# Patient Record
Sex: FEMALE | Race: WHITE | NOT HISPANIC OR LATINO | Employment: OTHER | URBAN - METROPOLITAN AREA
[De-identification: names, ages, dates, MRNs, and addresses within clinical notes are randomized per-mention and may not be internally consistent; named-entity substitution may affect disease eponyms.]

---

## 2017-07-19 ENCOUNTER — HOSPITAL ENCOUNTER (OUTPATIENT)
Dept: ORTHOPEDIC SURGERY | Facility: CLINIC | Age: 62
Discharge: HOME | End: 2017-07-19
Attending: ORTHOPAEDIC SURGERY | Admitting: ORTHOPAEDIC SURGERY

## 2017-08-02 ENCOUNTER — HOSPITAL ENCOUNTER (OUTPATIENT)
Dept: OTHER | Facility: HOSPITAL | Age: 62
Setting detail: RECURRING SERIES
Discharge: HOME | End: 2017-09-05
Attending: ORTHOPAEDIC SURGERY | Admitting: ORTHOPAEDIC SURGERY

## 2017-12-29 ENCOUNTER — HOSPITAL ENCOUNTER (OUTPATIENT)
Dept: ORTHOPEDIC SURGERY | Facility: CLINIC | Age: 62
Discharge: HOME | End: 2017-12-29
Attending: ORTHOPAEDIC SURGERY | Admitting: ORTHOPAEDIC SURGERY

## 2018-02-17 ENCOUNTER — HOSPITAL ENCOUNTER (OUTPATIENT)
Dept: MRI IMAGING | Facility: HOSPITAL | Age: 63
Discharge: HOME | End: 2018-02-17
Attending: ORTHOPAEDIC SURGERY | Admitting: ORTHOPAEDIC SURGERY

## 2018-03-19 ENCOUNTER — HOSPITAL ENCOUNTER (OUTPATIENT)
Dept: PAIN MEDICINE | Facility: HOSPITAL | Age: 63
Discharge: HOME | End: 2018-03-19
Attending: PHYSICAL MEDICINE & REHABILITATION | Admitting: PHYSICAL MEDICINE & REHABILITATION

## 2018-03-19 ENCOUNTER — HOSPITAL ENCOUNTER (OUTPATIENT)
Dept: OTHER | Facility: HOSPITAL | Age: 63
Setting detail: RECURRING SERIES
Discharge: HOME | End: 2018-05-26
Attending: ORTHOPAEDIC SURGERY | Admitting: ORTHOPAEDIC SURGERY

## 2018-04-02 ENCOUNTER — HOSPITAL ENCOUNTER (OUTPATIENT)
Dept: PAIN MEDICINE | Facility: HOSPITAL | Age: 63
Discharge: HOME | End: 2018-04-02
Attending: PHYSICAL MEDICINE & REHABILITATION | Admitting: PHYSICAL MEDICINE & REHABILITATION

## 2018-04-24 ENCOUNTER — HOSPITAL ENCOUNTER (OUTPATIENT)
Dept: PAIN MEDICINE | Facility: HOSPITAL | Age: 63
Discharge: HOME | End: 2018-04-24
Attending: PHYSICAL MEDICINE & REHABILITATION | Admitting: PHYSICAL MEDICINE & REHABILITATION

## 2018-05-08 ENCOUNTER — HOSPITAL ENCOUNTER (OUTPATIENT)
Dept: PAIN MEDICINE | Facility: HOSPITAL | Age: 63
Discharge: HOME | End: 2018-05-08
Attending: PHYSICAL MEDICINE & REHABILITATION | Admitting: PHYSICAL MEDICINE & REHABILITATION

## 2018-08-22 ENCOUNTER — HOSPITAL ENCOUNTER (OUTPATIENT)
Dept: ORTHOPEDIC SURGERY | Facility: CLINIC | Age: 63
Discharge: HOME | End: 2018-08-22
Attending: ORTHOPAEDIC SURGERY | Admitting: ORTHOPAEDIC SURGERY

## 2018-09-05 ENCOUNTER — ON CAMPUS - OUTPATIENT (AMBULATORY)
Dept: URBAN - METROPOLITAN AREA HOSPITAL 2 | Facility: HOSPITAL | Age: 63
End: 2018-09-05

## 2018-09-05 VITALS
HEART RATE: 71 BPM | SYSTOLIC BLOOD PRESSURE: 108 MMHG | SYSTOLIC BLOOD PRESSURE: 92 MMHG | SYSTOLIC BLOOD PRESSURE: 94 MMHG | DIASTOLIC BLOOD PRESSURE: 56 MMHG | OXYGEN SATURATION: 99 % | DIASTOLIC BLOOD PRESSURE: 51 MMHG | OXYGEN SATURATION: 100 % | HEART RATE: 64 BPM | HEART RATE: 68 BPM | DIASTOLIC BLOOD PRESSURE: 65 MMHG | TEMPERATURE: 97.1 F | SYSTOLIC BLOOD PRESSURE: 106 MMHG | HEART RATE: 72 BPM | RESPIRATION RATE: 14 BRPM | HEIGHT: 67 IN | RESPIRATION RATE: 16 BRPM | SYSTOLIC BLOOD PRESSURE: 103 MMHG | SYSTOLIC BLOOD PRESSURE: 100 MMHG | DIASTOLIC BLOOD PRESSURE: 58 MMHG | OXYGEN SATURATION: 96 % | WEIGHT: 182 LBS | RESPIRATION RATE: 17 BRPM | DIASTOLIC BLOOD PRESSURE: 57 MMHG | HEART RATE: 73 BPM | HEART RATE: 69 BPM

## 2018-09-05 DIAGNOSIS — Z48.815 ENCOUNTER FOR SURGICAL AFTERCARE FOLLOWING SURGERY ON THE DI: ICD-10-CM

## 2018-09-05 DIAGNOSIS — K22.2 ESOPHAGEAL OBSTRUCTION: ICD-10-CM

## 2018-09-05 DIAGNOSIS — R13.10 DYSPHAGIA, UNSPECIFIED: ICD-10-CM

## 2018-09-05 PROCEDURE — 43249 ESOPH EGD DILATION <30 MM: CPT | Performed by: INTERNAL MEDICINE

## 2018-10-31 ENCOUNTER — HOSPITAL ENCOUNTER (OUTPATIENT)
Dept: ORTHOPEDIC SURGERY | Facility: CLINIC | Age: 63
Discharge: HOME | End: 2018-10-31
Attending: ORTHOPAEDIC SURGERY | Admitting: ORTHOPAEDIC SURGERY

## 2018-11-30 ENCOUNTER — HOSPITAL ENCOUNTER (OUTPATIENT)
Dept: OTHER | Facility: HOSPITAL | Age: 63
Setting detail: RECURRING SERIES
Discharge: HOME | End: 2018-12-31
Attending: ORTHOPAEDIC SURGERY | Admitting: ORTHOPAEDIC SURGERY

## 2019-01-02 ENCOUNTER — HOSPITAL ENCOUNTER (OUTPATIENT)
Dept: OTHER | Facility: HOSPITAL | Age: 64
Setting detail: RECURRING SERIES
Discharge: HOME | End: 2019-02-05
Attending: ORTHOPAEDIC SURGERY | Admitting: ORTHOPAEDIC SURGERY

## 2019-01-08 ENCOUNTER — HOSPITAL ENCOUNTER (OUTPATIENT)
Dept: ORTHOPEDIC SURGERY | Facility: CLINIC | Age: 64
Discharge: HOME | End: 2019-01-08
Attending: ORTHOPAEDIC SURGERY | Admitting: ORTHOPAEDIC SURGERY

## 2019-01-14 ENCOUNTER — HOSPITAL ENCOUNTER (OUTPATIENT)
Dept: OTHER | Facility: HOSPITAL | Age: 64
Discharge: HOME | End: 2019-01-14
Attending: ORTHOPAEDIC SURGERY | Admitting: ORTHOPAEDIC SURGERY

## 2019-01-18 ENCOUNTER — OFFICE (AMBULATORY)
Dept: URBAN - METROPOLITAN AREA CLINIC 64 | Facility: CLINIC | Age: 64
End: 2019-01-18
Payer: COMMERCIAL

## 2019-01-18 VITALS
DIASTOLIC BLOOD PRESSURE: 74 MMHG | SYSTOLIC BLOOD PRESSURE: 111 MMHG | HEART RATE: 76 BPM | HEIGHT: 67 IN | WEIGHT: 184 LBS

## 2019-01-18 DIAGNOSIS — F45.8 OTHER SOMATOFORM DISORDERS: ICD-10-CM

## 2019-01-18 DIAGNOSIS — R13.10 DYSPHAGIA, UNSPECIFIED: ICD-10-CM

## 2019-01-18 DIAGNOSIS — Z98.84 BARIATRIC SURGERY STATUS: ICD-10-CM

## 2019-01-18 DIAGNOSIS — I82.409 ACUTE EMBOLISM AND THROMBOSIS OF UNSPECIFIED DEEP VEINS OF U: ICD-10-CM

## 2019-01-18 PROCEDURE — 99213 OFFICE O/P EST LOW 20 MIN: CPT | Performed by: NURSE PRACTITIONER

## 2019-01-18 RX ORDER — SUCRALFATE 1 G/10ML
800 SUSPENSION ORAL
Qty: 1600 | Refills: 0 | Status: COMPLETED
Start: 2019-01-18 | End: 2020-02-27

## 2019-01-18 RX ORDER — PANTOPRAZOLE SODIUM 40 MG/1
40 TABLET, DELAYED RELEASE ORAL
Qty: 90 | Refills: 4 | Status: ACTIVE

## 2019-02-21 ENCOUNTER — OFFICE (AMBULATORY)
Dept: URBAN - METROPOLITAN AREA CLINIC 64 | Facility: CLINIC | Age: 64
End: 2019-02-21
Payer: COMMERCIAL

## 2019-02-21 VITALS
WEIGHT: 182 LBS | HEART RATE: 93 BPM | HEIGHT: 67 IN | SYSTOLIC BLOOD PRESSURE: 118 MMHG | DIASTOLIC BLOOD PRESSURE: 71 MMHG

## 2019-02-21 DIAGNOSIS — R13.10 DYSPHAGIA, UNSPECIFIED: ICD-10-CM

## 2019-02-21 PROCEDURE — 99214 OFFICE O/P EST MOD 30 MIN: CPT | Performed by: INTERNAL MEDICINE

## 2019-03-29 ENCOUNTER — ON CAMPUS - OUTPATIENT (AMBULATORY)
Dept: URBAN - METROPOLITAN AREA HOSPITAL 2 | Facility: HOSPITAL | Age: 64
End: 2019-03-29
Payer: COMMERCIAL

## 2019-03-29 VITALS
HEART RATE: 74 BPM | OXYGEN SATURATION: 100 % | TEMPERATURE: 97.6 F | HEART RATE: 76 BPM | HEART RATE: 72 BPM | OXYGEN SATURATION: 96 % | OXYGEN SATURATION: 99 % | DIASTOLIC BLOOD PRESSURE: 73 MMHG | OXYGEN SATURATION: 97 % | HEIGHT: 67 IN | SYSTOLIC BLOOD PRESSURE: 120 MMHG | WEIGHT: 184 LBS | SYSTOLIC BLOOD PRESSURE: 100 MMHG | SYSTOLIC BLOOD PRESSURE: 106 MMHG | DIASTOLIC BLOOD PRESSURE: 59 MMHG | SYSTOLIC BLOOD PRESSURE: 99 MMHG | SYSTOLIC BLOOD PRESSURE: 103 MMHG | RESPIRATION RATE: 16 BRPM | HEART RATE: 68 BPM | DIASTOLIC BLOOD PRESSURE: 58 MMHG | RESPIRATION RATE: 18 BRPM | DIASTOLIC BLOOD PRESSURE: 60 MMHG | HEART RATE: 77 BPM | DIASTOLIC BLOOD PRESSURE: 65 MMHG | HEART RATE: 69 BPM

## 2019-03-29 DIAGNOSIS — Z48.815 ENCOUNTER FOR SURGICAL AFTERCARE FOLLOWING SURGERY ON THE DI: ICD-10-CM

## 2019-03-29 DIAGNOSIS — K22.2 ESOPHAGEAL OBSTRUCTION: ICD-10-CM

## 2019-03-29 DIAGNOSIS — K44.9 DIAPHRAGMATIC HERNIA WITHOUT OBSTRUCTION OR GANGRENE: ICD-10-CM

## 2019-03-29 DIAGNOSIS — R13.10 DYSPHAGIA, UNSPECIFIED: ICD-10-CM

## 2019-03-29 PROCEDURE — 43235 EGD DIAGNOSTIC BRUSH WASH: CPT | Performed by: INTERNAL MEDICINE

## 2019-03-29 PROCEDURE — 43450 DILATE ESOPHAGUS 1/MULT PASS: CPT | Performed by: INTERNAL MEDICINE

## 2019-03-29 RX ORDER — POLYETHYLENE GLYCOL 3350 17 G/17G
POWDER, FOR SOLUTION ORAL
Qty: 3 | Refills: 0 | Status: COMPLETED
Start: 2019-03-29 | End: 2020-02-27

## 2019-04-30 ENCOUNTER — HOSPITAL ENCOUNTER (OUTPATIENT)
Dept: ORTHOPEDIC SURGERY | Facility: CLINIC | Age: 64
Discharge: HOME | End: 2019-04-30
Attending: ORTHOPAEDIC SURGERY | Admitting: ORTHOPAEDIC SURGERY

## 2019-05-22 ENCOUNTER — CONVERSION ENCOUNTER (OUTPATIENT)
Dept: OTHER | Facility: HOSPITAL | Age: 64
End: 2019-05-22

## 2019-05-28 ENCOUNTER — CONVERSION ENCOUNTER (OUTPATIENT)
Dept: ORTHOPEDIC SURGERY | Facility: CLINIC | Age: 64
End: 2019-05-28

## 2019-06-04 VITALS
SYSTOLIC BLOOD PRESSURE: 106 MMHG | DIASTOLIC BLOOD PRESSURE: 67 MMHG | HEART RATE: 93 BPM | HEIGHT: 67 IN | BODY MASS INDEX: 28.41 KG/M2 | WEIGHT: 181 LBS

## 2019-06-04 VITALS — HEIGHT: 67 IN

## 2019-06-06 NOTE — PROGRESS NOTES
Visit Type:  Follow-up Visit  Referring Provider:  Adan Madsen MD  Primary Care Provider:  [facility]    Chief Complaint:  f/u EMG BUE and neck pain.    History of Present Illness:         This is a 63 years old female who presents with f/u EMG BUE, neck pain.  The intensity is described as 6.           The patient presents with central neck pain, bilateral shoulder pain and upper back pain.  The shoulder pain is also associated with numbness and tingling.  The patient describes the pain as sharp, stabbing and burning.  Pain is better with postural   change.  Significant past history includes previous surgery and prior neck problems.  Evaluation to date includes plain films and EMG.        Past Medical History:     Reviewed history from 10/31/2018 and no changes required:        Anemia        Arthritis        Depression        fibromyalgia        HX OF DVT        Hypertension        family hx of heart problems         neck pain        Osteoarthritis        DDD        Scoliosis        Chronic fatigue        Anxiety        Neuropathy        No Drug Allergies?                 Pain management--Dr. Arora TYRA C4-C7        Physical Therapy-- [facility], [place]                                            Past Surgical History:     Reviewed history from 2019 and no changes required:        hysterectomy        abdominal mass removed with ovaries removed as well        veins in both legs stripped        Gastric Bypass:         Total Hip Arthroplasty-left        Esophageal stretching 2018, 2019        ACDF C3-C7 / Daniel C5 2018  Dr. Meeks                Vital Signs:    Patient Profile:    63 Years Old Female  Height:     67 inches (170.18 cm)  Weight:     181 pounds  BMI:        28.35     Pulse rate: 93 / minute  BP Sittin / 67  (left arm)    Cuff size:  large          Vitals Entered By: Zully Sarkar CMA (May 28, 2019 12:58 PM)    Family History Summary:      Reviewed history Last on 2019 and no  changes required:05/28/2019  Brother - Has Family History of Heart Disease - Entered On: 11/2/2016  Father - Has Family History of Other Cancer - Entered On: 11/2/2016  Mother - Has Family History of Heart Disease - Entered On: 11/2/2016      Social History:     Reviewed history from 08/22/2018 and no changes required:        Patient has never smoked.        Passive Smoke: N        Alcohol Use: Y        Drug Use: N        HIV/High Risk: N        Regular Exercise: N        Hx Domestic Abuse: N        Denominational Affecting Care: N              Active Medications:  FLONASE ALLERGY RELIEF 50 MCG/ACT NASAL SUSPENSION (FLUTICASONE PROPIONATE)   ALLEGRA ALLERGY 180 MG ORAL TABLET (FEXOFENADINE HCL)   B-12 COMPLIANCE INJECTION 1000 MCG/ML INJECTION KIT (CYANOCOBALAMIN) as directed  CALCIUM 500 TABLET (CALCIUM-MAGNESIUM-VITAMIN D TABS)   VITAMIN D (ERGOCALCIFEROL) 92373 UNIT ORAL CAPSULE (ERGOCALCIFEROL) 1 po weekly  FOLIC ACID 1 MG ORAL TABLET (FOLIC ACID)   PROTONIX 20 MG ORAL TABLET DELAYED RELEASE (PANTOPRAZOLE SODIUM) 1 tablet daily  SINGULAIR 10 MG ORAL TABLET (MONTELUKAST SODIUM) p. o. qd  CLARITIN 10 MG ORAL CAPSULE (LORATADINE) 1 tablet daily  BACLOFEN 10 MG ORAL TABLET (BACLOFEN) 1 tablet twice daily  HYDROCODONE-ACETAMINOPHEN  MG ORAL TABLET (HYDROCODONE-ACETAMINOPHEN) 1 po q 8 hrs  VALIUM 2 MG ORAL TABLET (DIAZEPAM) 1 po  6 hours prn  LISINOPRIL 20 MG ORAL TABLET (LISINOPRIL)   GABAPENTIN 400 MG ORAL CAPSULE (GABAPENTIN) QID  WELLBUTRIN  MG ORAL TABLET EXTENDED RELEASE 24 HOUR (BUPROPION HCL) qd  PROMETHAZINE HCL 25 MG ORAL TABLET (PROMETHAZINE HCL) Take/use as needed.    Current Allergies:  * NKDA (Critical)      Risk Factors:     Smoked Tobacco Use:  Never smoker  Smokeless Tobacco Use:  Never  Passive smoke exposure:  no  Drug use:  no  HIV high-risk behavior:  no  Caffeine use:  2 drinks per day  Alcohol use:  yes  Exercise:  no  Seatbelt use:  75 %  Sun Exposure:  occasionally    Previous Tobacco  "Use: Signed On - 04/30/2019  Smoked Tobacco Use:  Never smoker  Smokeless Tobacco Use:  Never  Passive smoke exposure:  no  Drug use:  no  HIV high-risk behavior:  no  Caffeine use:  2 drinks per day    Previous Alcohol Use: Signed On - 04/30/2019  Alcohol use:  yes  Exercise:  no  Seatbelt use:  75 %  Sun Exposure:  occasionally      Review of Systems   Neurologic: Denies Seizure.   General: Denies anorexia.   Eyes: Denies irritation.   Ears/Nose/Throat: Denies ear discharge.   Cardiovascular: Denies PND.   Respiratory: Denies chronic cough.   Gastrointestinal: Denies odynophagia.   Musculoskeletal: Complains of back pain, muscle cramps, stiffness.   Skin: Denies rash.   Psychiatric: Denies hallucinations.   Endocrine: Denies polydipsia.   ROS Comments: Minimal axial neck pain bilateral hand discomfort and numbness,, anemia, depression, anxiety the patient is a status post anterior cervical diskectomy and fusion      EXAM:   Movement: Free  Posture good  Occiput Tenderness: None    Neck   Extension Full       Flexion 1\" from chest     Right Lateral bending 30-45  Left Lateral bending 30-45  Right Rotation 45-60  Left Rotation 45-60    Muscle tenderness: None  Hypertonicity None  Vertebral tenderness: None    Thoracic   Muscle tenderness: None  Hypertonicity None  Vertebral tenderness: None    Lumbar:   ROM: slow  Flexes 15 degrees  Flexes 8 inches from floor  Extends slowly  Paraspinous muscle tenderness: bilateral  Hypertonicity none  Vertebral tenderness: None    Sacro-iliac joints:     Tenderness: None    Neuro   Reflexes: R Arm 1+  L arm 1+  R Leg 1+  L Leg 1+    Strength: Arms Normal  Strength: Legs Normal  Straight Leg Raise: Negative        Impression & Recommendations:    Problem # 1:  Carpal tunnel syndrome, bilateral upper limbs (XMT65-F81.03)  Assessment: Deteriorated    Orders:  Ofc Vst, Est Level III (71091)     impression: this patient is here today for follow-up and evaluation of her the EMG the " patient is a status post anterior cervical diskectomy and fusion her EMG is negative for cervical radiculopathy positive for peripheral neuropathy of carpal and cubital   tunnel syndrome.  The patient also complains of axial lower back pain bilateral buttock and leg pain her previous lumbar MRI is demonstrating disc degeneration desiccation of L1-S1 I could not see any surgical solution for her lumbar spine at this time my   recommendation is to continue pain management and a core strengthening exercise and for her peripheral neuropathy I am going to send this patient to see Dr. Grey  for evaluation of carpal and cubital tunnel syndrome I would like to see this patient in my   office in a as needed basis.      Patient Instructions:  1)  Please schedule a follow-up appointment after physical therapy.   2)  Please schedule a follow-up appointment as needed.  3)  Discussed importance of regular exercise and recommended starting or continuing a regular exercise program for good health.  4)  The patient was encouraged to lose weight for better health.    ]      Electronically signed by Maritza Meeks MD on 05/28/2019 at 3:32 PM  ________________________________________________________________________  Clinical Lists Changes    Orders:  Added new Referral order of Ortho Consult ([facility]) - Signed                          Electronically signed by Zully Sarkar CMA on 05/30/2019 at 6:48 AM  ________________________________________________________________________  Clinical Lists Changes    Orders:  Added new Referral order of Pain Management/Injections ([facility]) - Signed                          Electronically signed by Zully Sarkar CMA on 05/30/2019 at 8:30 AM  ________________________________________________________________________  Clinical Lists Changes    Medications:  Added new medication of DICLOFENAC POTASSIUM 50 MG ORAL TABLET (DICLOFENAC POTASSIUM) 1 po bid to tid; Route:  ORAL                          Electronically signed by Zully Sarkar CMA on 05/30/2019 at 8:40 AM  ________________________________________________________________________       Disclaimer: Converted Note message may not contain all data elements that existed in the legacy source system. Please see [employer] for the original note details.

## 2019-07-18 ENCOUNTER — HOSPITAL ENCOUNTER (OUTPATIENT)
Dept: PAIN MEDICINE | Facility: HOSPITAL | Age: 64
Discharge: HOME | End: 2019-07-18
Admitting: PHYSICAL MEDICINE & REHABILITATION

## 2019-07-18 ENCOUNTER — HOSPITAL ENCOUNTER (OUTPATIENT)
Dept: GENERAL RADIOLOGY | Facility: HOSPITAL | Age: 64
Discharge: HOME | End: 2019-07-18

## 2019-07-18 VITALS
SYSTOLIC BLOOD PRESSURE: 150 MMHG | OXYGEN SATURATION: 75 % | BODY MASS INDEX: 27.47 KG/M2 | HEART RATE: 60 BPM | RESPIRATION RATE: 16 BRPM | TEMPERATURE: 98.2 F | HEIGHT: 67 IN | WEIGHT: 175 LBS | DIASTOLIC BLOOD PRESSURE: 73 MMHG

## 2019-07-18 DIAGNOSIS — G89.29 CHRONIC BILATERAL LOW BACK PAIN WITH SCIATICA, SCIATICA LATERALITY UNSPECIFIED: Primary | ICD-10-CM

## 2019-07-18 DIAGNOSIS — R52 PAIN: ICD-10-CM

## 2019-07-18 DIAGNOSIS — M54.40 CHRONIC BILATERAL LOW BACK PAIN WITH SCIATICA, SCIATICA LATERALITY UNSPECIFIED: Primary | ICD-10-CM

## 2019-07-18 DIAGNOSIS — M47.817 OSTEOARTHRITIS OF LUMBOSACRAL SPINE WITHOUT MYELOPATHY: ICD-10-CM

## 2019-07-18 PROBLEM — M25.552 HIP PAIN, LEFT: Status: ACTIVE | Noted: 2017-12-29

## 2019-07-18 PROBLEM — M50.10 CERVICAL DISC DISORDER WITH RADICULOPATHY: Status: ACTIVE | Noted: 2017-07-19

## 2019-07-18 PROBLEM — M54.2 NECK PAIN: Status: ACTIVE | Noted: 2017-07-19

## 2019-07-18 PROBLEM — M54.50 LOW BACK PAIN: Status: ACTIVE | Noted: 2018-04-24

## 2019-07-18 PROBLEM — M70.61 GREATER TROCHANTERIC BURSITIS, RIGHT: Status: ACTIVE | Noted: 2018-10-31

## 2019-07-18 PROBLEM — M54.17 LUMBOSACRAL RADICULOPATHY: Status: ACTIVE | Noted: 2017-07-19

## 2019-07-18 PROCEDURE — 64494 INJ PARAVERT F JNT L/S 2 LEV: CPT | Performed by: PHYSICAL MEDICINE & REHABILITATION

## 2019-07-18 PROCEDURE — 77003 FLUOROGUIDE FOR SPINE INJECT: CPT

## 2019-07-18 PROCEDURE — 64493 INJ PARAVERT F JNT L/S 1 LEV: CPT | Performed by: PHYSICAL MEDICINE & REHABILITATION

## 2019-07-18 RX ORDER — DICLOFENAC POTASSIUM 50 MG/1
TABLET, FILM COATED ORAL
COMMUNITY
Start: 2019-05-30 | End: 2020-09-22

## 2019-07-18 RX ORDER — LISINOPRIL 20 MG/1
20 TABLET ORAL DAILY
COMMUNITY
Start: 2017-12-12 | End: 2022-05-12

## 2019-07-18 RX ORDER — FLUTICASONE PROPIONATE 50 MCG
SPRAY, SUSPENSION (ML) NASAL
COMMUNITY
Start: 2019-04-30

## 2019-07-18 RX ORDER — CYANOCOBALAMIN 1000 UG/ML
1000 INJECTION, SOLUTION INTRAMUSCULAR; SUBCUTANEOUS
COMMUNITY
Start: 2019-04-08 | End: 2019-08-29 | Stop reason: SDUPTHER

## 2019-07-18 RX ORDER — HYDROCODONE BITARTRATE AND ACETAMINOPHEN 10; 325 MG/1; MG/1
1 TABLET ORAL EVERY 6 HOURS PRN
COMMUNITY
Start: 2016-11-02 | End: 2022-03-17

## 2019-07-18 RX ORDER — MONTELUKAST SODIUM 10 MG/1
TABLET ORAL EVERY 24 HOURS
COMMUNITY
Start: 2018-08-22 | End: 2022-05-12

## 2019-07-18 RX ORDER — LORATADINE 10 MG/1
CAPSULE, LIQUID FILLED ORAL
COMMUNITY
Start: 2018-08-22 | End: 2021-01-12

## 2019-07-18 RX ORDER — MECLIZINE HCL 12.5 MG/1
12.5 TABLET ORAL
COMMUNITY

## 2019-07-18 RX ORDER — DIAZEPAM 2 MG/1
2 TABLET ORAL
COMMUNITY
End: 2022-05-12

## 2019-07-18 RX ORDER — BUPIVACAINE HYDROCHLORIDE 5 MG/ML
INJECTION, SOLUTION EPIDURAL; INTRACAUDAL
Status: DISPENSED
Start: 2019-07-18 | End: 2019-07-18

## 2019-07-18 RX ORDER — BACLOFEN 10 MG/1
TABLET ORAL EVERY 12 HOURS
COMMUNITY
Start: 2018-08-22 | End: 2020-07-28 | Stop reason: SDUPTHER

## 2019-07-18 RX ORDER — PROMETHAZINE HYDROCHLORIDE 25 MG/1
25 TABLET ORAL
COMMUNITY
Start: 2017-07-19

## 2019-07-18 RX ORDER — FOLIC ACID 1 MG/1
TABLET ORAL
COMMUNITY
Start: 2019-04-05

## 2019-07-18 RX ORDER — LIDOCAINE HYDROCHLORIDE 10 MG/ML
INJECTION, SOLUTION EPIDURAL; INFILTRATION; INTRACAUDAL; PERINEURAL
Status: DISPENSED
Start: 2019-07-18 | End: 2019-07-18

## 2019-07-18 RX ORDER — FEXOFENADINE HCL 180 MG/1
TABLET ORAL
COMMUNITY
Start: 2019-04-30 | End: 2021-01-12

## 2019-07-18 RX ORDER — BUPROPION HYDROCHLORIDE 300 MG/1
TABLET ORAL
COMMUNITY

## 2019-07-18 RX ORDER — GABAPENTIN 400 MG/1
400 CAPSULE ORAL 4 TIMES DAILY
COMMUNITY
Start: 2016-11-02 | End: 2021-10-28 | Stop reason: DRUGHIGH

## 2019-07-18 RX ORDER — PANTOPRAZOLE SODIUM 20 MG/1
20 TABLET, DELAYED RELEASE ORAL DAILY
COMMUNITY
End: 2021-01-12

## 2019-07-18 NOTE — H&P
Patient Care Team:  Adan Madsen MD as PCP - General  Adan Madsen MD as PCP - Family Medicine    Chief complaint Low back pain    Subjective     Back/neck pain. Began years ago, 10/10 at worst, 5/10 at best, 7/10 today, always present, varies, radiates to BUE and b/l hips, aching burning, stabbing, worse with all movement, interferes with ADLs, work, failed chiropractor, massage, course of PT in July 2017 and March 2018. Saw Dr. Meeks, as above, referred for cervical ESIs, also nonsurgical. MRI C-spine with multilevel DDD, DJD. Seen by Dr. Thompson in 2016. Denies anticoagulation, allergy to iodine or contrast, current infection or ABX. Had cervical TYRA with resolution of pain and numbness into BUE, then LESI x 2 with improvement. Rereferred for b/l L1-3 MBB and RFA by Dr. Meeks.        Review of Systems     Past Medical History:   Diagnosis Date   • Hypertension    • Low back pain      Past Surgical History:   Procedure Laterality Date   • ABDOMINAL MASS RESECTION     • GASTRIC BYPASS     • HIP SURGERY     • HYSTERECTOMY     • NECK SURGERY     • VEIN SURGERY       Family History   Problem Relation Age of Onset   • Heart disease Mother    • Hypertension Mother    • Cancer Father      Social History     Tobacco Use   • Smoking status: Never Smoker   Substance Use Topics   • Alcohol use: No     Frequency: Never   • Drug use: Not on file       (Not in a hospital admission)  Allergies:  Patient has no known allergies.    Objective      Vital Signs  Temp:  [98.2 °F (36.8 °C)] 98.2 °F (36.8 °C)  Heart Rate:  [69] 69  Resp:  [16] 16  BP: (107)/(71) 107/71    Physical Exam    Results Review:   None      Assessment/Plan       * No active hospital problems. *      Assessment & Plan    I discussed the patients findings and my recommendations with patient     Had 1st cervical TYRA, then 2 ILESI.  Received MRI L[facility]spine from [facility], appropriate for LESIs.  Perform 1st b/l L1-3 MBB today per   Majd.  No change to meds today.  RTC for 2nd MBB.      Lumbar Medial Branch Block    PREOPERATIVE DIAGNOSIS: Lumbar spondylosis    POSTOPERATIVE DIAGNOSIS: Lumbar spondylosis    PROCEDURE PERFORMED: Lumbar Medial Branch Block, bilateral L1/2, L2/3    The patient presents with a history of lumbosacral spondylosis [ bilaterally ] at level L1 - L3.  The patient presents today for a lumbar medial branch block. This is the [ first ] procedure. The patient understands the risks and benefits of the procedure and wishes to proceed. The patient was seen in the preoperative area.  Patient's consent was obtained and updated.  Vitals were taken.  Patient was then brought to the procedure suite and placed in a prone position. The appropriate anatomic area was widely prepped with Chloroprep and draped in a sterile fashion.  Under fluoroscopic guidance an AP view with no tilt was obtained. A 22 gauge curved tip spinal needle was passed through skin anesthetized with 1% Lidocaine without epinephrine. The needle tip was guided into the superior medial aspect of the transverse process at L1/2 and L2/3.   At this point 0.5 mL of 0.25% Marcaine was injected. A sterile dressing was placed over the puncture sites. The patient tolerated the procedure with [ no complications ]. They were then brought to the post procedure area where they recovered nicely.    Approximately 25 minutes after procedure the patient reported [ essentially 100 ]% relief from intervention.    Discharge:  The patient will be discharged home in stable condition.   Patient understands to contact the Center with any post procedure questions or concerns.  Discharge instructions given by nursing staff.        Reece Arora MD  07/18/19  10:38 AM    Time: Discharge 15 min

## 2019-07-18 NOTE — PATIENT INSTRUCTIONS
Facet Joint Block, Care After  Refer to this sheet in the next few weeks. These instructions provide you with information about caring for yourself after your procedure. Your health care provider may also give you more specific instructions. Your treatment has been planned according to current medical practices, but problems sometimes occur. Call your health care provider if you have any problems or questions after your procedure.  What can I expect after the procedure?  After the procedure, it is common to have:  · Some tenderness over the injection sites for 2 days after the procedure.  · A temporary increase in blood sugar if you have diabetes.    Follow these instructions at home:  · Keep track of the amount of pain relief you feel and how long it lasts.  · Take over-the-counter and prescription medicines only as told by your health care provider. You may need to limit pain medicine within the first 4-6 hours after the procedure.  · Remove your bandages (dressings) the morning after the procedure.  · For the first 24 hours after the procedure:  ? Do not apply heat near or over the injection sites.  ? Do not take a bath or soak in water, such as in a pool or lake.  ? Do not drive or operate heavy machinery unless approved by your health care provider.  ? Avoid activities that require a lot of energy.  · If the injection site is tender, try applying ice to the area. To do this:  ? Put ice in a plastic bag.  ? Place a towel between your skin and the bag.  ? Leave the ice on for 20 minutes, 2-3 times a day.  · Keep all follow-up visits as told by your health care provider. This is important.  Contact a health care provider if:  · Fluid is coming from an injection site.  · There is significant bleeding or swelling at an injection site.  · You have diabetes and your blood sugar is above 180 mg/dL.  Get help right away if:  · You have a fever.  · You have worsening pain or swelling around an injection site.  · There  are red streaks around an injection site.  · You develop severe pain that is not controlled by your medicines.  · You develop a headache, stiff neck, nausea, or vomiting.  · Your eyes become very sensitive to light.  · You have weakness, paralysis, or tingling in your arms or legs that was not present before the procedure.  · You have difficulty urinating or breathing.  This information is not intended to replace advice given to you by your health care provider. Make sure you discuss any questions you have with your health care provider.  Document Released: 12/04/2013 Document Revised: 05/03/2017 Document Reviewed: 09/12/2016  [employer] Interactive Patient Education © 2019 [employer]

## 2019-08-29 ENCOUNTER — HOSPITAL ENCOUNTER (OUTPATIENT)
Dept: GENERAL RADIOLOGY | Facility: HOSPITAL | Age: 64
Discharge: HOME | End: 2019-08-29

## 2019-08-29 ENCOUNTER — TELEPHONE (OUTPATIENT)
Dept: PAIN MEDICINE | Facility: HOSPITAL | Age: 64
End: 2019-08-29

## 2019-08-29 ENCOUNTER — HOSPITAL ENCOUNTER (OUTPATIENT)
Dept: PAIN MEDICINE | Facility: HOSPITAL | Age: 64
Discharge: HOME | End: 2019-08-29
Admitting: FAMILY MEDICINE

## 2019-08-29 VITALS
SYSTOLIC BLOOD PRESSURE: 119 MMHG | DIASTOLIC BLOOD PRESSURE: 62 MMHG | WEIGHT: 185 LBS | OXYGEN SATURATION: 100 % | HEART RATE: 56 BPM | HEIGHT: 66 IN | BODY MASS INDEX: 29.73 KG/M2 | RESPIRATION RATE: 16 BRPM | TEMPERATURE: 97.8 F

## 2019-08-29 DIAGNOSIS — R52 PAIN: ICD-10-CM

## 2019-08-29 DIAGNOSIS — M47.817 OSTEOARTHRITIS OF LUMBOSACRAL SPINE WITHOUT MYELOPATHY: ICD-10-CM

## 2019-08-29 DIAGNOSIS — G89.29 CHRONIC BILATERAL LOW BACK PAIN WITH SCIATICA, SCIATICA LATERALITY UNSPECIFIED: Primary | ICD-10-CM

## 2019-08-29 DIAGNOSIS — M54.40 CHRONIC BILATERAL LOW BACK PAIN WITH SCIATICA, SCIATICA LATERALITY UNSPECIFIED: Primary | ICD-10-CM

## 2019-08-29 PROCEDURE — 64493 INJ PARAVERT F JNT L/S 1 LEV: CPT | Performed by: PHYSICAL MEDICINE & REHABILITATION

## 2019-08-29 PROCEDURE — 64494 INJ PARAVERT F JNT L/S 2 LEV: CPT | Performed by: PHYSICAL MEDICINE & REHABILITATION

## 2019-08-29 PROCEDURE — 77003 FLUOROGUIDE FOR SPINE INJECT: CPT

## 2019-08-29 RX ORDER — BUPIVACAINE HYDROCHLORIDE 5 MG/ML
INJECTION, SOLUTION EPIDURAL; INTRACAUDAL
Status: DISPENSED
Start: 2019-08-29 | End: 2019-08-30

## 2019-08-29 RX ORDER — LIDOCAINE HYDROCHLORIDE 10 MG/ML
INJECTION, SOLUTION EPIDURAL; INFILTRATION; INTRACAUDAL; PERINEURAL
Status: DISPENSED
Start: 2019-08-29 | End: 2019-08-30

## 2019-08-29 NOTE — PATIENT INSTRUCTIONS
Facet Joint Block, Care After  Refer to this sheet in the next few weeks. These instructions provide you with information about caring for yourself after your procedure. Your health care provider may also give you more specific instructions. Your treatment has been planned according to current medical practices, but problems sometimes occur. Call your health care provider if you have any problems or questions after your procedure.  What can I expect after the procedure?  After the procedure, it is common to have:  · Some tenderness over the injection sites for 2 days after the procedure.  · A temporary increase in blood sugar if you have diabetes.  Follow these instructions at home:  · Keep track of the amount of pain relief you feel and how long it lasts.  · Take over-the-counter and prescription medicines only as told by your health care provider. You may need to limit pain medicine within the first 4-6 hours after the procedure.  · Remove your bandages (dressings) the morning after the procedure.  · For the first 24 hours after the procedure:  ? Do not apply heat near or over the injection sites.  ? Do not take a bath or soak in water, such as in a pool or lake.  ? Do not drive or operate heavy machinery unless approved by your health care provider.  ? Avoid activities that require a lot of energy.  · If the injection site is tender, try applying ice to the area. To do this:  ? Put ice in a plastic bag.  ? Place a towel between your skin and the bag.  ? Leave the ice on for 20 minutes, 2-3 times a day.  · Keep all follow-up visits as told by your health care provider. This is important.  Contact a health care provider if:  · Fluid is coming from an injection site.  · There is significant bleeding or swelling at an injection site.  · You have diabetes and your blood sugar is above 180 mg/dL.  Get help right away if:  · You have a fever.  · You have worsening pain or swelling around an injection site.  · There are  red streaks around an injection site.  · You develop severe pain that is not controlled by your medicines.  · You develop a headache, stiff neck, nausea, or vomiting.  · Your eyes become very sensitive to light.  · You have weakness, paralysis, or tingling in your arms or legs that was not present before the procedure.  · You have difficulty urinating or breathing.  This information is not intended to replace advice given to you by your health care provider. Make sure you discuss any questions you have with your health care provider.  Document Released: 12/04/2013 Document Revised: 05/03/2017 Document Reviewed: 09/12/2016  [employer] Interactive Patient Education © 2019 [employer]

## 2019-08-29 NOTE — H&P
Patient Care Team:  Adan Madsen MD as PCP - General  Adan Madsen MD as PCP - Family Medicine    Chief complaint Low back pain    Subjective     Back/neck pain. Began years ago, 10/10 at worst, 5/10 at best, always present, varies, radiates to BUE and b/l hips, aching burning, stabbing, worse with all movement, interferes with ADLs, work, failed chiropractor, massage, course of PT in July 2017 and March 2018. Saw Dr. Meeks, as above, referred for cervical ESIs, also nonsurgical. MRI C-spine with multilevel DDD, DJD. Seen by Dr. Thompson in 2016. Denies anticoagulation, allergy to iodine or contrast, current infection or ABX. Had cervical TYRA with resolution of pain and numbness into BUE, then LESI x 2 with improvement. Rereferred for b/l L1-3 MBB and RFA by Dr. Meeks.        Review of Systems       Past Medical History:   Diagnosis Date   • Arthritis    • Chronic pain disorder    • Clot     history of blood clots   • Cyanocobalamine deficiency (non anemic)    • DDD (degenerative disc disease), cervical    • DDD (degenerative disc disease), lumbar    • Depression    • Fibromyalgia, primary    • Fractures     L5-L1   • Hypertension    • Low back pain    • Osteoarthritis    • Scoliosis    • Spinal stenosis      Past Surgical History:   Procedure Laterality Date   • ABDOMINAL MASS RESECTION     • GASTRIC BYPASS     • HIP SURGERY     • HIP SURGERY      replacement left   • HYSTERECTOMY     • NECK SURGERY     • VEIN SURGERY       Family History   Problem Relation Age of Onset   • Heart disease Mother    • Hypertension Mother    • Cancer Father      Social History     Tobacco Use   • Smoking status: Never Smoker   • Smokeless tobacco: Never Used   Substance Use Topics   • Alcohol use: No     Frequency: Never   • Drug use: Defer       (Not in a hospital admission)  Allergies:  Patient has no known allergies.    Objective      Vital Signs  Temp:  [97.8 °F (36.6 °C)] 97.8 °F (36.6 °C)  Heart Rate:   [61] 61  Resp:  [16] 16  BP: (138)/(79) 138/79    Physical Exam      Results Review:   None      Assessment/Plan       * No active hospital problems. *      ICD-10-CM ICD-9-CM   1. Chronic bilateral low back pain with sciatica, sciatica laterality unspecified M54.40 724.2    G89.29 724.3     338.29   2. Osteoarthritis of lumbosacral spine without myelopathy M47.817 721.3         Assessment & Plan      I discussed the patients findings and my recommendations with patient     Had 1st cervical TYRA, then 2 ILESI.  Received MRI L[facility]spine from [facility], appropriate for LESIs.  Perform 2nd b/l L1-3 MBB today per Dr. Meeks, 100% relief with 1st.  No change to meds today.  RTC for left then right lumbar RFA without sedation.      Lumbar Medial Branch Block    PREOPERATIVE DIAGNOSIS: Lumbar spondylosis    POSTOPERATIVE DIAGNOSIS: Lumbar spondylosis    PROCEDURE PERFORMED: Lumbar Medial Branch Block, bilateral L1/2, L2/3    The patient presents with a history of lumbosacral spondylosis [ bilaterally ] at level L1 - L3.  The patient presents today for a lumbar medial branch block. This is the [ second ] procedure. The patient understands the risks and benefits of the procedure and wishes to proceed. The patient was seen in the preoperative area.  Patient's consent was obtained and updated.  Vitals were taken.  Patient was then brought to the procedure suite and placed in a prone position. The appropriate anatomic area was widely prepped with Chloroprep and draped in a sterile fashion.  Under fluoroscopic guidance an AP view with no tilt was obtained. A 22 gauge curved tip spinal needle was passed through skin anesthetized with 1% Lidocaine without epinephrine. The needle tip was guided into the superior medial aspect of the transverse process at L1/2 and L2/3.   At this point 0.5 mL of 0.25% Marcaine was injected. A sterile dressing was placed over the puncture sites. The patient tolerated the procedure with [ no complications  ]. They were then brought to the post procedure area where they recovered nicely.    Approximately 25 minutes after procedure the patient reported [ again essentially 100 ]% relief from intervention.    Discharge:  The patient will be discharged home in stable condition.   Patient understands to contact the Center with any post procedure questions or concerns.  Discharge instructions given by nursing staff.        Reece Arora MD  08/29/19  11:50 AM    Time: Discharge 15 min

## 2019-08-29 NOTE — ADDENDUM NOTE
Encounter addended by: Pooja Peña RN on: 8/29/2019 1:14 PM   Actions taken: Visit Navigator Flowsheet section accepted, Charge Capture section accepted

## 2019-10-15 ENCOUNTER — HOSPITAL ENCOUNTER (OUTPATIENT)
Dept: PAIN MEDICINE | Facility: HOSPITAL | Age: 64
Discharge: HOME | End: 2019-10-15
Admitting: PHYSICAL MEDICINE & REHABILITATION

## 2019-10-15 VITALS
WEIGHT: 180 LBS | SYSTOLIC BLOOD PRESSURE: 108 MMHG | HEART RATE: 69 BPM | OXYGEN SATURATION: 100 % | RESPIRATION RATE: 16 BRPM | BODY MASS INDEX: 29.99 KG/M2 | TEMPERATURE: 98.1 F | HEIGHT: 65 IN | DIASTOLIC BLOOD PRESSURE: 70 MMHG

## 2019-10-15 DIAGNOSIS — G89.29 CHRONIC BILATERAL LOW BACK PAIN WITH SCIATICA, SCIATICA LATERALITY UNSPECIFIED: Primary | ICD-10-CM

## 2019-10-15 DIAGNOSIS — M54.40 CHRONIC BILATERAL LOW BACK PAIN WITH SCIATICA, SCIATICA LATERALITY UNSPECIFIED: Primary | ICD-10-CM

## 2019-10-15 DIAGNOSIS — M47.817 OSTEOARTHRITIS OF LUMBOSACRAL SPINE WITHOUT MYELOPATHY: ICD-10-CM

## 2019-10-15 PROCEDURE — 64635 DESTROY LUMB/SAC FACET JNT: CPT | Performed by: PHYSICAL MEDICINE & REHABILITATION

## 2019-10-15 PROCEDURE — 25010000002 METHYLPREDNISOLONE PER 40 MG

## 2019-10-15 PROCEDURE — 64636 DESTROY L/S FACET JNT ADDL: CPT | Performed by: PHYSICAL MEDICINE & REHABILITATION

## 2019-10-15 RX ORDER — BACLOFEN 10 MG/1
TABLET ORAL
COMMUNITY
End: 2021-06-18

## 2019-10-15 RX ORDER — LIDOCAINE HYDROCHLORIDE 10 MG/ML
INJECTION, SOLUTION EPIDURAL; INFILTRATION; INTRACAUDAL; PERINEURAL
Status: DISPENSED
Start: 2019-10-15 | End: 2019-10-16

## 2019-10-15 RX ORDER — BUPIVACAINE HYDROCHLORIDE 5 MG/ML
INJECTION, SOLUTION EPIDURAL; INTRACAUDAL
Status: DISPENSED
Start: 2019-10-15 | End: 2019-10-16

## 2019-10-15 RX ORDER — MONTELUKAST SODIUM 10 MG/1
10 TABLET ORAL DAILY
COMMUNITY

## 2019-10-15 RX ORDER — METHYLPREDNISOLONE ACETATE 40 MG/ML
INJECTION, SUSPENSION INTRA-ARTICULAR; INTRALESIONAL; INTRAMUSCULAR; SOFT TISSUE
Status: DISPENSED
Start: 2019-10-15 | End: 2019-10-16

## 2019-10-15 NOTE — H&P
Patient Care Team:  Adan Madsen MD as PCP - General  Adan Madsen MD as PCP - Family Medicine    Chief complaint Low back pain    Subjective     Back/neck pain. Began years ago, 10/10 at worst, 5/10 at best, always present, varies, radiates to BUE and b/l hips, aching burning, stabbing, worse with all movement, interferes with ADLs, work, failed chiropractor, massage, course of PT in July 2017 and March 2018. Saw Dr. Meeks, as above, referred for cervical ESIs, also nonsurgical. MRI C-spine with multilevel DDD, DJD. Seen by Dr. Thompson in 2016. Denies anticoagulation, allergy to iodine or contrast, current infection or ABX. Had cervical TYRA with resolution of pain and numbness into BUE, then LESI x 2 with improvement. Rereferred for b/l L1-3 MBB and RFA by Dr. Meeks.         Review of Systems       Past Medical History:   Diagnosis Date   • Arthritis    • Chronic pain disorder    • Clot     history of blood clots   • Cyanocobalamine deficiency (non anemic)    • DDD (degenerative disc disease), cervical    • DDD (degenerative disc disease), lumbar    • Depression    • Fibromyalgia, primary    • Fractures     L5-L1   • Hypertension    • Low back pain    • Osteoarthritis    • Scoliosis    • Spinal stenosis      Past Surgical History:   Procedure Laterality Date   • ABDOMINAL MASS RESECTION     • GASTRIC BYPASS     • HIP SURGERY     • HIP SURGERY      replacement left   • HYSTERECTOMY     • NECK SURGERY     • VEIN SURGERY       Family History   Problem Relation Age of Onset   • Heart disease Mother    • Hypertension Mother    • Cancer Father      Social History     Tobacco Use   • Smoking status: Never Smoker   • Smokeless tobacco: Never Used   Substance Use Topics   • Alcohol use: No     Frequency: Never   • Drug use: Defer       (Not in a hospital admission)  Allergies:  Patient has no known allergies.    Objective      Vital Signs  Temp:  [98.1 °F (36.7 °C)] 98.1 °F (36.7 °C)  Heart Rate:   [74] 74  Resp:  [16] 16  BP: (94)/(60) 94/60    Physical Exam      Results Review:   None      Assessment/Plan       * No active hospital problems. *      ICD-10-CM ICD-9-CM   1. Chronic bilateral low back pain with sciatica, sciatica laterality unspecified M54.40 724.2    G89.29 724.3     338.29   2. Osteoarthritis of lumbosacral spine without myelopathy M47.817 721.3         Assessment & Plan      I discussed the patients findings and my recommendations with patient     Had 1st cervical TYRA, then 2 ILESI.  Received MRI L[facility]spine from [facility], appropriate for LESIs.  Performed 2nd b/l L1-3 MBB per Dr. Meeks, 100% relief with both.  Perform left L1-3 RFA today.  No change to meds today.  RTC for right lumbar RFA without sedation.      Lumbar Radiofrequency Ablation    Pre-Procedure Diagnosis: Lumbar Facet Syndrome    Post-Procedure Diagnosis: Lumbar Facet Syndrome    Description of Procedure: Left L1/2, L2/3 medial branch radiofrequency ablation    Indications for Procedure: Lumbar Facet Disease    Anesthetic/Sedation: none         Description of Findings (include specimens removed, if any): Patient positioned   prone on fluoro table. After prepped w/ chloroprep and draped, a 20 G 100 mm   curved RF needle with 10 mm active tip directed under fluoro guidance to the left L1, L2, and L3 medial branches respectively. Sensory and motor testing conducted at each level individually with sensation confined to the lower back. Each needle then anesthetized with Lidocaine 1% 1ml, followed by a 180 sec pause. Lateral images before and after injection of Lidocaine compared to ensure no needle migration. All 3 levels were lesioned at same time.    Level     Impedance    Sensory(50 Hz)        Motor (2Hz)   Lesion    L1      166          0.8v                           2.0v         80 degrees C/90 sec  L2            156                  0.4v                           2.0v         80 degrees C/90 sec  L3     170                   0.1v                           2.0v         80 degrees C/90 sec    Injected 1 cc 1% Lidocaine at each level prior to lesioning followed by 180 second pause    Patient tolerated the procedure well. Vital signs remained stable throughout   the procedure. Sensorimotor exam of the leftleg unchanged following the procedure.      Condition: Stable. Endorses good pain relief.        Reece Arora MD  10/15/19  2:46 PM    Time: Discharge 15 min

## 2019-11-18 ENCOUNTER — HOSPITAL ENCOUNTER (OUTPATIENT)
Dept: PAIN MEDICINE | Facility: HOSPITAL | Age: 64
Discharge: HOME | End: 2019-11-18

## 2019-11-18 ENCOUNTER — HOSPITAL ENCOUNTER (OUTPATIENT)
Dept: PAIN MEDICINE | Facility: HOSPITAL | Age: 64
Discharge: HOME | End: 2019-11-18
Admitting: PHYSICAL MEDICINE & REHABILITATION

## 2019-11-18 VITALS
DIASTOLIC BLOOD PRESSURE: 79 MMHG | SYSTOLIC BLOOD PRESSURE: 129 MMHG | BODY MASS INDEX: 29.99 KG/M2 | WEIGHT: 180 LBS | OXYGEN SATURATION: 99 % | TEMPERATURE: 98.1 F | HEIGHT: 65 IN | HEART RATE: 73 BPM | RESPIRATION RATE: 16 BRPM

## 2019-11-18 DIAGNOSIS — M54.40 CHRONIC BILATERAL LOW BACK PAIN WITH SCIATICA, SCIATICA LATERALITY UNSPECIFIED: Primary | ICD-10-CM

## 2019-11-18 DIAGNOSIS — R52 PAIN: ICD-10-CM

## 2019-11-18 DIAGNOSIS — G89.29 CHRONIC BILATERAL LOW BACK PAIN WITH SCIATICA, SCIATICA LATERALITY UNSPECIFIED: Primary | ICD-10-CM

## 2019-11-18 DIAGNOSIS — M47.817 OSTEOARTHRITIS OF LUMBOSACRAL SPINE WITHOUT MYELOPATHY: ICD-10-CM

## 2019-11-18 PROCEDURE — 64636 DESTROY L/S FACET JNT ADDL: CPT | Performed by: PHYSICAL MEDICINE & REHABILITATION

## 2019-11-18 PROCEDURE — 77003 FLUOROGUIDE FOR SPINE INJECT: CPT

## 2019-11-18 PROCEDURE — 64635 DESTROY LUMB/SAC FACET JNT: CPT | Performed by: PHYSICAL MEDICINE & REHABILITATION

## 2019-11-18 PROCEDURE — 25010000002 METHYLPREDNISOLONE PER 40 MG

## 2019-11-18 RX ORDER — LIDOCAINE HYDROCHLORIDE 10 MG/ML
INJECTION, SOLUTION EPIDURAL; INFILTRATION; INTRACAUDAL; PERINEURAL
Status: DISCONTINUED
Start: 2019-11-18 | End: 2019-11-19 | Stop reason: HOSPADM

## 2019-11-18 RX ORDER — BUPIVACAINE HYDROCHLORIDE 5 MG/ML
INJECTION, SOLUTION EPIDURAL; INTRACAUDAL
Status: DISCONTINUED
Start: 2019-11-18 | End: 2019-11-19 | Stop reason: HOSPADM

## 2019-11-18 RX ORDER — METHYLPREDNISOLONE ACETATE 40 MG/ML
INJECTION, SUSPENSION INTRA-ARTICULAR; INTRALESIONAL; INTRAMUSCULAR; SOFT TISSUE
Status: DISCONTINUED
Start: 2019-11-18 | End: 2019-11-19 | Stop reason: HOSPADM

## 2019-11-18 NOTE — H&P
Patient Care Team:  Adan Madsen MD as PCP - General  Adan Madsen MD as PCP - Family Medicine    Chief complaint Low back pain    Subjective     Back/neck pain. Began years ago, 10/10 at worst, 5/10 at best, always present, varies, radiates to BUE and b/l hips, aching burning, stabbing, worse with all movement, interferes with ADLs, work, failed chiropractor, massage, course of PT in July 2017 and March 2018. Saw Dr. Meeks, as above, referred for cervical ESIs, also nonsurgical. MRI C-spine with multilevel DDD, DJD. Seen by Dr. Thompson in 2016. Denies anticoagulation, allergy to iodine or contrast, current infection or ABX. Had cervical TYRA with resolution of pain and numbness into BUE, then LESI x 2 with improvement. Rereferred for b/l L1-3 MBB and RFA by Dr. Meeks, had left RFA, here for right.         Review of Systems       Past Medical History:   Diagnosis Date   • Arthritis    • Chronic pain disorder    • Clot     history of blood clots   • Cyanocobalamine deficiency (non anemic)    • DDD (degenerative disc disease), cervical    • DDD (degenerative disc disease), lumbar    • Depression    • Fibromyalgia, primary    • Fractures     L5-L1   • Hypertension    • Low back pain    • Osteoarthritis    • Scoliosis    • Spinal stenosis      Past Surgical History:   Procedure Laterality Date   • ABDOMINAL MASS RESECTION     • GASTRIC BYPASS     • HIP SURGERY     • HIP SURGERY      replacement left   • HYSTERECTOMY     • NECK SURGERY     • VEIN SURGERY       Family History   Problem Relation Age of Onset   • Heart disease Mother    • Hypertension Mother    • Cancer Father      Social History     Tobacco Use   • Smoking status: Never Smoker   • Smokeless tobacco: Never Used   Substance Use Topics   • Alcohol use: No     Frequency: Never   • Drug use: Defer       (Not in a hospital admission)  Allergies:  Patient has no known allergies.    Objective      Vital Signs  Temp:  [98.1 °F (36.7 °C)]  98.1 °F (36.7 °C)  Heart Rate:  [93] 93  Resp:  [16] 16  BP: (123)/(78) 123/78    Physical Exam      Results Review:   None      Assessment/Plan       * No active hospital problems. *      ICD-10-CM ICD-9-CM   1. Chronic bilateral low back pain with sciatica, sciatica laterality unspecified M54.40 724.2    G89.29 724.3     338.29   2. Osteoarthritis of lumbosacral spine without myelopathy M47.817 721.3         Assessment & Plan      I discussed the patients findings and my recommendations with patient     Had 1st cervical TYRA, then 2 ILESI.  Received MRI L[facility]spine from [facility], appropriate for LESIs.  Performed 2nd b/l L1-3 MBB per Dr. Meeks, 100% relief with both.  Performed left L1-3 RFA, perform right today.  No change to meds today.  RTC prn to repeat procedures.      Lumbar Radiofrequency Ablation    Pre-Procedure Diagnosis: Lumbar Facet Syndrome    Post-Procedure Diagnosis: Lumbar Facet Syndrome    Description of Procedure: Right L1/2, L2/3 medial branch radiofrequency ablation    Indications for Procedure: Lumbar Facet Disease    Anesthetic/Sedation: none         Description of Findings (include specimens removed, if any): Patient positioned   prone on fluoro table. After prepped w/ chloroprep and draped, a 20 G 100 mm   curved RF needle with 10 mm active tip directed under fluoro guidance to the right L1, L2, and L3 medial branches respectively. Sensory and motor testing conducted at each level individually with sensation confined to the lower back. Each needle then anesthetized with Lidocaine 1% 1ml, followed by a 180 sec pause. Lateral images before and after injection of Lidocaine compared to ensure no needle migration. All 3 levels were lesioned at same time.    Level     Impedance    Sensory(50 Hz)        Motor (2Hz)   Lesion    L1      177          1.0v                           2.0v         80 degrees C/90 sec  L2            195                  0.5v                           2.0v         80 degrees  C/90 sec  L3     180                  0.5v                           2.0v         80 degrees C/90 sec    Injected 1 cc 1% Lidocaine at each level prior to lesioning followed by 180 second pause    Patient tolerated the procedure well. Vital signs remained stable throughout   the procedure. Sensorimotor exam of the right leg unchanged following the procedure.      Condition: Stable. Endorses good pain relief.        Reece Arora MD  11/18/19  2:05 PM    Time: Discharge 15 min

## 2020-02-27 ENCOUNTER — OFFICE (AMBULATORY)
Dept: URBAN - METROPOLITAN AREA CLINIC 64 | Facility: CLINIC | Age: 65
End: 2020-02-27

## 2020-02-27 VITALS
HEIGHT: 67 IN | HEART RATE: 71 BPM | WEIGHT: 181 LBS | DIASTOLIC BLOOD PRESSURE: 68 MMHG | SYSTOLIC BLOOD PRESSURE: 122 MMHG

## 2020-02-27 DIAGNOSIS — R13.10 DYSPHAGIA, UNSPECIFIED: ICD-10-CM

## 2020-02-27 DIAGNOSIS — M50.90 CERVICAL DISC DISORDER, UNSPECIFIED, UNSPECIFIED CERVICAL RE: ICD-10-CM

## 2020-02-27 DIAGNOSIS — M54.2 CERVICALGIA: ICD-10-CM

## 2020-02-27 PROCEDURE — 99214 OFFICE O/P EST MOD 30 MIN: CPT | Performed by: NURSE PRACTITIONER

## 2020-03-19 ENCOUNTER — HOSPITAL ENCOUNTER (OUTPATIENT)
Dept: PAIN MEDICINE | Facility: HOSPITAL | Age: 65
Discharge: HOME | End: 2020-03-19

## 2020-03-19 PROCEDURE — 77003 FLUOROGUIDE FOR SPINE INJECT: CPT

## 2020-05-13 ENCOUNTER — ON CAMPUS - OUTPATIENT (AMBULATORY)
Dept: URBAN - METROPOLITAN AREA HOSPITAL 2 | Facility: HOSPITAL | Age: 65
End: 2020-05-13

## 2020-05-13 VITALS
DIASTOLIC BLOOD PRESSURE: 56 MMHG | HEIGHT: 67 IN | DIASTOLIC BLOOD PRESSURE: 60 MMHG | RESPIRATION RATE: 18 BRPM | SYSTOLIC BLOOD PRESSURE: 95 MMHG | WEIGHT: 198 LBS | OXYGEN SATURATION: 99 % | DIASTOLIC BLOOD PRESSURE: 64 MMHG | SYSTOLIC BLOOD PRESSURE: 93 MMHG | OXYGEN SATURATION: 100 % | HEART RATE: 81 BPM | DIASTOLIC BLOOD PRESSURE: 63 MMHG | HEART RATE: 84 BPM | SYSTOLIC BLOOD PRESSURE: 109 MMHG | TEMPERATURE: 98.5 F | HEART RATE: 89 BPM | HEART RATE: 72 BPM | OXYGEN SATURATION: 98 % | HEART RATE: 90 BPM | SYSTOLIC BLOOD PRESSURE: 102 MMHG | HEART RATE: 96 BPM | RESPIRATION RATE: 16 BRPM | SYSTOLIC BLOOD PRESSURE: 115 MMHG | SYSTOLIC BLOOD PRESSURE: 107 MMHG | OXYGEN SATURATION: 93 % | DIASTOLIC BLOOD PRESSURE: 53 MMHG | OXYGEN SATURATION: 97 %

## 2020-05-13 DIAGNOSIS — Z48.815 ENCOUNTER FOR SURGICAL AFTERCARE FOLLOWING SURGERY ON THE DI: ICD-10-CM

## 2020-05-13 DIAGNOSIS — K22.2 ESOPHAGEAL OBSTRUCTION: ICD-10-CM

## 2020-05-13 DIAGNOSIS — R13.10 DYSPHAGIA, UNSPECIFIED: ICD-10-CM

## 2020-05-13 DIAGNOSIS — K44.9 DIAPHRAGMATIC HERNIA WITHOUT OBSTRUCTION OR GANGRENE: ICD-10-CM

## 2020-05-13 PROCEDURE — 43235 EGD DIAGNOSTIC BRUSH WASH: CPT | Performed by: INTERNAL MEDICINE

## 2020-05-13 PROCEDURE — 43450 DILATE ESOPHAGUS 1/MULT PASS: CPT | Performed by: INTERNAL MEDICINE

## 2020-07-21 ENCOUNTER — APPOINTMENT (OUTPATIENT)
Dept: PAIN MEDICINE | Facility: CLINIC | Age: 65
End: 2020-07-21

## 2020-07-28 ENCOUNTER — OFFICE VISIT (OUTPATIENT)
Dept: PAIN MEDICINE | Facility: CLINIC | Age: 65
End: 2020-07-28

## 2020-07-28 VITALS — WEIGHT: 185 LBS | HEIGHT: 67 IN | BODY MASS INDEX: 29.03 KG/M2

## 2020-07-28 DIAGNOSIS — M79.7 FIBROMYOSITIS: ICD-10-CM

## 2020-07-28 DIAGNOSIS — M47.817 OSTEOARTHRITIS OF LUMBOSACRAL SPINE WITHOUT MYELOPATHY: ICD-10-CM

## 2020-07-28 DIAGNOSIS — M54.16 LUMBAR RADICULITIS: ICD-10-CM

## 2020-07-28 DIAGNOSIS — M25.552 HIP PAIN, LEFT: ICD-10-CM

## 2020-07-28 DIAGNOSIS — M70.61 GREATER TROCHANTERIC BURSITIS, RIGHT: ICD-10-CM

## 2020-07-28 DIAGNOSIS — M54.40 CHRONIC BILATERAL LOW BACK PAIN WITH SCIATICA, SCIATICA LATERALITY UNSPECIFIED: Primary | ICD-10-CM

## 2020-07-28 DIAGNOSIS — M51.36 DEGENERATION OF INTERVERTEBRAL DISC OF LUMBAR REGION: ICD-10-CM

## 2020-07-28 DIAGNOSIS — M50.10 CERVICAL DISC DISORDER WITH RADICULOPATHY: ICD-10-CM

## 2020-07-28 DIAGNOSIS — Z79.899 OTHER LONG TERM (CURRENT) DRUG THERAPY: ICD-10-CM

## 2020-07-28 DIAGNOSIS — M54.2 NECK PAIN: ICD-10-CM

## 2020-07-28 DIAGNOSIS — M54.17 LUMBOSACRAL RADICULOPATHY: ICD-10-CM

## 2020-07-28 DIAGNOSIS — G89.29 CHRONIC BILATERAL LOW BACK PAIN WITH SCIATICA, SCIATICA LATERALITY UNSPECIFIED: Primary | ICD-10-CM

## 2020-07-28 PROCEDURE — 99212 OFFICE O/P EST SF 10 MIN: CPT | Performed by: PHYSICAL MEDICINE & REHABILITATION

## 2020-07-28 RX ORDER — ERGOCALCIFEROL 1.25 MG/1
50000 CAPSULE ORAL
COMMUNITY
Start: 2020-03-04 | End: 2020-08-31

## 2020-07-28 RX ORDER — BACLOFEN 10 MG/1
10 TABLET ORAL 3 TIMES DAILY
Qty: 90 TABLET | Refills: 0 | Status: SHIPPED | OUTPATIENT
Start: 2020-07-28 | End: 2020-09-15 | Stop reason: SDUPTHER

## 2020-07-28 NOTE — PROGRESS NOTES
Subjective   Tahira Ramirez is a 64 y.o. female.     Back/neck pain. Began years ago, 10/10 at worst, 5/10 at best, always present, varies, radiates to BUE and b/l hips, aching burning, stabbing, worse with all movement, interferes with ADLs, work, failed chiropractor, massage, course of PT in July 2017 and March 2018. Saw Dr. Meeks, as above, referred for cervical ESIs, also nonsurgical. MRI C-spine with multilevel DDD, DJD. Seen by Dr. Thompson in 2016. Denies anticoagulation, allergy to iodine or contrast, current infection or ABX. Had cervical TYRA with resolution of pain and numbness into BUE, then LESI x 2 with improvement. Rereferred for b/l L1-3 MBB and RFA by Dr. Meeks, had left then right RFA with tremendous relief. New severe muscle spasms in back, failed Baclofen 10mg BID.       The following portions of the patient's history were reviewed and updated as appropriate: allergies, current medications, past family history, past medical history, past social history, past surgical history and problem list.    Review of Systems   Constitutional: Negative for chills, fatigue and fever.   HENT: Negative for hearing loss and trouble swallowing.    Eyes: Negative for visual disturbance.   Respiratory: Negative for shortness of breath.    Cardiovascular: Negative for chest pain.   Gastrointestinal: Negative for abdominal pain, constipation, diarrhea, nausea and vomiting.   Genitourinary: Negative for urinary incontinence.   Musculoskeletal: Positive for back pain and myalgias. Negative for arthralgias, joint swelling and neck pain.   Neurological: Negative for dizziness, weakness, numbness and headache.       Objective   Physical Exam   Constitutional: She is oriented to person, place, and time.   Neurological: She is alert and oriented to person, place, and time.   Psychiatric: She has a normal mood and affect. Her behavior is normal. Thought content normal.         Assessment/Plan   Tahira was seen today for back  pain.    Diagnoses and all orders for this visit:    Chronic bilateral low back pain with sciatica, sciatica laterality unspecified    Lumbar radiculitis    Lumbosacral radiculopathy    Neck pain    Hip pain, left    Cervical disc disorder with radiculopathy    Degeneration of intervertebral disc of lumbar region    Fibromyositis    Greater trochanteric bursitis, right    Osteoarthritis of lumbosacral spine without myelopathy    Other long term (current) drug therapy        Had 1st cervical TYRA, then 2 ILESI.  Received MRI L-spine from [facility], appropriate for LESIs.  Performed 2 b/l L1-3 MBB per Dr. Meeks, 100% relief with both.  Performed left then right L1-3 RFA. Tremendous relief, still helping.  Increase to Baclofen 10mg TID, may need to increase with PCP next week. Could no longer get Robaxin.  RTC prn to repeat procedures.

## 2020-09-09 RX ORDER — BACLOFEN 10 MG/1
TABLET ORAL
Qty: 90 TABLET | Refills: 0 | OUTPATIENT
Start: 2020-09-09

## 2020-09-15 RX ORDER — BACLOFEN 10 MG/1
10 TABLET ORAL 3 TIMES DAILY
Qty: 90 TABLET | Refills: 0 | Status: SHIPPED | OUTPATIENT
Start: 2020-09-15 | End: 2020-09-17 | Stop reason: SDUPTHER

## 2020-09-17 RX ORDER — BACLOFEN 10 MG/1
10 TABLET ORAL 3 TIMES DAILY
Qty: 90 TABLET | Refills: 0 | Status: SHIPPED | OUTPATIENT
Start: 2020-09-17 | End: 2020-11-10

## 2020-09-22 ENCOUNTER — OFFICE VISIT (OUTPATIENT)
Dept: PAIN MEDICINE | Facility: CLINIC | Age: 65
End: 2020-09-22

## 2020-09-22 VITALS
HEART RATE: 74 BPM | SYSTOLIC BLOOD PRESSURE: 115 MMHG | HEIGHT: 67 IN | TEMPERATURE: 97.3 F | DIASTOLIC BLOOD PRESSURE: 75 MMHG | BODY MASS INDEX: 30.61 KG/M2 | OXYGEN SATURATION: 97 % | WEIGHT: 195 LBS | RESPIRATION RATE: 16 BRPM

## 2020-09-22 DIAGNOSIS — M79.7 FIBROMYOSITIS: ICD-10-CM

## 2020-09-22 DIAGNOSIS — M54.17 LUMBOSACRAL RADICULOPATHY: ICD-10-CM

## 2020-09-22 DIAGNOSIS — M25.552 HIP PAIN, LEFT: ICD-10-CM

## 2020-09-22 DIAGNOSIS — Z79.899 OTHER LONG TERM (CURRENT) DRUG THERAPY: ICD-10-CM

## 2020-09-22 DIAGNOSIS — M50.10 CERVICAL DISC DISORDER WITH RADICULOPATHY: ICD-10-CM

## 2020-09-22 DIAGNOSIS — M70.61 GREATER TROCHANTERIC BURSITIS, RIGHT: ICD-10-CM

## 2020-09-22 DIAGNOSIS — M47.817 OSTEOARTHRITIS OF LUMBOSACRAL SPINE WITHOUT MYELOPATHY: ICD-10-CM

## 2020-09-22 DIAGNOSIS — M54.40 CHRONIC BILATERAL LOW BACK PAIN WITH SCIATICA, SCIATICA LATERALITY UNSPECIFIED: Primary | ICD-10-CM

## 2020-09-22 DIAGNOSIS — M51.36 DEGENERATION OF INTERVERTEBRAL DISC OF LUMBAR REGION: ICD-10-CM

## 2020-09-22 DIAGNOSIS — G89.29 CHRONIC BILATERAL LOW BACK PAIN WITH SCIATICA, SCIATICA LATERALITY UNSPECIFIED: Primary | ICD-10-CM

## 2020-09-22 DIAGNOSIS — M54.2 NECK PAIN: ICD-10-CM

## 2020-09-22 PROCEDURE — 99214 OFFICE O/P EST MOD 30 MIN: CPT | Performed by: PHYSICAL MEDICINE & REHABILITATION

## 2020-09-22 PROCEDURE — G0463 HOSPITAL OUTPT CLINIC VISIT: HCPCS | Performed by: PHYSICAL MEDICINE & REHABILITATION

## 2020-09-22 RX ORDER — CYANOCOBALAMIN 1000 UG/ML
INJECTION, SOLUTION INTRAMUSCULAR; SUBCUTANEOUS
COMMUNITY
Start: 2020-09-05

## 2020-09-22 RX ORDER — LIDOCAINE 50 MG/G
1 PATCH TOPICAL EVERY 24 HOURS
Qty: 90 PATCH | Refills: 2 | Status: SHIPPED | OUTPATIENT
Start: 2020-09-22 | End: 2022-05-12

## 2020-09-22 RX ORDER — ERGOCALCIFEROL 1.25 MG/1
CAPSULE ORAL
COMMUNITY
Start: 2020-09-05

## 2020-09-22 NOTE — PROGRESS NOTES
Subjective   Tahira Ramirez is a 65 y.o. female.     Back/neck pain. Began years ago, 10/10 at worst, 5/10 at best, always present, varies, radiates to BUE and b/l hips, aching burning, stabbing, worse with all movement, interferes with ADLs, work, failed chiropractor, massage, course of PT in July 2017 and March 2018. Saw Dr. Meeks, as above, referred for cervical ESIs, also nonsurgical. MRI C-spine with multilevel DDD, DJD. Seen by Dr. Thompson in 2016. Denies anticoagulation, allergy to iodine or contrast, current infection or ABX. Had cervical TYRA with resolution of pain and numbness into BUE, then LESI x 2 with improvement. Rereferred for b/l L1-3 MBB and RFA by Dr. Meeks, had left then right RFA with tremendous relief. New severe muscle spasms in back, failed Baclofen 10mg BID, now 10mg TID. New pain in lower LBP improves with lumbar flexion, told her L TIFFANI is intact.       The following portions of the patient's history were reviewed and updated as appropriate: allergies, current medications, past family history, past medical history, past social history, past surgical history and problem list.    Review of Systems   Constitutional: Negative for chills, fatigue and fever.   HENT: Negative for hearing loss and trouble swallowing.    Eyes: Negative for visual disturbance.   Respiratory: Negative for shortness of breath.    Cardiovascular: Negative for chest pain.   Gastrointestinal: Negative for abdominal pain, constipation, diarrhea, nausea and vomiting.   Genitourinary: Negative for urinary incontinence.   Musculoskeletal: Positive for back pain and myalgias. Negative for arthralgias, joint swelling and neck pain.   Neurological: Negative for dizziness, weakness, numbness and headache.       Objective   Physical Exam   Constitutional: She is oriented to person, place, and time.   Musculoskeletal:      Comments: (=) L NOMI and FADIR, mildly TTP b/l SIJ, strongly (+) b/l lumbar facet loading   Neurological: She  is alert and oriented to person, place, and time.   Psychiatric: Her behavior is normal. Thought content normal.         Assessment/Plan   Tahira was seen today for back pain.    Diagnoses and all orders for this visit:    Chronic bilateral low back pain with sciatica, sciatica laterality unspecified    Lumbosacral radiculopathy    Neck pain    Hip pain, left    Cervical disc disorder with radiculopathy    Degeneration of intervertebral disc of lumbar region    Fibromyositis    Greater trochanteric bursitis, right    Osteoarthritis of lumbosacral spine without myelopathy    Other long term (current) drug therapy        Had 1st cervical TYRA, then 2 ILESI.  Received MRI L-spine from [facility], appropriate for LESIs.  Performed 2 b/l L1-3 MBB per Dr. Meeks, 100% relief with both.  Performed left then right L1-3 RFA. Tremendous relief, still helping.  Schedule 1 b/l L5/S1 MBB for diagnostic purposes, may need b/l SIJ injection if ineffective.  Increased to Baclofen 10mg TID, may need to increase with PCP next week. Could no longer get Robaxin.  Begin Lidoderm q12h prn.  RTC for procedures.

## 2020-10-13 ENCOUNTER — APPOINTMENT (OUTPATIENT)
Dept: PAIN MEDICINE | Facility: HOSPITAL | Age: 65
End: 2020-10-13

## 2020-10-27 ENCOUNTER — HOSPITAL ENCOUNTER (OUTPATIENT)
Dept: PAIN MEDICINE | Facility: HOSPITAL | Age: 65
Discharge: HOME | End: 2020-10-27

## 2020-10-27 VITALS
DIASTOLIC BLOOD PRESSURE: 77 MMHG | HEART RATE: 69 BPM | HEIGHT: 67 IN | TEMPERATURE: 97.8 F | RESPIRATION RATE: 16 BRPM | SYSTOLIC BLOOD PRESSURE: 123 MMHG | WEIGHT: 190 LBS | OXYGEN SATURATION: 97 % | BODY MASS INDEX: 29.82 KG/M2

## 2020-10-27 DIAGNOSIS — R52 PAIN: ICD-10-CM

## 2020-10-27 DIAGNOSIS — M47.817 OSTEOARTHRITIS OF LUMBOSACRAL SPINE WITHOUT MYELOPATHY: ICD-10-CM

## 2020-10-27 DIAGNOSIS — G89.29 CHRONIC BILATERAL LOW BACK PAIN WITH SCIATICA, SCIATICA LATERALITY UNSPECIFIED: Primary | ICD-10-CM

## 2020-10-27 DIAGNOSIS — M54.40 CHRONIC BILATERAL LOW BACK PAIN WITH SCIATICA, SCIATICA LATERALITY UNSPECIFIED: Primary | ICD-10-CM

## 2020-10-27 PROCEDURE — 64493 INJ PARAVERT F JNT L/S 1 LEV: CPT | Performed by: PHYSICAL MEDICINE & REHABILITATION

## 2020-10-27 PROCEDURE — 77003 FLUOROGUIDE FOR SPINE INJECT: CPT

## 2020-10-27 RX ORDER — BUPIVACAINE HYDROCHLORIDE 2.5 MG/ML
INJECTION, SOLUTION EPIDURAL; INFILTRATION; INTRACAUDAL
Status: DISPENSED
Start: 2020-10-27 | End: 2020-10-27

## 2020-10-27 RX ORDER — BUPIVACAINE HYDROCHLORIDE 2.5 MG/ML
5 INJECTION, SOLUTION EPIDURAL; INFILTRATION; INTRACAUDAL ONCE
Status: COMPLETED | OUTPATIENT
Start: 2020-10-27 | End: 2020-10-27

## 2020-10-27 RX ADMIN — BUPIVACAINE HYDROCHLORIDE 5 ML: 2.5 INJECTION, SOLUTION EPIDURAL; INFILTRATION; INTRACAUDAL at 10:05

## 2020-10-27 NOTE — H&P
Patient Care Team:  Adan Madsen MD as PCP - General  Adan Madsen MD as PCP - Family Medicine    Chief complaint Low back pain    Subjective     Back/neck pain. Began years ago, 10/10 at worst, 5/10 at best, always present, varies, radiates to BUE and b/l hips, aching burning, stabbing, worse with all movement, interferes with ADLs, work, failed chiropractor, massage, course of PT in July 2017 and March 2018. Saw Dr. Meeks, as above, referred for cervical ESIs, also nonsurgical. MRI C-spine with multilevel DDD, DJD. Seen by Dr. Thompson in 2016. Denies anticoagulation, allergy to iodine or contrast, current infection or ABX. Had cervical TYRA with resolution of pain and numbness into BUE, then LESI x 2 with improvement. Rereferred for b/l L1-3 MBB and RFA by Dr. Meeks, had left then right RFA with tremendous relief. New severe muscle spasms in back, failed Baclofen 10mg BID, now 10mg TID. New pain in lower LBP improves with lumbar flexion, told her L TIFFANI is intact.      Review of Systems     Past Medical History:   Diagnosis Date   • Arthritis    • Chronic pain disorder    • Clot     history of blood clots   • Cyanocobalamine deficiency (non anemic)    • DDD (degenerative disc disease), cervical    • DDD (degenerative disc disease), lumbar    • Depression    • Fibromyalgia, primary    • Fractures     L5-L1   • Hypertension    • Low back pain    • Osteoarthritis    • Scoliosis    • Spinal stenosis      Past Surgical History:   Procedure Laterality Date   • ABDOMINAL MASS RESECTION     • GASTRIC BYPASS     • HIP SURGERY     • HIP SURGERY      replacement left   • HYSTERECTOMY     • NECK SURGERY     • OTHER SURGICAL HISTORY      cervical surgery   • VEIN SURGERY       Family History   Problem Relation Age of Onset   • Heart disease Mother    • Hypertension Mother    • Cancer Father      Social History     Tobacco Use   • Smoking status: Never Smoker   • Smokeless tobacco: Never Used    Substance Use Topics   • Alcohol use: No     Frequency: Never   • Drug use: Defer     Allergies:  Patient has no known allergies.    Objective      Vital Signs  Temp:  [97.8 °F (36.6 °C)] 97.8 °F (36.6 °C)  Heart Rate:  [87] 87  Resp:  [16] 16  BP: (119)/(76) 119/76    Physical Exam    Results Review:   None      Assessment/Plan       * No active hospital problems. *      ICD-10-CM ICD-9-CM   1. Chronic bilateral low back pain with sciatica, sciatica laterality unspecified  M54.40 724.2    G89.29 724.3     338.29   2. Osteoarthritis of lumbosacral spine without myelopathy  M47.817 721.3         Assessment & Plan    I discussed the patients findings and my recommendations with patient     Had 1st cervical TYRA, then 2 ILESI.  Received MRI L-spine from [facility], appropriate for [facility].  Performed 2 b/l L1-3 MBB per Dr. Meeks, 100% relief with both.  Performed left then right L1-3 RFA. Tremendous relief, still helping.  Perform 1 b/l L5/S1 MBB for diagnostic purposes, may need b/l SIJ injection if ineffective.  Increased to Baclofen 10mg TID, may need to increase with PCP next week. Could no longer get Robaxin.  Begin Lidoderm q12h prn.  RTC for procedures.      Lumbar Medial Branch Block    PREOPERATIVE DIAGNOSIS: Lumbar spondylosis    POSTOPERATIVE DIAGNOSIS: Lumbar spondylosis    PROCEDURE PERFORMED: Lumbar Medial Branch Block, bilateral L5/S1    The patient presents with a history of lumbosacral spondylosis [ bilaterally ] at level L5/S1.  The patient presents today for a lumbar medial branch block. This is the [ first ] procedure. The patient understands the risks and benefits of the procedure and wishes to proceed. The patient was seen in the preoperative area.  Patient's consent was obtained and updated.  Vitals were taken.  Patient was then brought to the procedure suite and placed in a prone position. The appropriate anatomic area was widely prepped with Chloroprep and draped in a sterile fashion.  Under  fluoroscopic guidance an AP view with caudad cephaled tilt was obtained. A 22 gauge curved tip spinal needle was passed through skin anesthetized with 1% Lidocaine without epinephrine. The needle tip was guided into the superior medial aspect of the transverse process at L5/S1.   At this point 0.5 mL of 0.25% Marcaine was injected. A sterile dressing was placed over the puncture sites. The patient tolerated the procedure with [ no complications ]. They were then brought to the post procedure area where they recovered nicely.    Approximately 25 minutes after procedure the patient reported [ 50 ]% relief from intervention.    Discharge:  The patient will be discharged home in stable condition.   Patient understands to contact the Center with any post procedure questions or concerns.  Discharge instructions given by nursing staff.      Reece Arora MD  10/27/20  09:46 EDT    Time: Discharge 15 min

## 2020-11-10 RX ORDER — BACLOFEN 10 MG/1
10 TABLET ORAL 3 TIMES DAILY
Qty: 90 TABLET | Refills: 0 | Status: SHIPPED | OUTPATIENT
Start: 2020-11-10 | End: 2020-12-07

## 2020-11-20 ENCOUNTER — TELEPHONE (OUTPATIENT)
Dept: PAIN MEDICINE | Facility: HOSPITAL | Age: 65
End: 2020-11-20

## 2020-11-20 ENCOUNTER — HOSPITAL ENCOUNTER (OUTPATIENT)
Dept: PAIN MEDICINE | Facility: HOSPITAL | Age: 65
Discharge: HOME | End: 2020-11-20

## 2020-11-20 VITALS
BODY MASS INDEX: 29.82 KG/M2 | TEMPERATURE: 96.9 F | HEIGHT: 67 IN | OXYGEN SATURATION: 100 % | HEART RATE: 64 BPM | DIASTOLIC BLOOD PRESSURE: 67 MMHG | SYSTOLIC BLOOD PRESSURE: 140 MMHG | RESPIRATION RATE: 16 BRPM | WEIGHT: 190 LBS

## 2020-11-20 DIAGNOSIS — R52 PAIN: ICD-10-CM

## 2020-11-20 DIAGNOSIS — G89.29 CHRONIC BILATERAL LOW BACK PAIN WITH SCIATICA, SCIATICA LATERALITY UNSPECIFIED: Primary | ICD-10-CM

## 2020-11-20 DIAGNOSIS — M47.817 OSTEOARTHRITIS OF LUMBOSACRAL SPINE WITHOUT MYELOPATHY: ICD-10-CM

## 2020-11-20 DIAGNOSIS — M54.40 CHRONIC BILATERAL LOW BACK PAIN WITH SCIATICA, SCIATICA LATERALITY UNSPECIFIED: Primary | ICD-10-CM

## 2020-11-20 PROCEDURE — 64493 INJ PARAVERT F JNT L/S 1 LEV: CPT | Performed by: PHYSICAL MEDICINE & REHABILITATION

## 2020-11-20 PROCEDURE — 77003 FLUOROGUIDE FOR SPINE INJECT: CPT

## 2020-11-20 RX ORDER — BUPIVACAINE HYDROCHLORIDE 2.5 MG/ML
INJECTION, SOLUTION EPIDURAL; INFILTRATION; INTRACAUDAL
Status: DISPENSED
Start: 2020-11-20 | End: 2020-11-20

## 2020-11-20 RX ORDER — BUPIVACAINE HYDROCHLORIDE 2.5 MG/ML
5 INJECTION, SOLUTION EPIDURAL; INFILTRATION; INTRACAUDAL ONCE
Status: COMPLETED | OUTPATIENT
Start: 2020-11-20 | End: 2020-11-20

## 2020-11-20 RX ADMIN — BUPIVACAINE HYDROCHLORIDE 2 ML: 2.5 INJECTION, SOLUTION EPIDURAL; INFILTRATION; INTRACAUDAL at 10:52

## 2020-11-20 NOTE — H&P
Patient Care Team:  Adan Madsen MD as PCP - General  Adan Madsen MD as PCP - Family Medicine    Chief complaint Low back pain    Subjective     Back/neck pain. Began years ago, 10/10 at worst, 5/10 at best, always present, varies, radiates to BUE and b/l hips, aching burning, stabbing, worse with all movement, interferes with ADLs, work, failed chiropractor, massage, course of PT in July 2017 and March 2018. Saw Dr. Meeks, as above, referred for cervical ESIs, also nonsurgical. MRI C-spine with multilevel DDD, DJD. Seen by Dr. Thompson in 2016. Denies anticoagulation, allergy to iodine or contrast, current infection or ABX. Had cervical TYRA with resolution of pain and numbness into BUE, then LESI x 2 with improvement. Rereferred for b/l L1-3 MBB and RFA by Dr. Meeks, had left then right RFA with tremendous relief. New severe muscle spasms in back, failed Baclofen 10mg BID, now 10mg TID. New pain in lower LBP improves with lumbar flexion, told her L TIFFANI is intact.      Review of Systems       Past Medical History:   Diagnosis Date   • Arthritis    • Chronic pain disorder    • Clot     history of blood clots   • Cyanocobalamine deficiency (non anemic)    • DDD (degenerative disc disease), cervical    • DDD (degenerative disc disease), lumbar    • Depression    • Fibromyalgia, primary    • Fractures     L5-L1   • Hypertension    • Low back pain    • Osteoarthritis    • Scoliosis    • Spinal stenosis      Past Surgical History:   Procedure Laterality Date   • ABDOMINAL MASS RESECTION     • GASTRIC BYPASS     • HIP SURGERY     • HIP SURGERY      replacement left   • HYSTERECTOMY     • NECK SURGERY     • OTHER SURGICAL HISTORY      cervical surgery   • VEIN SURGERY       Family History   Problem Relation Age of Onset   • Heart disease Mother    • Hypertension Mother    • Cancer Father      Social History     Tobacco Use   • Smoking status: Never Smoker   • Smokeless tobacco: Never Used    Substance Use Topics   • Alcohol use: No     Frequency: Never   • Drug use: Defer     Allergies:  Patient has no known allergies.    Objective      Vital Signs  Temp:  [96.9 °F (36.1 °C)] 96.9 °F (36.1 °C)  Heart Rate:  [69] 69  Resp:  [16] 16  BP: (133)/(75) 133/75    Physical Exam      Results Review:   None      Assessment/Plan       * No active hospital problems. *      ICD-10-CM ICD-9-CM   1. Chronic bilateral low back pain with sciatica, sciatica laterality unspecified  M54.40 724.2    G89.29 724.3     338.29   2. Osteoarthritis of lumbosacral spine without myelopathy  M47.817 721.3         Assessment & Plan      I discussed the patients findings and my recommendations with patient     Had 1st cervical TYRA, then 2 ILESI.  Received MRI L-spine from [facility], appropriate for [facility].  Performed 2 b/l L1-3 MBB per Dr. Meeks, 100% relief with both.  Performed left then right L1-3 RFA. Tremendous relief, still helping.  Perform 2nd b/l L5/S1 MBB for diagnostic purposes, may need b/l SIJ injection if ineffective.  Increased to Baclofen 10mg TID, may need to increase with PCP next week. Could no longer get Robaxin.  Began Lidoderm q12h prn.  RTC for procedures.      Lumbar Medial Branch Block    PREOPERATIVE DIAGNOSIS: Lumbar spondylosis    POSTOPERATIVE DIAGNOSIS: Lumbar spondylosis    PROCEDURE PERFORMED: Lumbar Medial Branch Block, bilateral L5/S1    The patient presents with a history of lumbosacral spondylosis [ bilaterally ] at level L5/S1.  The patient presents today for a lumbar medial branch block. This is the [ second ] procedure. The patient understands the risks and benefits of the procedure and wishes to proceed. The patient was seen in the preoperative area.  Patient's consent was obtained and updated.  Vitals were taken.  Patient was then brought to the procedure suite and placed in a prone position. The appropriate anatomic area was widely prepped with Chloroprep and draped in a sterile fashion.   Under fluoroscopic guidance an AP view with caudad cephaled tilt was obtained. A 22 gauge curved tip spinal needle was passed through skin anesthetized with 1% Lidocaine without epinephrine. The needle tip was guided into the superior medial aspect of the transverse process at L5/S1.   At this point 0.5 mL of 0.25% Marcaine was injected. A sterile dressing was placed over the puncture sites. The patient tolerated the procedure with [ no complications ]. They were then brought to the post procedure area where they recovered nicely.    Approximately 25 minutes after procedure the patient reported [  ]% relief from intervention.    Discharge:  The patient will be discharged home in stable condition.   Patient understands to contact the Center with any post procedure questions or concerns.  Discharge instructions given by nursing staff.      Reece Arora MD  11/20/20  10:38 EST    Time: Discharge 15 min

## 2020-12-07 RX ORDER — BACLOFEN 10 MG/1
10 TABLET ORAL 3 TIMES DAILY
Qty: 90 TABLET | Refills: 0 | Status: SHIPPED | OUTPATIENT
Start: 2020-12-07 | End: 2021-01-06

## 2021-01-06 RX ORDER — BACLOFEN 10 MG/1
10 TABLET ORAL 3 TIMES DAILY
Qty: 90 TABLET | Refills: 0 | Status: SHIPPED | OUTPATIENT
Start: 2021-01-06 | End: 2021-02-01

## 2021-01-12 ENCOUNTER — HOSPITAL ENCOUNTER (OUTPATIENT)
Dept: PAIN MEDICINE | Facility: HOSPITAL | Age: 66
Discharge: HOME | End: 2021-01-12

## 2021-01-12 VITALS
HEIGHT: 67 IN | RESPIRATION RATE: 16 BRPM | BODY MASS INDEX: 31.39 KG/M2 | WEIGHT: 200 LBS | OXYGEN SATURATION: 100 % | TEMPERATURE: 96.8 F | DIASTOLIC BLOOD PRESSURE: 62 MMHG | SYSTOLIC BLOOD PRESSURE: 128 MMHG | HEART RATE: 64 BPM

## 2021-01-12 DIAGNOSIS — M47.817 OSTEOARTHRITIS OF LUMBOSACRAL SPINE WITHOUT MYELOPATHY: ICD-10-CM

## 2021-01-12 DIAGNOSIS — R52 PAIN: ICD-10-CM

## 2021-01-12 DIAGNOSIS — M54.40 CHRONIC BILATERAL LOW BACK PAIN WITH SCIATICA, SCIATICA LATERALITY UNSPECIFIED: Primary | ICD-10-CM

## 2021-01-12 DIAGNOSIS — G89.29 CHRONIC BILATERAL LOW BACK PAIN WITH SCIATICA, SCIATICA LATERALITY UNSPECIFIED: Primary | ICD-10-CM

## 2021-01-12 PROCEDURE — 64635 DESTROY LUMB/SAC FACET JNT: CPT | Performed by: PHYSICAL MEDICINE & REHABILITATION

## 2021-01-12 PROCEDURE — 25010000003 LIDOCAINE 1 % SOLUTION: Performed by: PHYSICAL MEDICINE & REHABILITATION

## 2021-01-12 PROCEDURE — 77003 FLUOROGUIDE FOR SPINE INJECT: CPT

## 2021-01-12 PROCEDURE — 25010000002 METHYLPREDNISOLONE PER 40 MG

## 2021-01-12 RX ORDER — METHYLPREDNISOLONE ACETATE 40 MG/ML
40 INJECTION, SUSPENSION INTRA-ARTICULAR; INTRALESIONAL; INTRAMUSCULAR; SOFT TISSUE ONCE
Status: COMPLETED | OUTPATIENT
Start: 2021-01-12 | End: 2021-01-12

## 2021-01-12 RX ORDER — GABAPENTIN 600 MG/1
TABLET ORAL
COMMUNITY
Start: 2021-01-04 | End: 2021-10-28 | Stop reason: DRUGHIGH

## 2021-01-12 RX ORDER — BUPIVACAINE HYDROCHLORIDE 2.5 MG/ML
INJECTION, SOLUTION EPIDURAL; INFILTRATION; INTRACAUDAL
Status: DISPENSED
Start: 2021-01-12 | End: 2021-01-12

## 2021-01-12 RX ORDER — LIDOCAINE HYDROCHLORIDE 10 MG/ML
5 INJECTION, SOLUTION INFILTRATION; PERINEURAL ONCE
Status: COMPLETED | OUTPATIENT
Start: 2021-01-12 | End: 2021-01-12

## 2021-01-12 RX ORDER — LORATADINE 10 MG/1
TABLET ORAL
COMMUNITY
Start: 2020-12-31

## 2021-01-12 RX ORDER — LIDOCAINE HYDROCHLORIDE 10 MG/ML
INJECTION, SOLUTION EPIDURAL; INFILTRATION; INTRACAUDAL; PERINEURAL
Status: DISPENSED
Start: 2021-01-12 | End: 2021-01-12

## 2021-01-12 RX ORDER — DIAZEPAM 5 MG/1
TABLET ORAL
COMMUNITY
Start: 2021-01-04 | End: 2022-05-12

## 2021-01-12 RX ORDER — METHYLPREDNISOLONE ACETATE 40 MG/ML
INJECTION, SUSPENSION INTRA-ARTICULAR; INTRALESIONAL; INTRAMUSCULAR; SOFT TISSUE
Status: DISPENSED
Start: 2021-01-12 | End: 2021-01-12

## 2021-01-12 RX ORDER — BUPIVACAINE HYDROCHLORIDE 2.5 MG/ML
5 INJECTION, SOLUTION EPIDURAL; INFILTRATION; INTRACAUDAL ONCE
Status: COMPLETED | OUTPATIENT
Start: 2021-01-12 | End: 2021-01-12

## 2021-01-12 RX ORDER — PANTOPRAZOLE SODIUM 40 MG/1
TABLET, DELAYED RELEASE ORAL
COMMUNITY
Start: 2020-12-31

## 2021-01-12 RX ADMIN — BUPIVACAINE HYDROCHLORIDE 1 ML: 2.5 INJECTION, SOLUTION EPIDURAL; INFILTRATION; INTRACAUDAL at 11:32

## 2021-01-12 RX ADMIN — METHYLPREDNISOLONE ACETATE 40 MG: 40 INJECTION, SUSPENSION INTRA-ARTICULAR; INTRALESIONAL; INTRAMUSCULAR; SOFT TISSUE at 11:32

## 2021-01-12 RX ADMIN — LIDOCAINE HYDROCHLORIDE 5 ML: 10 INJECTION, SOLUTION INFILTRATION; PERINEURAL at 11:23

## 2021-01-12 NOTE — H&P
Patient Care Team:  Adan Madsen MD as PCP - General  Adan Madsen MD as PCP - Family Medicine    Chief complaint Low back pain    Subjective     Back/neck pain. Began years ago, 10/10 at worst, 5/10 at best, always present, varies, radiates to BUE and b/l hips, aching burning, stabbing, worse with all movement, interferes with ADLs, work, failed chiropractor, massage, course of PT in July 2017 and March 2018. Saw Dr. Meeks, as above, referred for cervical ESIs, also nonsurgical. MRI C-spine with multilevel DDD, DJD. Seen by Dr. Thompson in 2016. Denies anticoagulation, allergy to iodine or contrast, current infection or ABX. Had cervical TYRA with resolution of pain and numbness into BUE, then LESI x 2 with improvement. Rereferred for b/l L1-3 MBB and RFA by Dr. Meeks, had left then right RFA with tremendous relief. New severe muscle spasms in back, failed Baclofen 10mg BID, now 10mg TID. New pain in lower LBP improves with lumbar flexion, told her L TIFFANI is intact.      Review of Systems       Past Medical History:   Diagnosis Date   • Arthritis    • Chronic pain disorder    • Clot     history of blood clots   • Cyanocobalamine deficiency (non anemic)    • DDD (degenerative disc disease), cervical    • DDD (degenerative disc disease), lumbar    • Depression    • Fibromyalgia, primary    • Fractures     L5-L1   • Hypertension    • Low back pain    • Osteoarthritis    • Scoliosis    • Spinal stenosis      Past Surgical History:   Procedure Laterality Date   • ABDOMINAL MASS RESECTION     • GASTRIC BYPASS     • HIP SURGERY     • HIP SURGERY      replacement left   • HYSTERECTOMY     • NECK SURGERY     • OTHER SURGICAL HISTORY      cervical surgery   • VEIN SURGERY       Family History   Problem Relation Age of Onset   • Heart disease Mother    • Hypertension Mother    • Cancer Father      Social History     Tobacco Use   • Smoking status: Never Smoker   • Smokeless tobacco: Never Used    Substance Use Topics   • Alcohol use: No     Frequency: Never   • Drug use: Defer     Allergies:  Patient has no known allergies.    Objective      Vital Signs  Temp:  [96.8 °F (36 °C)] 96.8 °F (36 °C)  Heart Rate:  [72] 72  Resp:  [16] 16  BP: (127)/(76) 127/76    Physical Exam      Results Review:   None      Assessment/Plan       * No active hospital problems. *      ICD-10-CM ICD-9-CM   1. Chronic bilateral low back pain with sciatica, sciatica laterality unspecified  M54.40 724.2    G89.29 724.3     338.29   2. Osteoarthritis of lumbosacral spine without myelopathy  M47.817 721.3         Assessment & Plan      I discussed the patients findings and my recommendations with patient     Had 1st cervical TYRA, then 2 ILESI.  Received MRI L-spine from [facility], [facility] for [facility].  Performed 2 b/l L1-3 MBB per Dr. Meeks, 100% relief with both.  Performed left then right L1-3 RFA. Tremendous relief, still helping.  Performed 2nd b/l L5/S1 MBB for diagnostic purposes,   Perform left L5/S1 RFA today, RTC for right RFA, may need b/l SIJ injection if ineffective.  Increased to Baclofen 10mg TID, may need to increase with PCP next week. Could no longer get Robaxin.  Began Lidoderm q12h prn.  RTC for procedures.        Lumbar Radiofrequency Ablation    Pre-Procedure Diagnosis: Lumbar Facet Syndrome    Post-Procedure Diagnosis: Lumbar Facet Syndrome    Description of Procedure: Left L4, L5 medial branch radiofrequency ablation    Indications for Procedure: Lumbar Facet Disease    Anesthetic/Sedation: none         Description of Findings (include specimens removed, if any): Patient positioned   prone on fluoro table. After prepped w/ chloroprep and draped, a 20 G 100 mm   curved RF needle with 10 mm active tip directed under fluoro guidance to the   left L4 medial branch and L5 primary dorsal ramus respectively. Sensory and motor testing conducted at each level individually with sensation confined to the lower back.  Each needle then anesthetized with Lidocaine 1% 1ml, followed by a 180 sec pause. Lateral images before and after injection of Lidocaine compared to ensure no needle migration. All 2 levels were lesioned at same time.    Level     Impedance    Sensory(50 Hz)        Motor (2Hz)   Lesion    L4            163                  1.2v                           2.0v         80 degrees C/90 sec  L5     187                  0.4v                           2.0v         80 degrees C/90 sec    Injected 1 cc 1% Lidocaine at each level prior to lesioning followed by 180 second pause    Patient tolerated the procedure well. Vital signs remained stable throughout   the procedure. Sensorimotor exam of the left leg unchanged following the procedure.      Condition: Stable. Endorses good pain relief.    Reece Arora MD  01/12/21  10:59 EST    Time: Discharge 15 min

## 2021-01-19 ENCOUNTER — APPOINTMENT (OUTPATIENT)
Dept: PAIN MEDICINE | Facility: HOSPITAL | Age: 66
End: 2021-01-19

## 2021-01-19 ENCOUNTER — TELEPHONE (OUTPATIENT)
Dept: PAIN MEDICINE | Facility: CLINIC | Age: 66
End: 2021-01-19

## 2021-01-19 NOTE — TELEPHONE ENCOUNTER
Caller: JACOB TAVAREZ    Relationship to patient: SELF    Best call back number: [phone]    Type of visit: RADIO FREQUENCY ABLATION    Requested date: ANY     If rescheduling, when is the original appointment: 01/19/21     Additional notes:PT CALLED ADVISING HER TRANSPORTATION FELL THROUGH, UNABLE TO MAKE IT. WANTS TO RESCHEDULE.

## 2021-02-01 RX ORDER — BACLOFEN 10 MG/1
10 TABLET ORAL 3 TIMES DAILY
Qty: 90 TABLET | Refills: 0 | Status: SHIPPED | OUTPATIENT
Start: 2021-02-01 | End: 2021-03-01

## 2021-02-16 ENCOUNTER — APPOINTMENT (OUTPATIENT)
Dept: PAIN MEDICINE | Facility: HOSPITAL | Age: 66
End: 2021-02-16

## 2021-03-01 RX ORDER — BACLOFEN 10 MG/1
10 TABLET ORAL 3 TIMES DAILY
Qty: 90 TABLET | Refills: 0 | Status: SHIPPED | OUTPATIENT
Start: 2021-03-01 | End: 2021-03-30

## 2021-03-12 ENCOUNTER — TELEPHONE (OUTPATIENT)
Dept: PAIN MEDICINE | Facility: CLINIC | Age: 66
End: 2021-03-12

## 2021-03-12 NOTE — TELEPHONE ENCOUNTER
Caller: MRS TAVAREZ     Relationship to patient: PATIENT      Best call back number: [phone]    Type of visit: RADIO FREQUENCY ABLATION        If rescheduling, when is the original appointment: 3/30/21     Additional notes:PATIENT CALLED BACK REG HER APPT ON 3/3021 PATIENT NEEDS TO R/S SHE HAS A PCP APPT THAT MORNING AS WELL, TRIED TO WARM TRANSFER BUT NO ANSWER PLEASE CALL PATIENT BACK TO R/S

## 2021-03-16 ENCOUNTER — APPOINTMENT (OUTPATIENT)
Dept: PAIN MEDICINE | Facility: HOSPITAL | Age: 66
End: 2021-03-16

## 2021-03-30 ENCOUNTER — APPOINTMENT (OUTPATIENT)
Dept: PAIN MEDICINE | Facility: HOSPITAL | Age: 66
End: 2021-03-30

## 2021-03-30 RX ORDER — BACLOFEN 10 MG/1
10 TABLET ORAL 3 TIMES DAILY
Qty: 90 TABLET | Refills: 0 | Status: SHIPPED | OUTPATIENT
Start: 2021-03-30 | End: 2021-04-27

## 2021-04-16 ENCOUNTER — TELEPHONE (OUTPATIENT)
Dept: PAIN MEDICINE | Facility: CLINIC | Age: 66
End: 2021-04-16

## 2021-04-16 DIAGNOSIS — M54.40 CHRONIC BILATERAL LOW BACK PAIN WITH SCIATICA, SCIATICA LATERALITY UNSPECIFIED: Primary | ICD-10-CM

## 2021-04-16 DIAGNOSIS — G89.29 CHRONIC BILATERAL LOW BACK PAIN WITH SCIATICA, SCIATICA LATERALITY UNSPECIFIED: Primary | ICD-10-CM

## 2021-04-16 NOTE — TELEPHONE ENCOUNTER
Provider: DR BENNETT   Caller: MRS TAVAREZ   Relationship to Patient: PATIENT   Phone Number: [phone]   Reason for Call: PATIENT CALLED IN STATING SHE HAD A FALL ABOUT  A MONTH AGO SHE BLACKED OUT IN THE BATHROOM AND WHEN SHE FELL SHE HIT THE TOILET WITH HER BACK AND NOW HER BACK HAS GOTTEN WORST, SO PATIENT WAS CALLING TO SEE IF DR BENNETT WOULD LIKE TO ORDER A MRI ON HER BACK TO SEE IF ANYTHING HAS CHANGED OR IF SHE NEEDS TO FOLLOW UP WITH HER PCP ABOUT AN MRI. PLEASE CALL PATIENT BACK @ THE ABOVE NUMBER TO DISCUSS.   When was the patient last seen: 1/12/21

## 2021-04-20 ENCOUNTER — HOSPITAL ENCOUNTER (OUTPATIENT)
Dept: PAIN MEDICINE | Facility: HOSPITAL | Age: 66
Discharge: HOME | End: 2021-04-20

## 2021-04-20 VITALS
SYSTOLIC BLOOD PRESSURE: 122 MMHG | WEIGHT: 190 LBS | RESPIRATION RATE: 16 BRPM | HEIGHT: 67 IN | HEART RATE: 71 BPM | DIASTOLIC BLOOD PRESSURE: 79 MMHG | BODY MASS INDEX: 29.82 KG/M2 | OXYGEN SATURATION: 99 % | TEMPERATURE: 97.1 F

## 2021-04-20 DIAGNOSIS — G89.29 CHRONIC BILATERAL LOW BACK PAIN WITH SCIATICA, SCIATICA LATERALITY UNSPECIFIED: Primary | ICD-10-CM

## 2021-04-20 DIAGNOSIS — R52 PAIN: ICD-10-CM

## 2021-04-20 DIAGNOSIS — M54.40 CHRONIC BILATERAL LOW BACK PAIN WITH SCIATICA, SCIATICA LATERALITY UNSPECIFIED: Primary | ICD-10-CM

## 2021-04-20 DIAGNOSIS — M47.817 OSTEOARTHRITIS OF LUMBOSACRAL SPINE WITHOUT MYELOPATHY: ICD-10-CM

## 2021-04-20 PROCEDURE — 77003 FLUOROGUIDE FOR SPINE INJECT: CPT

## 2021-04-20 PROCEDURE — 64635 DESTROY LUMB/SAC FACET JNT: CPT | Performed by: PHYSICAL MEDICINE & REHABILITATION

## 2021-04-20 PROCEDURE — 25010000002 METHYLPREDNISOLONE PER 40 MG

## 2021-04-20 RX ORDER — SODIUM POLYSTYRENE SULFONATE 15 G/60ML
SUSPENSION ORAL; RECTAL
COMMUNITY
Start: 2021-03-02 | End: 2022-05-12

## 2021-04-20 RX ORDER — METHYLPREDNISOLONE ACETATE 40 MG/ML
INJECTION, SUSPENSION INTRA-ARTICULAR; INTRALESIONAL; INTRAMUSCULAR; SOFT TISSUE
Status: DISPENSED
Start: 2021-04-20 | End: 2021-04-20

## 2021-04-20 RX ORDER — BUPIVACAINE HYDROCHLORIDE 2.5 MG/ML
INJECTION, SOLUTION EPIDURAL; INFILTRATION; INTRACAUDAL
Status: DISPENSED
Start: 2021-04-20 | End: 2021-04-20

## 2021-04-20 RX ORDER — LIDOCAINE HYDROCHLORIDE 10 MG/ML
5 INJECTION, SOLUTION EPIDURAL; INFILTRATION; INTRACAUDAL; PERINEURAL ONCE
Status: COMPLETED | OUTPATIENT
Start: 2021-04-20 | End: 2021-04-20

## 2021-04-20 RX ORDER — LIDOCAINE HYDROCHLORIDE 10 MG/ML
INJECTION, SOLUTION EPIDURAL; INFILTRATION; INTRACAUDAL; PERINEURAL
Status: DISPENSED
Start: 2021-04-20 | End: 2021-04-20

## 2021-04-20 RX ORDER — BUPIVACAINE HYDROCHLORIDE 2.5 MG/ML
5 INJECTION, SOLUTION EPIDURAL; INFILTRATION; INTRACAUDAL ONCE
Status: COMPLETED | OUTPATIENT
Start: 2021-04-20 | End: 2021-04-20

## 2021-04-20 RX ORDER — CALCIUM CARBONATE 500(1250)
TABLET ORAL
COMMUNITY
Start: 2021-04-07

## 2021-04-20 RX ORDER — METHYLPREDNISOLONE ACETATE 40 MG/ML
40 INJECTION, SUSPENSION INTRA-ARTICULAR; INTRALESIONAL; INTRAMUSCULAR; SOFT TISSUE ONCE
Status: COMPLETED | OUTPATIENT
Start: 2021-04-20 | End: 2021-04-20

## 2021-04-20 RX ORDER — IBUPROFEN 200 MG
1 CAPSULE ORAL
COMMUNITY
Start: 2021-03-10 | End: 2021-04-20

## 2021-04-20 RX ADMIN — LIDOCAINE HYDROCHLORIDE 5 ML: 10 INJECTION, SOLUTION EPIDURAL; INFILTRATION; INTRACAUDAL; PERINEURAL at 10:44

## 2021-04-20 RX ADMIN — METHYLPREDNISOLONE ACETATE 40 MG: 40 INJECTION, SUSPENSION INTRA-ARTICULAR; INTRALESIONAL; INTRAMUSCULAR; SOFT TISSUE at 10:57

## 2021-04-20 RX ADMIN — BUPIVACAINE HYDROCHLORIDE 5 ML: 2.5 INJECTION, SOLUTION EPIDURAL; INFILTRATION; INTRACAUDAL at 10:57

## 2021-04-20 NOTE — H&P
Patient Care Team:  Adan Madsen MD as PCP - General  Adan Madsen MD as PCP - Family Medicine    Chief complaint Low back pain    Subjective     Back/neck pain. Began years ago, 10/10 at worst, 5/10 at best, always present, varies, radiates to BUE and b/l hips, aching burning, stabbing, worse with all movement, interferes with ADLs, work, failed chiropractor, massage, course of PT in July 2017 and March 2018. Saw Dr. Meeks, as above, referred for cervical ESIs, also nonsurgical. MRI C-spine with multilevel DDD, DJD. Seen by Dr. Thompson in 2016. Denies anticoagulation, allergy to iodine or contrast, current infection or ABX. Had cervical TYRA with resolution of pain and numbness into BUE, then LESI x 2 with improvement. Rereferred for b/l L1-3 MBB and RFA by Dr. Meeks, had left then right RFA with tremendous relief. New severe muscle spasms in back, failed Baclofen 10mg BID, now 10mg TID. New pain in lower LBP improves with lumbar flexion, told her L TIFFANI is intact.      Review of Systems     Past Medical History:   Diagnosis Date   • Arthritis    • Chronic pain disorder    • Clot     history of blood clots   • Cyanocobalamine deficiency (non anemic)    • DDD (degenerative disc disease), cervical    • DDD (degenerative disc disease), lumbar    • Depression    • Fibromyalgia, primary    • Fractures     L5-L1   • Hypertension    • Low back pain    • Osteoarthritis    • Scoliosis    • Spinal stenosis      Past Surgical History:   Procedure Laterality Date   • ABDOMINAL MASS RESECTION     • GASTRIC BYPASS     • HIP SURGERY     • HIP SURGERY      replacement left   • HYSTERECTOMY     • NECK SURGERY     • OTHER SURGICAL HISTORY      cervical surgery   • VEIN SURGERY       Family History   Problem Relation Age of Onset   • Heart disease Mother    • Hypertension Mother    • Cancer Father      Social History     Tobacco Use   • Smoking status: Never Smoker   • Smokeless tobacco: Never Used   Vaping  Use   • Vaping Use: Never used   Substance Use Topics   • Alcohol use: No   • Drug use: Defer     Allergies:  Patient has no known allergies.    Objective      Vital Signs  Temp:  [97.1 °F (36.2 °C)] 97.1 °F (36.2 °C)  Heart Rate:  [73] 73  BP: (136)/(80) 136/80    Physical Exam    Results Review:   None      Assessment/Plan       * No active hospital problems. *      ICD-10-CM ICD-9-CM   1. Chronic bilateral low back pain with sciatica, sciatica laterality unspecified  M54.40 724.2    G89.29 724.3     338.29   2. Osteoarthritis of lumbosacral spine without myelopathy  M47.817 721.3         Assessment & Plan    I discussed the patients findings and my recommendations with patient     Had 1st cervical TYRA, then 2 ILESI.  Received MRI L-spine from [facility], appropriate for [facility].  Performed 2 b/l L1-3 MBB per Dr. Meeks, 100% relief with both.  Performed left then right L1-3 RFA. Tremendous relief, still helping.  Performed 2nd b/l L5/S1 MBB for diagnostic purposes,   Performed left L5/S1 RFA, perform on right today, may need b/l SIJ injection if ineffective.  Increased to Baclofen 10mg TID, may need to increase with PCP. Could no longer get Robaxin.  Began Lidoderm q12h prn.  RTC in 2 months for f/u        Lumbar Radiofrequency Ablation    Pre-Procedure Diagnosis: Lumbar Facet Syndrome    Post-Procedure Diagnosis: Lumbar Facet Syndrome    Description of Procedure: Right L4, L5 medial branch radiofrequency ablation    Indications for Procedure: Lumbar Facet Disease    Anesthetic/Sedation: none         Description of Findings (include specimens removed, if any): Patient positioned   prone on fluoro table. After prepped w/ chloroprep and draped, a 20 G 100 mm   curved RF needle with 10 mm active tip directed under fluoro guidance to the   Right L4 medial branch and L5 primary dorsal ramus respectively. Sensory and motor testing conducted at each level individually with sensation confined to the lower back. Each needle  then anesthetized with Lidocaine 1% 1ml, followed by a 180 sec pause. Lateral images before and after injection of Lidocaine compared to ensure no needle migration. All 2 levels were lesioned at same time.    Level     Impedance    Sensory(50 Hz)        Motor (2Hz)   Lesion    L4            170                  1.0v                           2.0v         80 degrees C/90 sec  L5     286                  0.2v                           2.0v         80 degrees C/90 sec    Injected 1 cc 1% Lidocaine at each level prior to lesioning followed by 180 second pause    Patient tolerated the procedure well. Vital signs remained stable throughout   the procedure. Sensorimotor exam of the right leg unchanged following the procedure.      Condition: Stable. Endorses good pain relief.    Reece Arora MD  04/20/21  10:31 EDT    Time: Discharge 15 min

## 2021-04-27 RX ORDER — BACLOFEN 10 MG/1
10 TABLET ORAL 3 TIMES DAILY
Qty: 90 TABLET | Refills: 0 | Status: SHIPPED | OUTPATIENT
Start: 2021-04-27 | End: 2021-05-25

## 2021-05-07 ENCOUNTER — HOSPITAL ENCOUNTER (OUTPATIENT)
Dept: MRI IMAGING | Facility: HOSPITAL | Age: 66
Discharge: HOME | End: 2021-05-07

## 2021-05-07 DIAGNOSIS — M54.40 CHRONIC BILATERAL LOW BACK PAIN WITH SCIATICA, SCIATICA LATERALITY UNSPECIFIED: ICD-10-CM

## 2021-05-07 DIAGNOSIS — G89.29 CHRONIC BILATERAL LOW BACK PAIN WITH SCIATICA, SCIATICA LATERALITY UNSPECIFIED: ICD-10-CM

## 2021-05-25 RX ORDER — BACLOFEN 10 MG/1
10 TABLET ORAL 3 TIMES DAILY
Qty: 90 TABLET | Refills: 0 | Status: SHIPPED | OUTPATIENT
Start: 2021-05-25 | End: 2021-06-18

## 2021-06-02 ENCOUNTER — HOSPITAL ENCOUNTER (OUTPATIENT)
Dept: MRI IMAGING | Facility: HOSPITAL | Age: 66
Discharge: HOME | End: 2021-06-02
Admitting: PHYSICAL MEDICINE & REHABILITATION

## 2021-06-02 PROCEDURE — 72148 MRI LUMBAR SPINE W/O DYE: CPT

## 2021-06-16 ENCOUNTER — TELEPHONE (OUTPATIENT)
Dept: PAIN MEDICINE | Facility: CLINIC | Age: 66
End: 2021-06-16

## 2021-06-16 NOTE — TELEPHONE ENCOUNTER
Caller: JACOB TAVAREZ    Relationship to patient: SELF    Best call back number: [phone]    Chief complaint: TELEPHONE VISIT    Type of visit: TELEPHONE VISIT    Requested date: 06.18.21     If rescheduling, when is the original appointment: 06.18.21     Additional notes: PT PREFERS OVER THE PHONE INSTEAD OF IN PERSON IF POSSIBLE.

## 2021-06-18 ENCOUNTER — OFFICE VISIT (OUTPATIENT)
Dept: PAIN MEDICINE | Facility: CLINIC | Age: 66
End: 2021-06-18

## 2021-06-18 VITALS — WEIGHT: 190 LBS | HEIGHT: 67 IN | BODY MASS INDEX: 29.82 KG/M2

## 2021-06-18 DIAGNOSIS — M50.10 CERVICAL DISC DISORDER WITH RADICULOPATHY: ICD-10-CM

## 2021-06-18 DIAGNOSIS — M25.552 HIP PAIN, LEFT: ICD-10-CM

## 2021-06-18 DIAGNOSIS — M54.16 LUMBAR RADICULITIS: ICD-10-CM

## 2021-06-18 DIAGNOSIS — M79.7 FIBROMYOSITIS: ICD-10-CM

## 2021-06-18 DIAGNOSIS — M54.2 NECK PAIN: ICD-10-CM

## 2021-06-18 DIAGNOSIS — M51.36 DEGENERATION OF INTERVERTEBRAL DISC OF LUMBAR REGION: ICD-10-CM

## 2021-06-18 DIAGNOSIS — Z79.899 OTHER LONG TERM (CURRENT) DRUG THERAPY: ICD-10-CM

## 2021-06-18 DIAGNOSIS — M70.61 GREATER TROCHANTERIC BURSITIS, RIGHT: ICD-10-CM

## 2021-06-18 DIAGNOSIS — M54.40 CHRONIC BILATERAL LOW BACK PAIN WITH SCIATICA, SCIATICA LATERALITY UNSPECIFIED: Primary | ICD-10-CM

## 2021-06-18 DIAGNOSIS — M54.17 LUMBOSACRAL RADICULOPATHY: ICD-10-CM

## 2021-06-18 DIAGNOSIS — M47.817 OSTEOARTHRITIS OF LUMBOSACRAL SPINE WITHOUT MYELOPATHY: ICD-10-CM

## 2021-06-18 DIAGNOSIS — G89.29 CHRONIC BILATERAL LOW BACK PAIN WITH SCIATICA, SCIATICA LATERALITY UNSPECIFIED: Primary | ICD-10-CM

## 2021-06-18 PROCEDURE — 99213 OFFICE O/P EST LOW 20 MIN: CPT | Performed by: PHYSICAL MEDICINE & REHABILITATION

## 2021-06-18 RX ORDER — METHYLPREDNISOLONE 4 MG/1
TABLET ORAL
Qty: 1 EACH | Refills: 0 | Status: SHIPPED | OUTPATIENT
Start: 2021-06-18 | End: 2022-05-12

## 2021-06-18 RX ORDER — BACLOFEN 20 MG/1
20 TABLET ORAL 3 TIMES DAILY
Qty: 90 TABLET | Refills: 1 | Status: SHIPPED | OUTPATIENT
Start: 2021-06-18 | End: 2021-08-13 | Stop reason: SDUPTHER

## 2021-06-18 NOTE — PROGRESS NOTES
Subjective   Tahira Ramirez is a 65 y.o. female.     Back/neck pain. Began years ago, 10/10 at worst, 5/10 at best, always present, varies, radiates to BUE and b/l hips, aching burning, stabbing, worse with all movement, interferes with ADLs, work, failed chiropractor, massage, course of PT in July 2017 and March 2018. Saw Dr. Meeks, as above, referred for cervical ESIs, also nonsurgical. MRI C-spine with multilevel DDD, DJD. Seen by Dr. Thompson in 2016. Denies anticoagulation, allergy to iodine or contrast, current infection or ABX. Had cervical TYRA with resolution of pain and numbness into BUE, then LESI x 2 with improvement. Rereferred for b/l L1-3 MBB and RFA by Dr. Meeks, had left then right RFA with tremendous relief. New severe muscle spasms in back, failed Baclofen 10mg BID, now 10mg TID. New pain in lower LBP improves with lumbar flexion, told her L TIFFANI is intact.       The following portions of the patient's history were reviewed and updated as appropriate: allergies, current medications, past family history, past medical history, past social history, past surgical history and problem list.    Review of Systems   Constitutional: Negative for chills, fatigue and fever.   HENT: Negative for hearing loss and trouble swallowing.    Eyes: Negative for visual disturbance.   Respiratory: Negative for shortness of breath.    Cardiovascular: Negative for chest pain.   Gastrointestinal: Negative for abdominal pain, constipation, diarrhea, nausea and vomiting.   Genitourinary: Negative for urinary incontinence.   Musculoskeletal: Positive for back pain and myalgias. Negative for arthralgias, joint swelling and neck pain.   Neurological: Negative for dizziness, weakness, numbness and headache.       Objective   Physical Exam   Constitutional: She is oriented to person, place, and time.   Neurological: She is alert and oriented to person, place, and time.   Psychiatric: Her behavior is normal. Mood, judgment and thought  content normal.         Assessment/Plan   Diagnoses and all orders for this visit:    1. Chronic bilateral low back pain with sciatica, sciatica laterality unspecified (Primary)    2. Neck pain    3. Cervical disc disorder with radiculopathy    4. Degeneration of intervertebral disc of lumbar region    5. Fibromyositis    6. Greater trochanteric bursitis, right    7. Hip pain, left    8. Lumbar radiculitis    9. Lumbosacral radiculopathy    10. Osteoarthritis of lumbosacral spine without myelopathy    11. Other long term (current) drug therapy        Had 1st cervical TYRA, then 2 ILESI.  Received MRI L-spine from [facility], appropriate for LESIs.  Performed 2 b/l L1-3 MBB per Dr. Meeks, 100% relief with both.  Performed left then right L1-3 RFA. Tremendous relief, still helping.  Performed 2nd b/l L5/S1 MBB for diagnostic purposes,   Performed left L5/S1 RFA, performed on right, may need b/l SIJ injection if ineffective.  Increase to Baclofen 20mg TID. Could no longer get Robaxin.  Began Lidoderm q12h prn.  Discussed repeat LESIs vs TFESIs, she declines for now.  Begin Medrol Dose Pack.  Reviewed MRI L-spine results with patient.  RTC in 2 months for f/u. Telephone visit, spent 6 minutes discussing her plan of care, meds, procedures.

## 2021-06-23 RX ORDER — BACLOFEN 10 MG/1
10 TABLET ORAL 3 TIMES DAILY
Qty: 90 TABLET | Refills: 0 | Status: SHIPPED | OUTPATIENT
Start: 2021-06-23 | End: 2021-07-21

## 2021-07-21 RX ORDER — BACLOFEN 10 MG/1
10 TABLET ORAL 3 TIMES DAILY
Qty: 90 TABLET | Refills: 0 | Status: SHIPPED | OUTPATIENT
Start: 2021-07-21 | End: 2021-08-13

## 2021-08-13 ENCOUNTER — OFFICE VISIT (OUTPATIENT)
Dept: PAIN MEDICINE | Facility: CLINIC | Age: 66
End: 2021-08-13

## 2021-08-13 VITALS
OXYGEN SATURATION: 96 % | HEART RATE: 84 BPM | RESPIRATION RATE: 16 BRPM | SYSTOLIC BLOOD PRESSURE: 132 MMHG | BODY MASS INDEX: 29.82 KG/M2 | WEIGHT: 190 LBS | DIASTOLIC BLOOD PRESSURE: 74 MMHG | HEIGHT: 67 IN

## 2021-08-13 DIAGNOSIS — M54.40 CHRONIC BILATERAL LOW BACK PAIN WITH SCIATICA, SCIATICA LATERALITY UNSPECIFIED: Primary | ICD-10-CM

## 2021-08-13 DIAGNOSIS — M54.16 LUMBAR RADICULITIS: ICD-10-CM

## 2021-08-13 DIAGNOSIS — M79.7 FIBROMYOSITIS: ICD-10-CM

## 2021-08-13 DIAGNOSIS — M25.552 HIP PAIN, LEFT: ICD-10-CM

## 2021-08-13 DIAGNOSIS — M70.61 GREATER TROCHANTERIC BURSITIS, RIGHT: ICD-10-CM

## 2021-08-13 DIAGNOSIS — M54.2 NECK PAIN: ICD-10-CM

## 2021-08-13 DIAGNOSIS — G89.29 CHRONIC BILATERAL LOW BACK PAIN WITH SCIATICA, SCIATICA LATERALITY UNSPECIFIED: Primary | ICD-10-CM

## 2021-08-13 DIAGNOSIS — M47.817 OSTEOARTHRITIS OF LUMBOSACRAL SPINE WITHOUT MYELOPATHY: ICD-10-CM

## 2021-08-13 DIAGNOSIS — Z79.899 OTHER LONG TERM (CURRENT) DRUG THERAPY: ICD-10-CM

## 2021-08-13 DIAGNOSIS — M54.17 LUMBOSACRAL RADICULOPATHY: ICD-10-CM

## 2021-08-13 DIAGNOSIS — M51.36 DEGENERATION OF INTERVERTEBRAL DISC OF LUMBAR REGION: ICD-10-CM

## 2021-08-13 DIAGNOSIS — M50.10 CERVICAL DISC DISORDER WITH RADICULOPATHY: ICD-10-CM

## 2021-08-13 PROCEDURE — 99214 OFFICE O/P EST MOD 30 MIN: CPT | Performed by: PHYSICAL MEDICINE & REHABILITATION

## 2021-08-13 RX ORDER — IBUPROFEN 200 MG
CAPSULE ORAL
COMMUNITY
Start: 2021-08-05 | End: 2021-08-13

## 2021-08-13 RX ORDER — BACLOFEN 20 MG/1
20 TABLET ORAL 3 TIMES DAILY
Qty: 90 TABLET | Refills: 11 | Status: SHIPPED | OUTPATIENT
Start: 2021-08-13 | End: 2022-03-24 | Stop reason: SDUPTHER

## 2021-08-13 NOTE — PROGRESS NOTES
Subjective   Tahira Ramirez is a 66 y.o. female.     Back/neck pain. Began years ago, 10/10 at worst, 5/10 at best, always present, varies, radiates to BUE and b/l hips, aching burning, stabbing, worse with all movement, interferes with ADLs, work, failed chiropractor, massage, course of PT in July 2017 and March 2018. Saw Dr. Meeks, as above, referred for cervical ESIs, also nonsurgical. MRI C-spine with multilevel DDD, DJD. Seen by Dr. Thompson in 2016. Denies anticoagulation, allergy to iodine or contrast, current infection or ABX. Had cervical TYRA with resolution of pain and numbness into BUE, then LESI x 2 with improvement. Rereferred for b/l L1-3 MBB and RFA by Dr. Meeks, had left then right RFA with tremendous relief. New severe muscle spasms in back, failed Baclofen 10mg BID, now 10mg TID. New pain in lower LBP improves with lumbar flexion, told her L TIFFANI is intact.       The following portions of the patient's history were reviewed and updated as appropriate: allergies, current medications, past family history, past medical history, past social history, past surgical history and problem list.    Review of Systems   Constitutional: Negative for chills, fatigue and fever.   HENT: Negative for hearing loss and trouble swallowing.    Eyes: Negative for visual disturbance.   Respiratory: Negative for shortness of breath.    Cardiovascular: Negative for chest pain.   Gastrointestinal: Negative for abdominal pain, constipation, diarrhea, nausea and vomiting.   Genitourinary: Negative for urinary incontinence.   Musculoskeletal: Positive for back pain and myalgias. Negative for arthralgias, joint swelling and neck pain.   Neurological: Negative for dizziness, weakness, numbness and headache.       Objective   Physical Exam   Constitutional: She is oriented to person, place, and time. She appears well-developed and well-nourished.   HENT:   Head: Normocephalic and atraumatic.   Eyes: Pupils are equal, round, and  reactive to light.   Cardiovascular: Normal rate, regular rhythm and normal heart sounds.   Pulmonary/Chest: Effort normal and breath sounds normal.   Abdominal: Soft. Normal appearance and bowel sounds are normal. She exhibits no distension. There is no abdominal tenderness.   Musculoskeletal:      Comments: (=) L NOMI and FADIR, mildly TTP b/l SIJ, strongly (+) b/l lumbar facet loading   Neurological: She is alert and oriented to person, place, and time. She has normal reflexes. She displays normal reflexes. No sensory deficit.   Psychiatric: Her behavior is normal. Mood, judgment and thought content normal.         Assessment/Plan   Diagnoses and all orders for this visit:    1. Chronic bilateral low back pain with sciatica, sciatica laterality unspecified (Primary)    2. Neck pain    3. Cervical disc disorder with radiculopathy    4. Degeneration of intervertebral disc of lumbar region    5. Fibromyositis    6. Greater trochanteric bursitis, right    7. Hip pain, left    8. Lumbar radiculitis    9. Lumbosacral radiculopathy    10. Osteoarthritis of lumbosacral spine without myelopathy    11. Other long term (current) drug therapy        Had 1st cervical TYRA, then 2 ILESI.  Received MRI L-spine from [facility], appropriate for LESIs.  Performed 2 b/l L1-3 MBB per Dr. Meeks, 100% relief with both.  Performed left then right L1-3 RFA. Tremendous relief, still helping.  Performed 2nd b/l L5/S1 MBB for diagnostic purposes,   Performed left L5/S1 RFA, performed on right,   Schedule b/l SIJ injection.  Increased to Baclofen 20mg TID. Could no longer get Robaxin.  Began Lidoderm q12h prn.  Discussed repeat LESIs vs TFESIs, she declines for now.  Began Medrol Dose Pack.  Reviewed MRI L-spine results with patient.  RTC for injections.

## 2021-09-07 ENCOUNTER — HOSPITAL ENCOUNTER (OUTPATIENT)
Dept: PAIN MEDICINE | Facility: HOSPITAL | Age: 66
Discharge: HOME | End: 2021-09-07

## 2021-09-07 VITALS
RESPIRATION RATE: 16 BRPM | HEIGHT: 67 IN | HEART RATE: 68 BPM | TEMPERATURE: 97.3 F | OXYGEN SATURATION: 96 % | WEIGHT: 190 LBS | SYSTOLIC BLOOD PRESSURE: 131 MMHG | BODY MASS INDEX: 29.82 KG/M2 | DIASTOLIC BLOOD PRESSURE: 68 MMHG

## 2021-09-07 DIAGNOSIS — R52 PAIN: ICD-10-CM

## 2021-09-07 DIAGNOSIS — M46.1 SACROILIITIS (HCC): Primary | ICD-10-CM

## 2021-09-07 PROCEDURE — 0 IOPAMIDOL 41 % SOLUTION: Performed by: PHYSICAL MEDICINE & REHABILITATION

## 2021-09-07 PROCEDURE — 77003 FLUOROGUIDE FOR SPINE INJECT: CPT

## 2021-09-07 PROCEDURE — 27096 INJECT SACROILIAC JOINT: CPT | Performed by: PHYSICAL MEDICINE & REHABILITATION

## 2021-09-07 PROCEDURE — 25010000002 METHYLPREDNISOLONE PER 40 MG: Performed by: PHYSICAL MEDICINE & REHABILITATION

## 2021-09-07 RX ORDER — METHYLPREDNISOLONE ACETATE 40 MG/ML
40 INJECTION, SUSPENSION INTRA-ARTICULAR; INTRALESIONAL; INTRAMUSCULAR; SOFT TISSUE ONCE
Status: COMPLETED | OUTPATIENT
Start: 2021-09-07 | End: 2021-09-07

## 2021-09-07 RX ORDER — BUPIVACAINE HYDROCHLORIDE 2.5 MG/ML
5 INJECTION, SOLUTION EPIDURAL; INFILTRATION; INTRACAUDAL ONCE
Status: COMPLETED | OUTPATIENT
Start: 2021-09-07 | End: 2021-09-07

## 2021-09-07 RX ADMIN — IOPAMIDOL 1 ML: 408 INJECTION, SOLUTION INTRATHECAL at 11:09

## 2021-09-07 RX ADMIN — METHYLPREDNISOLONE ACETATE 40 MG: 40 INJECTION, SUSPENSION INTRA-ARTICULAR; INTRALESIONAL; INTRAMUSCULAR; INTRASYNOVIAL; SOFT TISSUE at 11:09

## 2021-09-07 RX ADMIN — BUPIVACAINE HYDROCHLORIDE 5 ML: 2.5 INJECTION, SOLUTION EPIDURAL; INFILTRATION; INTRACAUDAL; PERINEURAL at 11:09

## 2021-09-07 RX ADMIN — METHYLPREDNISOLONE ACETATE 40 MG: 40 INJECTION, SUSPENSION INTRA-ARTICULAR; INTRALESIONAL; INTRAMUSCULAR; INTRASYNOVIAL; SOFT TISSUE at 11:10

## 2021-09-07 NOTE — PROCEDURES
Procedures     Back/neck pain. Began years ago, 10/10 at worst, 5/10 at best, always present, varies, radiates to BUE and b/l hips, aching burning, stabbing, worse with all movement, interferes with ADLs, work, failed chiropractor, massage, course of PT in July 2017 and March 2018. Saw Dr. Meeks, as above, referred for cervical ESIs, also nonsurgical. MRI C-spine with multilevel DDD, DJD. Seen by Dr. Thompson in 2016. Denies anticoagulation, allergy to iodine or contrast, current infection or ABX. Had cervical TYRA with resolution of pain and numbness into BUE, then LESI x 2 with improvement. Rereferred for b/l L1-3 MBB and RFA by Dr. Meeks, had left then right RFA with tremendous relief. New severe muscle spasms in back, failed Baclofen 10mg BID, now 10mg TID. New pain in lower LBP improves with lumbar flexion, told her L TIFFANI is intact.      Had 1st cervical TYRA, then 2 ILESI.  Received MRI L-spine from [facility], appropriate for LESIs.  Performed 2 b/l L1-3 MBB per Dr. Meeks, 100% relief with both.  Performed left then right L1-3 RFA. Tremendous relief, still helping.  Performed 2nd b/l L5/S1 MBB for diagnostic purposes,   Performed left L5/S1 RFA, performed on right,   Perform b/l SIJ injection.  Increased to Baclofen 20mg TID. Could no longer get Robaxin.  Began Lidoderm q12h prn.  Discussed repeat LESIs vs TFESIs, she declines for now.  Began Medrol Dose Pack.  Reviewed MRI L-spine results with patient.  RTC in 3 months for f/u.      Sacroiliac Joint Injection    PREOPERATIVE DIAGNOSIS: Sacroilitis    POSTOPERATIVE DIAGNOSIS: Sacroilitis    PROCEDURE PERFORMED:  BILATERAL SACROILIAC JOINT INJECTION    The patient understands the risks and benefits of the procedure and wishes to proceed. The patient was seen in the preoperative area. Patient's consent was obtained and updated. Vitals were taken. Patient was then brought to the procedure suite and placed in prone position for a sacroiliac joint injection. The  appropriate anatomic area was widely prepped with Chloraprep and draped in a sterile fashion. Under fluoroscopic guidance using a contralateral oblique view, a 25 gauge curved tip spinal needle was passed through skin anesthetized with 1% Lidocaine without epinephrine. The needle tip was guided to the lower pole of the joint using fluoroscopy. 1mL of preservative free contrast was injected into the joint to confirm location. A clear outline was obtained and 3 mL of steroid solution containing 2 mL 0.25% bupivacaine, and 1mL 40mg Depomedrol was injected in total.  The procedure was repeated in all respects on the opposite side. The patient tolerated with no piper-procedural complications.  A sterile dressing was placed over the puncture sites.

## 2021-09-08 ENCOUNTER — TELEPHONE (OUTPATIENT)
Dept: PAIN MEDICINE | Facility: HOSPITAL | Age: 66
End: 2021-09-08

## 2021-09-08 NOTE — TELEPHONE ENCOUNTER
Post op procedure call made, spoke with patient. She reports doing well with no questions or concerns.

## 2021-09-30 ENCOUNTER — APPOINTMENT (OUTPATIENT)
Dept: MRI IMAGING | Facility: HOSPITAL | Age: 66
End: 2021-09-30

## 2021-09-30 ENCOUNTER — OFFICE VISIT (OUTPATIENT)
Dept: PAIN MEDICINE | Facility: CLINIC | Age: 66
End: 2021-09-30

## 2021-09-30 ENCOUNTER — APPOINTMENT (OUTPATIENT)
Dept: CARDIOLOGY | Facility: HOSPITAL | Age: 66
End: 2021-09-30

## 2021-09-30 ENCOUNTER — HOSPITAL ENCOUNTER (EMERGENCY)
Facility: HOSPITAL | Age: 66
Discharge: HOME | End: 2021-09-30
Attending: EMERGENCY MEDICINE | Admitting: EMERGENCY MEDICINE

## 2021-09-30 VITALS
WEIGHT: 190 LBS | RESPIRATION RATE: 16 BRPM | HEART RATE: 78 BPM | DIASTOLIC BLOOD PRESSURE: 79 MMHG | OXYGEN SATURATION: 98 % | HEIGHT: 67 IN | BODY MASS INDEX: 29.82 KG/M2 | SYSTOLIC BLOOD PRESSURE: 145 MMHG

## 2021-09-30 VITALS
OXYGEN SATURATION: 96 % | DIASTOLIC BLOOD PRESSURE: 61 MMHG | WEIGHT: 211 LBS | TEMPERATURE: 98.1 F | RESPIRATION RATE: 16 BRPM | BODY MASS INDEX: 33.12 KG/M2 | HEIGHT: 67 IN | HEART RATE: 76 BPM | SYSTOLIC BLOOD PRESSURE: 115 MMHG

## 2021-09-30 DIAGNOSIS — Z79.899 OTHER LONG TERM (CURRENT) DRUG THERAPY: ICD-10-CM

## 2021-09-30 DIAGNOSIS — M51.36 DEGENERATION OF INTERVERTEBRAL DISC OF LUMBAR REGION: ICD-10-CM

## 2021-09-30 DIAGNOSIS — M47.817 OSTEOARTHRITIS OF LUMBOSACRAL SPINE WITHOUT MYELOPATHY: ICD-10-CM

## 2021-09-30 DIAGNOSIS — M50.10 CERVICAL DISC DISORDER WITH RADICULOPATHY: ICD-10-CM

## 2021-09-30 DIAGNOSIS — M54.16 LUMBAR RADICULITIS: ICD-10-CM

## 2021-09-30 DIAGNOSIS — M54.17 LUMBOSACRAL RADICULOPATHY: ICD-10-CM

## 2021-09-30 DIAGNOSIS — M46.1 SACROILIITIS (HCC): ICD-10-CM

## 2021-09-30 DIAGNOSIS — M70.61 GREATER TROCHANTERIC BURSITIS, RIGHT: ICD-10-CM

## 2021-09-30 DIAGNOSIS — M79.7 FIBROMYOSITIS: ICD-10-CM

## 2021-09-30 DIAGNOSIS — M54.40 CHRONIC BILATERAL LOW BACK PAIN WITH SCIATICA, SCIATICA LATERALITY UNSPECIFIED: Primary | ICD-10-CM

## 2021-09-30 DIAGNOSIS — M25.552 HIP PAIN, LEFT: ICD-10-CM

## 2021-09-30 DIAGNOSIS — M54.2 NECK PAIN: ICD-10-CM

## 2021-09-30 DIAGNOSIS — M54.42 LEFT-SIDED LOW BACK PAIN WITH LEFT-SIDED SCIATICA, UNSPECIFIED CHRONICITY: Primary | ICD-10-CM

## 2021-09-30 DIAGNOSIS — G89.29 CHRONIC BILATERAL LOW BACK PAIN WITH SCIATICA, SCIATICA LATERALITY UNSPECIFIED: Primary | ICD-10-CM

## 2021-09-30 LAB
ALBUMIN SERPL-MCNC: 3.9 G/DL (ref 3.5–5.2)
ALBUMIN/GLOB SERPL: 1.7 G/DL
ALP SERPL-CCNC: 151 U/L (ref 39–117)
ALT SERPL W P-5'-P-CCNC: 31 U/L (ref 1–33)
ANION GAP SERPL CALCULATED.3IONS-SCNC: 10 MMOL/L (ref 5–15)
AST SERPL-CCNC: 27 U/L (ref 1–32)
BACTERIA UR QL AUTO: ABNORMAL /HPF
BASOPHILS # BLD AUTO: 0 10*3/MM3 (ref 0–0.2)
BASOPHILS NFR BLD AUTO: 0.7 % (ref 0–1.5)
BH CV LOW VAS LEFT GASTRONEMIUS VESSEL: 1
BH CV LOWER VASCULAR LEFT COMMON FEMORAL AUGMENT: NORMAL
BH CV LOWER VASCULAR LEFT COMMON FEMORAL COMPETENT: NORMAL
BH CV LOWER VASCULAR LEFT COMMON FEMORAL COMPRESS: NORMAL
BH CV LOWER VASCULAR LEFT COMMON FEMORAL PHASIC: NORMAL
BH CV LOWER VASCULAR LEFT COMMON FEMORAL SPONT: NORMAL
BH CV LOWER VASCULAR LEFT DISTAL FEMORAL COMPRESS: NORMAL
BH CV LOWER VASCULAR LEFT GASTRONEMIUS COMPRESS: NORMAL
BH CV LOWER VASCULAR LEFT GASTRONEMIUS THROMBUS: NORMAL
BH CV LOWER VASCULAR LEFT GREATER SAPH AK COMPRESS: NORMAL
BH CV LOWER VASCULAR LEFT GREATER SAPH BK COMPRESS: NORMAL
BH CV LOWER VASCULAR LEFT LESSER SAPH COMPRESS: NORMAL
BH CV LOWER VASCULAR LEFT MID FEMORAL AUGMENT: NORMAL
BH CV LOWER VASCULAR LEFT MID FEMORAL COMPETENT: NORMAL
BH CV LOWER VASCULAR LEFT MID FEMORAL COMPRESS: NORMAL
BH CV LOWER VASCULAR LEFT MID FEMORAL PHASIC: NORMAL
BH CV LOWER VASCULAR LEFT MID FEMORAL SPONT: NORMAL
BH CV LOWER VASCULAR LEFT PERONEAL COMPRESS: NORMAL
BH CV LOWER VASCULAR LEFT POPLITEAL AUGMENT: NORMAL
BH CV LOWER VASCULAR LEFT POPLITEAL COMPETENT: NORMAL
BH CV LOWER VASCULAR LEFT POPLITEAL COMPRESS: NORMAL
BH CV LOWER VASCULAR LEFT POPLITEAL PHASIC: NORMAL
BH CV LOWER VASCULAR LEFT POPLITEAL SPONT: NORMAL
BH CV LOWER VASCULAR LEFT POSTERIOR TIBIAL COMPRESS: NORMAL
BH CV LOWER VASCULAR LEFT PROXIMAL FEMORAL COMPRESS: NORMAL
BH CV LOWER VASCULAR LEFT SAPHENOFEMORAL JUNCTION COMPRESS: NORMAL
BH CV LOWER VASCULAR RIGHT COMMON FEMORAL AUGMENT: NORMAL
BH CV LOWER VASCULAR RIGHT COMMON FEMORAL COMPETENT: NORMAL
BH CV LOWER VASCULAR RIGHT COMMON FEMORAL COMPRESS: NORMAL
BH CV LOWER VASCULAR RIGHT COMMON FEMORAL PHASIC: NORMAL
BH CV LOWER VASCULAR RIGHT COMMON FEMORAL SPONT: NORMAL
BILIRUB SERPL-MCNC: 0.3 MG/DL (ref 0–1.2)
BILIRUB UR QL STRIP: NEGATIVE
BUN SERPL-MCNC: 14 MG/DL (ref 8–23)
BUN/CREAT SERPL: 14.3 (ref 7–25)
CALCIUM SPEC-SCNC: 8.3 MG/DL (ref 8.6–10.5)
CHLORIDE SERPL-SCNC: 108 MMOL/L (ref 98–107)
CLARITY UR: CLEAR
CO2 SERPL-SCNC: 24 MMOL/L (ref 22–29)
COLOR UR: YELLOW
CREAT SERPL-MCNC: 0.98 MG/DL (ref 0.57–1)
DEPRECATED RDW RBC AUTO: 49.4 FL (ref 37–54)
EOSINOPHIL # BLD AUTO: 0.2 10*3/MM3 (ref 0–0.4)
EOSINOPHIL NFR BLD AUTO: 4.1 % (ref 0.3–6.2)
ERYTHROCYTE [DISTWIDTH] IN BLOOD BY AUTOMATED COUNT: 15.1 % (ref 12.3–15.4)
GFR SERPL CREATININE-BSD FRML MDRD: 57 ML/MIN/1.73
GLOBULIN UR ELPH-MCNC: 2.3 GM/DL
GLUCOSE SERPL-MCNC: 96 MG/DL (ref 65–99)
GLUCOSE UR STRIP-MCNC: NEGATIVE MG/DL
HCT VFR BLD AUTO: 35.8 % (ref 34–46.6)
HGB BLD-MCNC: 11.9 G/DL (ref 12–15.9)
HGB UR QL STRIP.AUTO: NEGATIVE
HYALINE CASTS UR QL AUTO: ABNORMAL /LPF
KETONES UR QL STRIP: NEGATIVE
LEUKOCYTE ESTERASE UR QL STRIP.AUTO: ABNORMAL
LYMPHOCYTES # BLD AUTO: 1.3 10*3/MM3 (ref 0.7–3.1)
LYMPHOCYTES NFR BLD AUTO: 25.2 % (ref 19.6–45.3)
MAXIMAL PREDICTED HEART RATE: 156 BPM
MCH RBC QN AUTO: 30.9 PG (ref 26.6–33)
MCHC RBC AUTO-ENTMCNC: 33.2 G/DL (ref 31.5–35.7)
MCV RBC AUTO: 92.9 FL (ref 79–97)
MONOCYTES # BLD AUTO: 0.3 10*3/MM3 (ref 0.1–0.9)
MONOCYTES NFR BLD AUTO: 6.6 % (ref 5–12)
NEUTROPHILS NFR BLD AUTO: 3.2 10*3/MM3 (ref 1.7–7)
NEUTROPHILS NFR BLD AUTO: 63.4 % (ref 42.7–76)
NITRITE UR QL STRIP: NEGATIVE
NRBC BLD AUTO-RTO: 0.2 /100 WBC (ref 0–0.2)
PH UR STRIP.AUTO: 5.5 [PH] (ref 5–8)
PLATELET # BLD AUTO: 172 10*3/MM3 (ref 140–450)
PMV BLD AUTO: 8.7 FL (ref 6–12)
POTASSIUM SERPL-SCNC: 4.7 MMOL/L (ref 3.5–5.2)
PROT SERPL-MCNC: 6.2 G/DL (ref 6–8.5)
PROT UR QL STRIP: NEGATIVE
RBC # BLD AUTO: 3.85 10*6/MM3 (ref 3.77–5.28)
RBC # UR: ABNORMAL /HPF
REF LAB TEST METHOD: ABNORMAL
SODIUM SERPL-SCNC: 142 MMOL/L (ref 136–145)
SP GR UR STRIP: 1.01 (ref 1–1.03)
SQUAMOUS #/AREA URNS HPF: ABNORMAL /HPF
STRESS TARGET HR: 133 BPM
UROBILINOGEN UR QL STRIP: ABNORMAL
WBC # BLD AUTO: 5 10*3/MM3 (ref 3.4–10.8)
WBC UR QL AUTO: ABNORMAL /HPF

## 2021-09-30 PROCEDURE — 99214 OFFICE O/P EST MOD 30 MIN: CPT | Performed by: PHYSICAL MEDICINE & REHABILITATION

## 2021-09-30 PROCEDURE — 80053 COMPREHEN METABOLIC PANEL: CPT | Performed by: EMERGENCY MEDICINE

## 2021-09-30 PROCEDURE — 96374 THER/PROPH/DIAG INJ IV PUSH: CPT

## 2021-09-30 PROCEDURE — 25010000002 ONDANSETRON PER 1 MG: Performed by: EMERGENCY MEDICINE

## 2021-09-30 PROCEDURE — 96375 TX/PRO/DX INJ NEW DRUG ADDON: CPT

## 2021-09-30 PROCEDURE — 25010000002 MORPHINE PER 10 MG: Performed by: EMERGENCY MEDICINE

## 2021-09-30 PROCEDURE — 85025 COMPLETE CBC W/AUTO DIFF WBC: CPT | Performed by: EMERGENCY MEDICINE

## 2021-09-30 PROCEDURE — 81001 URINALYSIS AUTO W/SCOPE: CPT | Performed by: EMERGENCY MEDICINE

## 2021-09-30 PROCEDURE — 93971 EXTREMITY STUDY: CPT

## 2021-09-30 PROCEDURE — 99283 EMERGENCY DEPT VISIT LOW MDM: CPT

## 2021-09-30 PROCEDURE — 96372 THER/PROPH/DIAG INJ SC/IM: CPT

## 2021-09-30 PROCEDURE — 72148 MRI LUMBAR SPINE W/O DYE: CPT

## 2021-09-30 PROCEDURE — 25010000002 METHYLPREDNISOLONE PER 80 MG: Performed by: EMERGENCY MEDICINE

## 2021-09-30 RX ORDER — METHYLPREDNISOLONE ACETATE 80 MG/ML
80 INJECTION, SUSPENSION INTRA-ARTICULAR; INTRALESIONAL; INTRAMUSCULAR; SOFT TISSUE ONCE
Status: COMPLETED | OUTPATIENT
Start: 2021-09-30 | End: 2021-09-30

## 2021-09-30 RX ORDER — MORPHINE SULFATE 4 MG/ML
4 INJECTION, SOLUTION INTRAMUSCULAR; INTRAVENOUS ONCE
Status: COMPLETED | OUTPATIENT
Start: 2021-09-30 | End: 2021-09-30

## 2021-09-30 RX ORDER — SODIUM CHLORIDE 0.9 % (FLUSH) 0.9 %
10 SYRINGE (ML) INJECTION AS NEEDED
Status: DISCONTINUED | OUTPATIENT
Start: 2021-09-30 | End: 2021-09-30 | Stop reason: HOSPADM

## 2021-09-30 RX ORDER — ONDANSETRON 2 MG/ML
4 INJECTION INTRAMUSCULAR; INTRAVENOUS ONCE
Status: COMPLETED | OUTPATIENT
Start: 2021-09-30 | End: 2021-09-30

## 2021-09-30 RX ADMIN — ONDANSETRON 4 MG: 2 INJECTION INTRAMUSCULAR; INTRAVENOUS at 12:37

## 2021-09-30 RX ADMIN — MORPHINE SULFATE 4 MG: 4 INJECTION INTRAVENOUS at 12:37

## 2021-09-30 RX ADMIN — METHYLPREDNISOLONE ACETATE 80 MG: 80 INJECTION, SUSPENSION INTRA-ARTICULAR; INTRALESIONAL; INTRAMUSCULAR; SOFT TISSUE at 17:13

## 2021-09-30 NOTE — PROGRESS NOTES
Subjective   Tahira Ramirez is a 66 y.o. female.     Back/neck pain. Began years ago, 10/10 at worst, 5/10 at best, always present, varies, radiates to BUE and b/l hips, aching burning, stabbing, worse with all movement, interferes with ADLs, work, failed chiropractor, massage, course of PT in July 2017 and March 2018. Saw Dr. Meeks, as above, referred for cervical ESIs, also nonsurgical. MRI C-spine with multilevel DDD, DJD. Seen by Dr. Thompson in 2016. Denies anticoagulation, allergy to iodine or contrast, current infection or ABX. Had cervical TYRA with resolution of pain and numbness into BUE, then LESI x 2 with improvement. Rereferred for b/l L1-3 MBB and RFA by Dr. Meeks, had left then right RFA with tremendous relief. New severe muscle spasms in back, failed Baclofen 10mg BID, now 10mg TID. New pain in lower LBP improves with lumbar flexion, told her L TIFFANI is intact. New numbness in LLE in different areas including her genitals, no perianal numbness, no b/b incontinence.       The following portions of the patient's history were reviewed and updated as appropriate: allergies, current medications, past family history, past medical history, past social history, past surgical history and problem list.    Review of Systems   Constitutional: Negative for chills, fatigue and fever.   HENT: Negative for hearing loss and trouble swallowing.    Eyes: Negative for visual disturbance.   Respiratory: Negative for shortness of breath.    Cardiovascular: Negative for chest pain.   Gastrointestinal: Negative for abdominal pain, constipation, diarrhea, nausea and vomiting.   Genitourinary: Negative for urinary incontinence.   Musculoskeletal: Positive for back pain and myalgias. Negative for arthralgias, joint swelling and neck pain.   Neurological: Negative for dizziness, weakness, numbness and headache.       Objective   Physical Exam   Constitutional: She is oriented to person, place, and time. She appears well-developed  and well-nourished.   HENT:   Head: Normocephalic and atraumatic.   Eyes: Pupils are equal, round, and reactive to light.   Cardiovascular: Normal rate, regular rhythm and normal heart sounds.   Pulmonary/Chest: Effort normal and breath sounds normal.   Abdominal: Soft. Normal appearance and bowel sounds are normal. She exhibits no distension. There is no abdominal tenderness.   Musculoskeletal:      Comments: (=) L NOMI and FADIR, mildly TTP b/l SIJ, strongly (+) b/l lumbar facet loading   Neurological: She is alert and oriented to person, place, and time. She has normal reflexes. She displays normal reflexes. No sensory deficit.   Normoreflexic, but 4/5 strength globally in LLE   Psychiatric: Her behavior is normal. Mood, judgment and thought content normal.         Assessment/Plan   Diagnoses and all orders for this visit:    1. Chronic bilateral low back pain with sciatica, sciatica laterality unspecified (Primary)    2. Neck pain    3. Cervical disc disorder with radiculopathy    4. Degeneration of intervertebral disc of lumbar region    5. Fibromyositis    6. Greater trochanteric bursitis, right    7. Hip pain, left    8. Lumbar radiculitis    9. Lumbosacral radiculopathy    10. Osteoarthritis of lumbosacral spine without myelopathy    11. Other long term (current) drug therapy    12. Sacroiliitis (CMS/HCC)        Had 1st cervical TYRA, then 2 ILESI.  Received MRI L-spine from [facility], appropriate for LESIs.  Performed 2 b/l L1-3 MBB per Dr. Meeks, 100% relief with both.  Performed left then right L1-3 RFA. Tremendous relief, still helping.  Performed 2 b/l L5/S1 MBB for diagnostic purposes,   Performed left L5/S1 RFA, performed on right,   Performed b/l SIJ injection.  Increased to Baclofen 20mg TID. Could no longer get Robaxin.  Began Lidoderm q12h prn.  Discussed repeat LESIs vs TFESIs, she declines for now.  Began Medrol Dose Pack.  Reviewed MRI L-spine results with patient.  Patient will present to ED  for work up including new MRI L-spine today, r/o cauda equina syndrome. May need LESIs pending results.  RTC in 1 month for f/u.

## 2021-10-28 ENCOUNTER — OFFICE VISIT (OUTPATIENT)
Dept: PAIN MEDICINE | Facility: CLINIC | Age: 66
End: 2021-10-28

## 2021-10-28 VITALS
SYSTOLIC BLOOD PRESSURE: 152 MMHG | DIASTOLIC BLOOD PRESSURE: 83 MMHG | RESPIRATION RATE: 16 BRPM | OXYGEN SATURATION: 95 % | WEIGHT: 201 LBS | HEART RATE: 81 BPM | BODY MASS INDEX: 31.55 KG/M2 | HEIGHT: 67 IN

## 2021-10-28 DIAGNOSIS — M54.2 NECK PAIN: ICD-10-CM

## 2021-10-28 DIAGNOSIS — M54.17 LUMBOSACRAL RADICULOPATHY: ICD-10-CM

## 2021-10-28 DIAGNOSIS — G89.29 CHRONIC BILATERAL LOW BACK PAIN WITH SCIATICA, SCIATICA LATERALITY UNSPECIFIED: Primary | ICD-10-CM

## 2021-10-28 DIAGNOSIS — M25.552 HIP PAIN, LEFT: ICD-10-CM

## 2021-10-28 DIAGNOSIS — M47.817 OSTEOARTHRITIS OF LUMBOSACRAL SPINE WITHOUT MYELOPATHY: ICD-10-CM

## 2021-10-28 DIAGNOSIS — M46.1 SACROILIITIS (HCC): ICD-10-CM

## 2021-10-28 DIAGNOSIS — M54.16 LUMBAR RADICULITIS: ICD-10-CM

## 2021-10-28 DIAGNOSIS — Z79.899 OTHER LONG TERM (CURRENT) DRUG THERAPY: ICD-10-CM

## 2021-10-28 DIAGNOSIS — M50.10 CERVICAL DISC DISORDER WITH RADICULOPATHY: ICD-10-CM

## 2021-10-28 DIAGNOSIS — M51.36 DEGENERATION OF INTERVERTEBRAL DISC OF LUMBAR REGION: ICD-10-CM

## 2021-10-28 DIAGNOSIS — M70.61 GREATER TROCHANTERIC BURSITIS, RIGHT: ICD-10-CM

## 2021-10-28 DIAGNOSIS — M54.40 CHRONIC BILATERAL LOW BACK PAIN WITH SCIATICA, SCIATICA LATERALITY UNSPECIFIED: Primary | ICD-10-CM

## 2021-10-28 DIAGNOSIS — Z79.899 HIGH RISK MEDICATION USE: Primary | ICD-10-CM

## 2021-10-28 DIAGNOSIS — M79.7 FIBROMYOSITIS: ICD-10-CM

## 2021-10-28 PROCEDURE — 99214 OFFICE O/P EST MOD 30 MIN: CPT | Performed by: PHYSICAL MEDICINE & REHABILITATION

## 2021-10-28 RX ORDER — DILTIAZEM HYDROCHLORIDE 120 MG/1
CAPSULE, COATED, EXTENDED RELEASE ORAL
COMMUNITY
Start: 2021-10-19

## 2021-10-28 RX ORDER — GABAPENTIN 800 MG/1
TABLET ORAL
COMMUNITY
Start: 2021-10-19 | End: 2021-12-16 | Stop reason: SDUPTHER

## 2021-10-28 RX ORDER — OXYCODONE AND ACETAMINOPHEN 10; 325 MG/1; MG/1
1 TABLET ORAL EVERY 6 HOURS PRN
Qty: 28 TABLET | Refills: 0 | Status: SHIPPED | OUTPATIENT
Start: 2021-10-28 | End: 2021-11-15 | Stop reason: SDUPTHER

## 2021-10-28 RX ORDER — PREGABALIN 150 MG/1
150 CAPSULE ORAL 2 TIMES DAILY
Qty: 60 CAPSULE | Refills: 0 | Status: SHIPPED | OUTPATIENT
Start: 2021-10-28 | End: 2022-05-12

## 2021-10-28 NOTE — PROGRESS NOTES
Subjective   Tahira Ramirez is a 66 y.o. female.     Back/neck pain. Began years ago, 10/10 at worst, 5/10 at best, always present, varies, radiates to BUE and b/l hips, aching burning, stabbing, worse with all movement, interferes with ADLs, work, failed chiropractor, massage, course of PT in July 2017 and March 2018. Saw Dr. Meeks, as above, referred for cervical ESIs, also nonsurgical. MRI C-spine with multilevel DDD, DJD. Seen by Dr. Thompson in 2016. Denies anticoagulation, allergy to iodine or contrast, current infection or ABX. Had cervical TYRA with resolution of pain and numbness into BUE, then LESI x 2 with improvement. Rereferred for b/l L1-3 MBB and RFA by Dr. Meeks, had left then right RFA with tremendous relief. New severe muscle spasms in back, failed Baclofen 10mg BID, now 10mg TID. New pain in lower LBP improves with lumbar flexion, told her L TIFFANI is intact. New numbness in LLE in different areas including her genitals, no perianal numbness, no b/b incontinence, emergent new MRI L-spine w/o signs of cauda equina syndrome, unchanged from June 2021. Failed Norco 10mg and Gabapentin 600mg TID with PCP, cannot tolerate Gabapentin 800mg TID, referred for pain management.       The following portions of the patient's history were reviewed and updated as appropriate: allergies, current medications, past family history, past medical history, past social history, past surgical history and problem list.    Review of Systems   Constitutional: Negative for chills, fatigue and fever.   HENT: Negative for hearing loss and trouble swallowing.    Eyes: Negative for visual disturbance.   Respiratory: Negative for shortness of breath.    Cardiovascular: Negative for chest pain.   Gastrointestinal: Negative for abdominal pain, constipation, diarrhea, nausea and vomiting.   Genitourinary: Negative for urinary incontinence.   Musculoskeletal: Positive for back pain and myalgias. Negative for arthralgias, joint swelling and  neck pain.   Neurological: Negative for dizziness, weakness, numbness and headache.       Objective   Physical Exam   Constitutional: She is oriented to person, place, and time. She appears well-developed and well-nourished.   HENT:   Head: Normocephalic and atraumatic.   Eyes: Pupils are equal, round, and reactive to light.   Cardiovascular: Normal rate, regular rhythm and normal heart sounds.   Pulmonary/Chest: Effort normal and breath sounds normal.   Abdominal: Soft. Normal appearance and bowel sounds are normal. She exhibits no distension. There is no abdominal tenderness.   Musculoskeletal:      Comments: (=) L NOMI and FADIR, mildly TTP b/l SIJ, strongly (+) b/l lumbar facet loading   Neurological: She is alert and oriented to person, place, and time. She has normal reflexes. She displays normal reflexes. No sensory deficit.   Normoreflexic, but 4/5 strength globally in LLE   Psychiatric: Her behavior is normal. Mood, judgment and thought content normal.         Assessment/Plan   Diagnoses and all orders for this visit:    1. Chronic bilateral low back pain with sciatica, sciatica laterality unspecified (Primary)    2. Neck pain    3. Cervical disc disorder with radiculopathy    4. Degeneration of intervertebral disc of lumbar region    5. Fibromyositis    6. Greater trochanteric bursitis, right    7. Hip pain, left    8. Lumbar radiculitis    9. Lumbosacral radiculopathy    10. Osteoarthritis of lumbosacral spine without myelopathy    11. Other long term (current) drug therapy    12. Sacroiliitis (HCC)        Had 1st cervical TYRA, then 2 ILESI.  Received MRI L[facility]spine from [facility], appropriate for [facility].  Performed 2 b/l L1-3 MBB per Dr. Meeks, 100% relief with both.  Performed left then right L1-3 RFA. Tremendous relief, still helping.  Performed 2 b/l L5/S1 MBB for diagnostic purposes,   Performed left L5/S1 RFA, performed on right,   Performed b/l SIJ injection.  Increased to Baclofen 20mg TID. Could  no longer get Robaxin.  Began Lidoderm q12h prn.  Discussed repeat LESIs vs TFESIs, she declines for now, is afraid of injections.  Began Medrol Dose Pack.  Reviewed MRI L-spine results with patient.  Patient presented to ED for work up including new MRI L-spine, r/o cauda equina syndrome, no change from June 2021, no signs c/w cauda equina syndrome.  Discussed risks and benefits of opioid treatment for chonic pain with patient, including expectations related to prescription requests, alternative modalities to opioids for managing pain, her treatment plan, risks of dependency and addiction, and safe storage practices for prescribed opioids, as well as proper and improper disposal of all medications.  Will obtain UDS today, pain contract today.  Inspect report reviewed, prior notes reviewed, consistent with patient's stated history.  Treatment plan will consist of continuing current medication as long as it remains effective and is necessary, while evaluating patient at each visit and determining if the medication can be lowered or discontinued, while also using nonopioid therapies to reduce reliance on opioids.  Begin Percocet 10mg QID prn, failed Norco 10mg QID prn.  Stop Gabapentin, begin Lyrica 150mg BID.  RTC week of 11/11 - 11/18 for f/u.    INSPECT REPORT     As part of the patient's treatment plan, I am prescribing controlled substances. The patient has been made aware of appropriate use of such medications, including potential risk of somnolence, limited ability to drive and/or work safely, and the potential for dependence or overdose. It has also bee made clear that these medications are for use by this patient only, without concomitant use of alcohol or other substances unless prescribed.      Patient has completed prescribing agreement detailing terms of continued prescribing of controlled substances, including monitoring INSPECT reports, urine drug screening, and pill counts if necessary. The patient is  aware that inappropriate use will results in cessation of prescribing such medications.     INSPECT report has been reviewed and scanned into the patient's chart.     As the clinician, I personally reviewed the INSPECT while the patient was in the office today.     History and physical exam exhibit continued safe and appropriate use of controlled substances.  RTC in 1 month for f/u.

## 2021-11-15 ENCOUNTER — TELEPHONE (OUTPATIENT)
Dept: PAIN MEDICINE | Facility: CLINIC | Age: 66
End: 2021-11-15

## 2021-11-15 DIAGNOSIS — G89.29 CHRONIC BILATERAL LOW BACK PAIN WITH SCIATICA, SCIATICA LATERALITY UNSPECIFIED: ICD-10-CM

## 2021-11-15 DIAGNOSIS — M54.40 CHRONIC BILATERAL LOW BACK PAIN WITH SCIATICA, SCIATICA LATERALITY UNSPECIFIED: ICD-10-CM

## 2021-11-15 RX ORDER — OXYCODONE AND ACETAMINOPHEN 10; 325 MG/1; MG/1
1 TABLET ORAL EVERY 6 HOURS PRN
Qty: 120 TABLET | Refills: 0 | Status: SHIPPED | OUTPATIENT
Start: 2021-11-15 | End: 2021-12-16 | Stop reason: SDUPTHER

## 2021-11-15 NOTE — TELEPHONE ENCOUNTER
Caller: JACOB TAVAREZ    Relationship: PATIENT  Best call back number:[phone]    Requested Prescriptions:   Requested Prescriptions     Pending Prescriptions Disp Refills   • oxyCODONE-acetaminophen (Percocet)  MG per tablet 28 tablet 0     Sig: Take 1 tablet by mouth Every 6 (Six) Hours As Needed for Moderate Pain .        Pharmacy where request should be sent:  [facility] ON FILE IS CORRECT    Additional details provided by patient: PATIENT HAD TO CANCELUPCOMING APPT DUE TO COVID EXPOSURE AND HAS R/S FOR 12.16.21 WHICH WAS FIRST AVAIL AFTER QUARANTINE PERIOE    Does the patient have less than a 3 day supply:  [x] Yes  [] No    Chantal Lakhani Rep   11/15/21 14:24 EST

## 2021-11-17 ENCOUNTER — TELEPHONE (OUTPATIENT)
Dept: PAIN MEDICINE | Facility: CLINIC | Age: 66
End: 2021-11-17

## 2021-11-17 NOTE — TELEPHONE ENCOUNTER
Provider: DR BENNETT    Caller: JACOB TAVAREZ    Relationship to Patient: SELF    Pharmacy: [id number]    Phone Number: [phone]    Reason for Call: PATIENT WENT TO GET MEDS AND THEY REQUIRED PRIOR AUTHORIZATION. PLEASE AUTHORIZE HER BRAYDEN

## 2021-11-18 ENCOUNTER — TELEPHONE (OUTPATIENT)
Dept: PAIN MEDICINE | Facility: CLINIC | Age: 66
End: 2021-11-18

## 2021-11-18 DIAGNOSIS — G89.29 CHRONIC BILATERAL LOW BACK PAIN WITH SCIATICA, SCIATICA LATERALITY UNSPECIFIED: Primary | ICD-10-CM

## 2021-11-18 DIAGNOSIS — M54.40 CHRONIC BILATERAL LOW BACK PAIN WITH SCIATICA, SCIATICA LATERALITY UNSPECIFIED: Primary | ICD-10-CM

## 2021-11-18 NOTE — TELEPHONE ENCOUNTER
PA for Oxycodone-Acetaminophen was denied. Spoke with MD Edmond and after she finished the 7 day supply we can resend to PA stating that she will be using this continuously.

## 2021-11-29 NOTE — TELEPHONE ENCOUNTER
11/29/21-- tried to call pt-no answer- mailbox is not set up either-- could not leave a voice message

## 2021-11-29 NOTE — TELEPHONE ENCOUNTER
Insurance still denied Oxycodone. She needs to have PT within the previous 2 years for 6 weeks of duration.

## 2021-12-09 ENCOUNTER — TREATMENT (OUTPATIENT)
Dept: PHYSICAL THERAPY | Facility: CLINIC | Age: 66
End: 2021-12-09

## 2021-12-09 DIAGNOSIS — R26.2 DIFFICULTY WALKING: ICD-10-CM

## 2021-12-09 DIAGNOSIS — M54.41 CHRONIC BILATERAL LOW BACK PAIN WITH BILATERAL SCIATICA: Primary | ICD-10-CM

## 2021-12-09 DIAGNOSIS — M54.42 CHRONIC BILATERAL LOW BACK PAIN WITH BILATERAL SCIATICA: Primary | ICD-10-CM

## 2021-12-09 DIAGNOSIS — G89.29 CHRONIC BILATERAL LOW BACK PAIN WITH BILATERAL SCIATICA: Primary | ICD-10-CM

## 2021-12-09 DIAGNOSIS — R53.1 WEAKNESS GENERALIZED: ICD-10-CM

## 2021-12-09 PROCEDURE — 97110 THERAPEUTIC EXERCISES: CPT | Performed by: PHYSICAL THERAPIST

## 2021-12-09 PROCEDURE — 97161 PT EVAL LOW COMPLEX 20 MIN: CPT | Performed by: PHYSICAL THERAPIST

## 2021-12-09 PROCEDURE — 97140 MANUAL THERAPY 1/> REGIONS: CPT | Performed by: PHYSICAL THERAPIST

## 2021-12-09 NOTE — PROGRESS NOTES
Physical Therapy Initial Evaluation and Plan of Care    Patient: Tahira Ramirez   : 1955  Diagnosis/ICD-10 Code:  Chronic bilateral low back pain with bilateral sciatica [M54.42, M54.41, G89.29]  Referring practitioner: Reece Arora MD  Date of Initial Visit: 2021  Today's Date: 2021  Patient seen for 1 sessions           Subjective Questionnaire: Oswestry: 70% impairment       Subjective Evaluation    History of Present Illness  Mechanism of injury: Pt reporting chronic low back pain which has been worsening over the past couple years. Reporting in September she had sciatica pretty bad and Dr. Arora gave her an injection for it, and it was better for about 2 days, and then she bent over while she was getting dog food at the store and had severe pain in the low back and eventually the left leg went numb. Groin area to foot was numb, and this has not gotten fully better. MRIs have been repeated and show no cauda equina syndrome.    MRI results as below-   IMPRESSION:  Moderate levoscoliosis superimposed multilevel degenerative changes disc  bulges bony hypertrophy detailed above causing varying degrees of neural  foraminal narrowing canal stenosis. Overall, no change since 2021    Current limitations: numbness in groin and in left side of low back, pain when walking, standing, sitting, hard to walk in general, can't stand up straight     PMHx: 2018 neck surgery, 2009 TKR (L),  abdominal mass removed on left side (ovaries removed with this),  hysterectomy,  gastric bypass     Quality of life: good    Pain  Current pain ratin  At best pain ratin  At worst pain ratin  Quality: throbbing, radiating and discomfort  Relieving factors: medications  Aggravating factors: movement, lifting, standing, stairs, prolonged positioning, ambulation and repetitive movement  Progression: worsening    Treatments  Previous treatment: physical therapy (years ago for her back  )  Patient Goals  Patient goals for therapy: decreased pain, increased motion, increased strength, independence with ADLs/IADLs and return to sport/leisure activities             Objective          Neurological Testing     Sensation     Lumbar   Left   Diminished: light touch    Right   Intact: light touch    Comments   Left light touch: diminished per pt on groin and left hip    Reflexes   Left   Patellar (L4): trace (1+)  Achilles (S1): trace (1+)    Right   Patellar (L4): trace (1+)  Achilles (S1): trace (1+)    Active Range of Motion     Lumbar   Flexion: 45 degrees   Extension: 0 degrees with pain  Left lateral flexion: 20 degrees   Right lateral flexion: 20 degrees   Left rotation: 15 degrees   Right rotation: 15 degrees     Additional Active Range of Motion Details  Static posture in standing - 15 deg forward flex at hips     Strength/Myotome Testing     Left Hip   Planes of Motion   Flexion: 4-    Right Hip   Planes of Motion   Flexion: 4-    Left Knee   Flexion: 4-  Extension: 4-    Right Knee   Flexion: 4-  Extension: 4-    Left Ankle/Foot   Dorsiflexion: 4-    Right Ankle/Foot   Dorsiflexion: 4-    Tests     Additional Tests Details  Limited objective testing d/t increased pain with all positions   Unable to lie flat to tolerate special testing   Pain with light palpation to L4-5, L5-S1 and quadratus (B)     No pain with posterior ilial rotation on (L)   No pain with long axis distraction on (L)           Assessment & Plan     Assessment  Impairments: abnormal gait, abnormal or restricted ROM, activity intolerance, impaired balance, impaired physical strength, lacks appropriate home exercise program and pain with function  Functional Limitations: carrying objects, lifting, sleeping, walking, pulling, uncomfortable because of pain, moving in bed, sitting, standing, stooping and unable to perform repetitive tasks  Assessment details: Pt is a 66 yr/o female presenting with chronic low back pain which has  been worsening to involve current numbness in groin and left hip area. Pt has had multiple MRIs recently to rule out red flags. Per VANESSA she reports 70% impairment. She has a complicated history of low back pain which is outlined in her most recent MRI and above in subjective portion. Her exam is limited as she was unable to tolerate most positions for special testing, however she did report decreased pain with posterior ilial rotation on (R) and long axis distraction on (R). Reviewed and issued initial HEP as well as results of evaluation, pt with good understanding. Recommend skilled OPPT to address above issues, pt in agreement.   Prognosis: good    Goals  Plan Goals: STG: to be met within 6 visits   1. Pt to be (I) with initial HEP  2. Pt to report pain level 7/10 at worst with daily activities   3. Pt to report ability to walk through grocery store with less than 3/10 increase in pain     LTG: to be met by DC   1. Pt to be (I) with finalized HEP   2. Pt to report decreased impairment per VANESSA to less than 40% impairment   3. Pt to report ability to walk/stand/perform housework for over 1 hour without needing a rest break with minimal increase in pain   4. Pt to show minimal gait impairment for community distances   5. Pt to report minimal numbness in groin/hip     Plan  Therapy options: will be seen for skilled therapy services  Planned modality interventions: cryotherapy, electrical stimulation/Russian stimulation, thermotherapy (hydrocollator packs), traction and ultrasound  Other planned modality interventions: dry needling   Planned therapy interventions: joint mobilization, home exercise program, functional ROM exercises, flexibility, body mechanics training, manual therapy, neuromuscular re-education, soft tissue mobilization, spinal/joint mobilization, strengthening, stretching and therapeutic activities  Other planned therapy interventions: aquatic therapy   Frequency: 2x week  Duration in visits:  20  Duration in weeks: 13  Treatment plan discussed with: patient        History # of Personal Factors and/or Comorbidities: MODERATE (1-2)  Examination of Body System(s): # of elements: LOW (1-2)  Clinical Presentation: STABLE   Clinical Decision Making: LOW     Timed:         Manual Therapy:    8     mins  73898;     Therapeutic Exercise:    15     mins  37222;     Neuromuscular Gabino:        mins  35273;    Therapeutic Activity:          mins  43824;     Gait Training:           mins  04484;     Ultrasound:          mins  29272;    Ionto                                   mins   07767  Self Care                            mins   58454  Canalith Repos         mins 88402      Un-Timed:  Electrical Stimulation:         mins  97170 ( );  Traction          mins 13587  Dry Needle                 ______ mins DRYNDL  Low Eval     30     Mins  91219  Mod Eval          Mins  09642  High Eval                            Mins  27669  Re-Eval                               mins  97143        Timed Treatment:   23   mins   Total Treatment:     55   mins    PT SIGNATURE: Nola Renteria, JENNIFER   DATE TREATMENT INITIATED: 12/9/2021    Initial Certification  Certification Period: 12/9/2021 through 3/9/2022  I certify that the therapy services are furnished while this patient is under my care.  The services outlined above are required by this patient, and will be reviewed every 90 days.     PHYSICIAN: Reece Arora MD      DATE:     Please sign and return via fax to [phone]. Thank you, [facility].

## 2021-12-16 ENCOUNTER — OFFICE VISIT (OUTPATIENT)
Dept: PAIN MEDICINE | Facility: CLINIC | Age: 66
End: 2021-12-16

## 2021-12-16 VITALS
RESPIRATION RATE: 16 BRPM | SYSTOLIC BLOOD PRESSURE: 195 MMHG | OXYGEN SATURATION: 97 % | HEIGHT: 67 IN | WEIGHT: 201 LBS | DIASTOLIC BLOOD PRESSURE: 86 MMHG | HEART RATE: 79 BPM | BODY MASS INDEX: 31.55 KG/M2

## 2021-12-16 DIAGNOSIS — M54.40 CHRONIC BILATERAL LOW BACK PAIN WITH SCIATICA, SCIATICA LATERALITY UNSPECIFIED: Primary | ICD-10-CM

## 2021-12-16 DIAGNOSIS — M47.817 OSTEOARTHRITIS OF LUMBOSACRAL SPINE WITHOUT MYELOPATHY: ICD-10-CM

## 2021-12-16 DIAGNOSIS — M79.7 FIBROMYOSITIS: ICD-10-CM

## 2021-12-16 DIAGNOSIS — M25.552 HIP PAIN, LEFT: ICD-10-CM

## 2021-12-16 DIAGNOSIS — M50.10 CERVICAL DISC DISORDER WITH RADICULOPATHY: ICD-10-CM

## 2021-12-16 DIAGNOSIS — M54.16 LUMBAR RADICULITIS: ICD-10-CM

## 2021-12-16 DIAGNOSIS — G89.29 CHRONIC BILATERAL LOW BACK PAIN WITH SCIATICA, SCIATICA LATERALITY UNSPECIFIED: Primary | ICD-10-CM

## 2021-12-16 DIAGNOSIS — M54.2 NECK PAIN: ICD-10-CM

## 2021-12-16 DIAGNOSIS — Z79.899 OTHER LONG TERM (CURRENT) DRUG THERAPY: ICD-10-CM

## 2021-12-16 DIAGNOSIS — M54.17 LUMBOSACRAL RADICULOPATHY: ICD-10-CM

## 2021-12-16 DIAGNOSIS — M46.1 SACROILIITIS (HCC): ICD-10-CM

## 2021-12-16 DIAGNOSIS — M70.61 GREATER TROCHANTERIC BURSITIS, RIGHT: ICD-10-CM

## 2021-12-16 DIAGNOSIS — M51.36 DEGENERATION OF INTERVERTEBRAL DISC OF LUMBAR REGION: ICD-10-CM

## 2021-12-16 PROCEDURE — 99214 OFFICE O/P EST MOD 30 MIN: CPT | Performed by: PHYSICAL MEDICINE & REHABILITATION

## 2021-12-16 RX ORDER — OXYCODONE AND ACETAMINOPHEN 10; 325 MG/1; MG/1
1 TABLET ORAL EVERY 6 HOURS PRN
Qty: 120 TABLET | Refills: 0 | Status: SHIPPED | OUTPATIENT
Start: 2021-12-16 | End: 2022-02-17 | Stop reason: SDUPTHER

## 2021-12-16 RX ORDER — GABAPENTIN 800 MG/1
800 TABLET ORAL 4 TIMES DAILY
Qty: 120 TABLET | Refills: 1 | Status: SHIPPED | OUTPATIENT
Start: 2021-12-16 | End: 2022-02-17 | Stop reason: SDUPTHER

## 2021-12-16 NOTE — PROGRESS NOTES
Subjective   Tahira Ramirez is a 66 y.o. female.     Back/neck pain. Began years ago, 10/10 at worst, 5/10 at best, always present, varies, radiates to BUE and b/l hips, aching burning, stabbing, worse with all movement, interferes with ADLs, work, failed chiropractor, massage, course of PT in July 2017 and March 2018. Saw Dr. Meeks, as above, referred for cervical ESIs, also nonsurgical. MRI C-spine with multilevel DDD, DJD. Seen by Dr. Thompson in 2016. Denies anticoagulation, allergy to iodine or contrast, current infection or ABX. Had cervical TYRA with resolution of pain and numbness into BUE, then LESI x 2 with improvement. Rereferred for b/l L1-3 MBB and RFA by Dr. Meeks, had left then right RFA with tremendous relief. New severe muscle spasms in back, failed Baclofen 10mg BID, now 10mg TID. New pain in lower LBP improves with lumbar flexion, told her L TIFFANI is intact. New numbness in LLE in different areas including her genitals, no perianal numbness, no b/b incontinence, emergent new MRI L-spine w/o signs of cauda equina syndrome, unchanged from June 2021. Failed Norco 10mg and Gabapentin 600mg TID with PCP, cannot tolerate Gabapentin 800mg TID, referred for pain management.       The following portions of the patient's history were reviewed and updated as appropriate: allergies, current medications, past family history, past medical history, past social history, past surgical history and problem list.    Review of Systems   Constitutional: Negative for chills, fatigue and fever.   HENT: Negative for hearing loss and trouble swallowing.    Eyes: Negative for visual disturbance.   Respiratory: Negative for shortness of breath.    Cardiovascular: Negative for chest pain.   Gastrointestinal: Negative for abdominal pain, constipation, diarrhea, nausea and vomiting.   Genitourinary: Negative for urinary incontinence.   Musculoskeletal: Positive for back pain and myalgias. Negative for arthralgias, joint swelling and  neck pain.   Neurological: Negative for dizziness, weakness, numbness and headache.       Objective   Physical Exam   Constitutional: She is oriented to person, place, and time. She appears well-developed and well-nourished.   HENT:   Head: Normocephalic and atraumatic.   Eyes: Pupils are equal, round, and reactive to light.   Cardiovascular: Normal rate, regular rhythm and normal heart sounds.   Pulmonary/Chest: Effort normal and breath sounds normal.   Abdominal: Soft. Normal appearance and bowel sounds are normal. She exhibits no distension. There is no abdominal tenderness.   Musculoskeletal:      Comments: (=) L NOMI and FADIR, mildly TTP b/l SIJ, strongly (+) b/l lumbar facet loading   Neurological: She is alert and oriented to person, place, and time. She has normal reflexes. She displays normal reflexes. No sensory deficit.   Normoreflexic, but 4/5 strength globally in LLE   Psychiatric: Her behavior is normal. Mood, judgment and thought content normal.         Assessment/Plan   Diagnoses and all orders for this visit:    1. Chronic bilateral low back pain with sciatica, sciatica laterality unspecified (Primary)    2. Neck pain    3. Cervical disc disorder with radiculopathy    4. Degeneration of intervertebral disc of lumbar region    5. Fibromyositis    6. Greater trochanteric bursitis, right    7. Hip pain, left    8. Lumbar radiculitis    9. Lumbosacral radiculopathy    10. Osteoarthritis of lumbosacral spine without myelopathy    11. Other long term (current) drug therapy    12. Sacroiliitis (HCC)        Had 1st cervical TYRA, then 2 ILESI.  Received MRI L[facility]spine from [facility], appropriate for [facility].  Performed 2 b/l L1-3 MBB per Dr. Meeks, 100% relief with both.  Performed left then right L1-3 RFA. Tremendous relief, still helping.  Performed 2 b/l L5/S1 MBB for diagnostic purposes,   Performed left L5/S1 RFA, performed on right,   Performed b/l SIJ injection.  Increased to Baclofen 20mg TID. Could  no longer get Robaxin.  Began Lidoderm q12h prn.  Discussed repeat LESIs vs TFESIs, she declines for now, is afraid of injections.  Began Medrol Dose Pack.  Reviewed MRI L-spine results with patient.  Patient presented to ED for work up including new MRI L-spine, r/o cauda equina syndrome, no change from June 2021, no signs c/w cauda equina syndrome.  Discussed risks and benefits of opioid treatment for chonic pain with patient, including expectations related to prescription requests, alternative modalities to opioids for managing pain, her treatment plan, risks of dependency and addiction, and safe storage practices for prescribed opioids, as well as proper and improper disposal of all medications.  UDS in order 10/28/21.  Treatment plan will consist of continuing current medication as long as it remains effective and is necessary, while evaluating patient at each visit and determining if the medication can be lowered or discontinued, while also using nonopioid therapies to reduce reliance on opioids.  Began Percocet 10mg QID prn, failed Norco 10mg QID prn, working well.  Stopped Gabapentin, insurance would not cover Lyrica 150mg BID, restart Gabapentin 800mg QID.  RTC 2 months for f/u.    INSPECT REPORT     As part of the patient's treatment plan, I am prescribing controlled substances. The patient has been made aware of appropriate use of such medications, including potential risk of somnolence, limited ability to drive and/or work safely, and the potential for dependence or overdose. It has also bee made clear that these medications are for use by this patient only, without concomitant use of alcohol or other substances unless prescribed.      Patient has completed prescribing agreement detailing terms of continued prescribing of controlled substances, including monitoring INSPECT reports, urine drug screening, and pill counts if necessary. The patient is aware that inappropriate use will results in cessation of  prescribing such medications.     INSPECT report has been reviewed and scanned into the patient's chart.     As the clinician, I personally reviewed the INSPECT while the patient was in the office today.     History and physical exam exhibit continued safe and appropriate use of controlled substances.

## 2021-12-21 ENCOUNTER — TELEPHONE (OUTPATIENT)
Dept: PHYSICAL THERAPY | Facility: CLINIC | Age: 66
End: 2021-12-21

## 2022-01-11 ENCOUNTER — TELEPHONE (OUTPATIENT)
Dept: ORTHOPEDIC SURGERY | Facility: CLINIC | Age: 67
End: 2022-01-11

## 2022-01-11 DIAGNOSIS — G89.29 CHRONIC BILATERAL LOW BACK PAIN WITH SCIATICA, SCIATICA LATERALITY UNSPECIFIED: Primary | ICD-10-CM

## 2022-01-11 DIAGNOSIS — M54.40 CHRONIC BILATERAL LOW BACK PAIN WITH SCIATICA, SCIATICA LATERALITY UNSPECIFIED: Primary | ICD-10-CM

## 2022-01-11 NOTE — TELEPHONE ENCOUNTER
Caller: Tahira Ramirez    Relationship to patient: Self    Best call back number: [phone]  Patient is needing: CALLBACK.  PATIENT HAS MISSED SEVERAL PHYSICAL THERAPY APPTS DUE TO LACK OF TRANSPORTATION AND NOW NEEDS NEW ORDER        UNABLE TO WARM TRANSFER

## 2022-01-18 ENCOUNTER — TELEPHONE (OUTPATIENT)
Dept: PAIN MEDICINE | Facility: CLINIC | Age: 67
End: 2022-01-18

## 2022-01-18 NOTE — TELEPHONE ENCOUNTER
OK, I ordered PT on 1/11/22, and it sounds like she will have to pay out of pocket until she does it unless she has been seeing a chiropractor on her own. Thanks.

## 2022-01-18 NOTE — TELEPHONE ENCOUNTER
Her insurance will not cover Oxycodone or any other short acting medication because she has not had any PT, TENS, etc.. in the past 2 years, we must supply notes with dates of therapy.

## 2022-01-25 ENCOUNTER — TREATMENT (OUTPATIENT)
Dept: PHYSICAL THERAPY | Facility: CLINIC | Age: 67
End: 2022-01-25

## 2022-01-25 DIAGNOSIS — R53.1 WEAKNESS GENERALIZED: ICD-10-CM

## 2022-01-25 DIAGNOSIS — M54.41 CHRONIC BILATERAL LOW BACK PAIN WITH BILATERAL SCIATICA: Primary | ICD-10-CM

## 2022-01-25 DIAGNOSIS — G89.29 CHRONIC BILATERAL LOW BACK PAIN WITH BILATERAL SCIATICA: Primary | ICD-10-CM

## 2022-01-25 DIAGNOSIS — M54.42 CHRONIC BILATERAL LOW BACK PAIN WITH BILATERAL SCIATICA: Primary | ICD-10-CM

## 2022-01-25 DIAGNOSIS — R26.2 DIFFICULTY WALKING: ICD-10-CM

## 2022-01-25 PROCEDURE — 97110 THERAPEUTIC EXERCISES: CPT | Performed by: PHYSICAL THERAPIST

## 2022-01-25 PROCEDURE — 97012 MECHANICAL TRACTION THERAPY: CPT | Performed by: PHYSICAL THERAPIST

## 2022-01-25 NOTE — PROGRESS NOTES
Physical Therapy Daily Progress Note and 30 day progress note     VISIT#: 2    Subjective   Tahira Ramirez reports: 9/10 pain this afternoon in the hip, numbness had gotten a little better after last time she was here but then she had a lot of transportation issues. Numbness has returned to previous level now.       Objective     See Exercise, Manual, and Modality Logs for complete treatment.   VANESSA: 33 points, 66% impairment   ROM: minimal availale motion in all lumbar areas   Patient Education: traction benefits     Assessment/Plan  Pt with minimal change in ROM and strength testing today, however did have improved symptoms during and following traction. Will assess benefit of mechanical traction at next session. Recommend continued skilled OPPT. Pt in agreement.     Plan Goals: STG: to be met within 6 visits   1. Pt to be (I) with initial HEP   2. Pt to report pain level 7/10 at worst with daily activities - not met   3. Pt to report ability to walk through grocery store with less than 3/10 increase in pain - not met     LTG: to be met by DC   1. Pt to be (I) with finalized HEP   2. Pt to report decreased impairment per VANESSA to less than 40% impairment   3. Pt to report ability to walk/stand/perform housework for over 1 hour without needing a rest break with minimal increase in pain - progressing (15 min)   4. Pt to show minimal gait impairment for community distances - not met   5. Pt to report minimal numbness in groin/hip - progressing     Progress per Plan of Care            Timed:         Manual Therapy:         mins  68040;     Therapeutic Exercise:    26     mins  56997;     Neuromuscular Gabino:        mins  00071;    Therapeutic Activity:          mins  02523;     Gait Training:           mins  18459;     Ultrasound:          mins  04198;    Ionto                                   mins   93765  Self Care                            mins   11702  [facility]                   mins   64212    Un-Timed:  Electrical Stimulation:         mins  70025 ( );  Traction     15     mins 83731  Dry Needle                 ______ mins DRYNDL  Low Eval          Mins  32907  Mod Eval          Mins  71189  High Eval                            Mins  86674  Re-Eval                               mins  82265    Timed Treatment:   26   mins   Total Treatment:     50   mins    Nola Renteria, PT    Physical Therapist

## 2022-02-01 ENCOUNTER — TELEPHONE (OUTPATIENT)
Dept: PHYSICAL THERAPY | Facility: CLINIC | Age: 67
End: 2022-02-01

## 2022-02-10 ENCOUNTER — TREATMENT (OUTPATIENT)
Dept: PHYSICAL THERAPY | Facility: CLINIC | Age: 67
End: 2022-02-10

## 2022-02-10 DIAGNOSIS — R26.2 DIFFICULTY WALKING: ICD-10-CM

## 2022-02-10 DIAGNOSIS — M54.42 CHRONIC BILATERAL LOW BACK PAIN WITH BILATERAL SCIATICA: Primary | ICD-10-CM

## 2022-02-10 DIAGNOSIS — M54.41 CHRONIC BILATERAL LOW BACK PAIN WITH BILATERAL SCIATICA: Primary | ICD-10-CM

## 2022-02-10 DIAGNOSIS — G89.29 CHRONIC BILATERAL LOW BACK PAIN WITH BILATERAL SCIATICA: Primary | ICD-10-CM

## 2022-02-10 DIAGNOSIS — R53.1 WEAKNESS GENERALIZED: ICD-10-CM

## 2022-02-10 PROCEDURE — 97530 THERAPEUTIC ACTIVITIES: CPT | Performed by: PHYSICAL THERAPIST

## 2022-02-10 PROCEDURE — 97012 MECHANICAL TRACTION THERAPY: CPT | Performed by: PHYSICAL THERAPIST

## 2022-02-10 PROCEDURE — 97110 THERAPEUTIC EXERCISES: CPT | Performed by: PHYSICAL THERAPIST

## 2022-02-10 NOTE — PROGRESS NOTES
Physical Therapy Daily Progress Note    VISIT#: 3    Subjective   Tahira Ramirez reports that she felt like the mechanical traction helped last session. She still has pain in her low back and has numbness into the groin and hip.   Pain Ratin    Objective     See Exercise, Manual, and Modality Logs for complete treatment.     Patient Education: traction parameters, exercise rationale/progressions    Assessment/Plan   Pt tolerated treatment well and was able to complete activities with min complaints of discomfort. Progressed mobility and strengthening exercises, ended session with traction for pain relief.     Progress per Plan of Care and Progress strengthening /stabilization /functional activity          Timed:         Manual Therapy:         mins  28533;     Therapeutic Exercise:    15     mins  80040;     Neuromuscular Gabino:        mins  64279;    Therapeutic Activity:     10     mins  87056;     Gait Training:           mins  95567;     Ultrasound:          mins  30122;    Ionto                                   mins   25682  Self Care                            mins   29774  Canalith Repos                   mins  80747    Un-Timed:  Electrical Stimulation:         mins  35259 ( );  Dry Needling          mins self-pay  Traction     15     mins 47681  Low Eval          Mins  80804  Mod Eval          Mins  63899  High Eval                            Mins  73687  Re-Eval                               mins  27566    Timed Treatment:   25   mins   Total Treatment:     40   mins          Any Churchill  Physical Therapist Assistant  [place] License # [id number]

## 2022-02-17 ENCOUNTER — OFFICE VISIT (OUTPATIENT)
Dept: PAIN MEDICINE | Facility: CLINIC | Age: 67
End: 2022-02-17

## 2022-02-17 VITALS
SYSTOLIC BLOOD PRESSURE: 151 MMHG | OXYGEN SATURATION: 96 % | HEART RATE: 90 BPM | DIASTOLIC BLOOD PRESSURE: 74 MMHG | WEIGHT: 201 LBS | HEIGHT: 67 IN | BODY MASS INDEX: 31.55 KG/M2 | RESPIRATION RATE: 16 BRPM

## 2022-02-17 DIAGNOSIS — G89.29 CHRONIC BILATERAL LOW BACK PAIN WITH SCIATICA, SCIATICA LATERALITY UNSPECIFIED: Primary | ICD-10-CM

## 2022-02-17 DIAGNOSIS — M47.817 OSTEOARTHRITIS OF LUMBOSACRAL SPINE WITHOUT MYELOPATHY: ICD-10-CM

## 2022-02-17 DIAGNOSIS — Z79.899 OTHER LONG TERM (CURRENT) DRUG THERAPY: ICD-10-CM

## 2022-02-17 DIAGNOSIS — M54.40 CHRONIC BILATERAL LOW BACK PAIN WITH SCIATICA, SCIATICA LATERALITY UNSPECIFIED: Primary | ICD-10-CM

## 2022-02-17 DIAGNOSIS — M51.36 DEGENERATION OF INTERVERTEBRAL DISC OF LUMBAR REGION: ICD-10-CM

## 2022-02-17 DIAGNOSIS — M70.61 GREATER TROCHANTERIC BURSITIS, RIGHT: ICD-10-CM

## 2022-02-17 DIAGNOSIS — M54.2 NECK PAIN: ICD-10-CM

## 2022-02-17 DIAGNOSIS — M46.1 SACROILIITIS: ICD-10-CM

## 2022-02-17 DIAGNOSIS — M54.16 LUMBAR RADICULITIS: ICD-10-CM

## 2022-02-17 DIAGNOSIS — M50.10 CERVICAL DISC DISORDER WITH RADICULOPATHY: ICD-10-CM

## 2022-02-17 DIAGNOSIS — M25.552 HIP PAIN, LEFT: ICD-10-CM

## 2022-02-17 DIAGNOSIS — M54.17 LUMBOSACRAL RADICULOPATHY: ICD-10-CM

## 2022-02-17 DIAGNOSIS — M79.7 FIBROMYOSITIS: ICD-10-CM

## 2022-02-17 PROCEDURE — 99213 OFFICE O/P EST LOW 20 MIN: CPT | Performed by: PHYSICAL MEDICINE & REHABILITATION

## 2022-02-17 RX ORDER — GABAPENTIN 800 MG/1
800 TABLET ORAL 4 TIMES DAILY
Qty: 120 TABLET | Refills: 5 | Status: SHIPPED | OUTPATIENT
Start: 2022-02-17 | End: 2022-03-17 | Stop reason: SDUPTHER

## 2022-02-17 RX ORDER — OXYCODONE AND ACETAMINOPHEN 10; 325 MG/1; MG/1
1 TABLET ORAL EVERY 6 HOURS PRN
Qty: 120 TABLET | Refills: 0 | Status: SHIPPED | OUTPATIENT
Start: 2022-02-17 | End: 2022-03-17

## 2022-02-17 RX ORDER — OXYCODONE AND ACETAMINOPHEN 10; 325 MG/1; MG/1
1 TABLET ORAL EVERY 6 HOURS PRN
Qty: 120 TABLET | Refills: 0 | Status: SHIPPED | OUTPATIENT
Start: 2022-02-17 | End: 2022-03-17 | Stop reason: SDUPTHER

## 2022-02-17 NOTE — PROGRESS NOTES
Subjective   Tahira Ramirez is a 66 y.o. female.     Back/neck pain. Began years ago, 10/10 at worst, 5/10 at best, always present, varies, radiates to BUE and b/l hips, aching burning, stabbing, worse with all movement, interferes with ADLs, work, failed chiropractor, massage, course of PT in July 2017 and March 2018. Saw Dr. Meeks, as above, referred for cervical ESIs, also nonsurgical. MRI C-spine with multilevel DDD, DJD. Seen by Dr. Thompson in 2016. Denies anticoagulation, allergy to iodine or contrast, current infection or ABX. Had cervical TYRA with resolution of pain and numbness into BUE, then LESI x 2 with improvement. Rereferred for b/l L1-3 MBB and RFA by Dr. Meeks, had left then right RFA with tremendous relief. New severe muscle spasms in back, failed Baclofen 10mg BID, now 10mg TID. New pain in lower LBP improves with lumbar flexion, told her L TIFFANI is intact. New numbness in LLE in different areas including her genitals, no perianal numbness, no b/b incontinence, emergent new MRI L-spine w/o signs of cauda equina syndrome, unchanged from June 2021. Failed Norco 10mg and Gabapentin 600mg TID with PCP, cannot tolerate Gabapentin 800mg TID, referred for pain management.       The following portions of the patient's history were reviewed and updated as appropriate: allergies, current medications, past family history, past medical history, past social history, past surgical history and problem list.    Review of Systems   Constitutional: Negative for chills, fatigue and fever.   HENT: Negative for hearing loss and trouble swallowing.    Eyes: Negative for visual disturbance.   Respiratory: Negative for shortness of breath.    Cardiovascular: Negative for chest pain.   Gastrointestinal: Negative for abdominal pain, constipation, diarrhea, nausea and vomiting.   Genitourinary: Negative for urinary incontinence.   Musculoskeletal: Positive for back pain and myalgias. Negative for arthralgias, joint swelling and  neck pain.   Neurological: Negative for dizziness, weakness, numbness and headache.       Objective   Physical Exam   Constitutional: She is oriented to person, place, and time. She appears well-developed and well-nourished.   HENT:   Head: Normocephalic and atraumatic.   Eyes: Pupils are equal, round, and reactive to light.   Cardiovascular: Normal rate, regular rhythm and normal heart sounds.   Pulmonary/Chest: Effort normal and breath sounds normal.   Abdominal: Soft. Normal appearance and bowel sounds are normal. She exhibits no distension. There is no abdominal tenderness.   Musculoskeletal:      Comments: (=) L NOMI and FADIR, mildly TTP b/l SIJ, strongly (+) b/l lumbar facet loading   Neurological: She is alert and oriented to person, place, and time. She has normal reflexes. She displays normal reflexes. No sensory deficit.   Normoreflexic, but 4/5 strength globally in LLE   Psychiatric: Her behavior is normal. Mood, judgment and thought content normal.         Assessment/Plan   Diagnoses and all orders for this visit:    1. Chronic bilateral low back pain with sciatica, sciatica laterality unspecified (Primary)    2. Neck pain    3. Osteoarthritis of lumbosacral spine without myelopathy    4. Other long term (current) drug therapy    5. Sacroiliitis (HCC)    6. Lumbosacral radiculopathy    7. Lumbar radiculitis    8. Hip pain, left    9. Greater trochanteric bursitis, right    10. Fibromyositis    11. Degeneration of intervertebral disc of lumbar region    12. Cervical disc disorder with radiculopathy      Received MRI L-spine from [facility], [facility] for [facility].  Had 1st cervical TYRA, then 2 ILESI.  Performed 2 b/l L1-3 MBB per Dr. Meeks, 100% relief with both.  Performed left then right L1-3 RFA. Tremendous relief, still helping.  Performed 2 b/l L5/S1 MBB for diagnostic purposes,   Performed left L5/S1 RFA, performed on right,   Performed b/l SIJ injection.  Increased to Baclofen 20mg TID. Could no  longer get Robaxin.  Began Lidoderm q12h prn.  Discussed repeat LESIs vs TFESIs, she declines for now, is afraid of injections.  Began Medrol Dose Pack.  Reviewed MRI L-spine results with patient.  Patient presented to ED for work up including new MRI L-spine, r/o cauda equina syndrome, no change from June 2021, no signs c/w cauda equina syndrome.  Discussed risks and benefits of opioid treatment for chonic pain with patient, including expectations related to prescription requests, alternative modalities to opioids for managing pain, her treatment plan, risks of dependency and addiction, and safe storage practices for prescribed opioids, as well as proper and improper disposal of all medications.  UDS in order 10/28/21.  Treatment plan will consist of continuing current medication as long as it remains effective and is necessary, while evaluating patient at each visit and determining if the medication can be lowered or discontinued, while also using nonopioid therapies to reduce reliance on opioids.  Began Percocet 10mg QID prn, failed Norco 10mg QID prn, working well.  Stopped Gabapentin, insurance would not cover Lyrica 150mg BID, restarted Gabapentin 800mg QID.  RTC 3 months for f/u.    INSPECT REPORT     As part of the patient's treatment plan, I am prescribing controlled substances. The patient has been made aware of appropriate use of such medications, including potential risk of somnolence, limited ability to drive and/or work safely, and the potential for dependence or overdose. It has also bee made clear that these medications are for use by this patient only, without concomitant use of alcohol or other substances unless prescribed.      Patient has completed prescribing agreement detailing terms of continued prescribing of controlled substances, including monitoring INSPECT reports, urine drug screening, and pill counts if necessary. The patient is aware that inappropriate use will results in cessation of  prescribing such medications.     INSPECT report has been reviewed and scanned into the patient's chart.     As the clinician, I personally reviewed the INSPECT while the patient was in the office today.     History and physical exam exhibit continued safe and appropriate use of controlled substances.      ADD: Percocet denied, needs to fail 1) non-drug treatment such as PT, massage, SCS for 6 weeks within past 2 years, and 2) two non-opioids meds within past year such as baclofen and gabapentin, which she has. Will order PT.

## 2022-02-18 ENCOUNTER — TREATMENT (OUTPATIENT)
Dept: PHYSICAL THERAPY | Facility: CLINIC | Age: 67
End: 2022-02-18

## 2022-02-18 DIAGNOSIS — R26.2 DIFFICULTY WALKING: ICD-10-CM

## 2022-02-18 DIAGNOSIS — M54.41 CHRONIC BILATERAL LOW BACK PAIN WITH BILATERAL SCIATICA: Primary | ICD-10-CM

## 2022-02-18 DIAGNOSIS — R53.1 WEAKNESS GENERALIZED: ICD-10-CM

## 2022-02-18 DIAGNOSIS — M54.42 CHRONIC BILATERAL LOW BACK PAIN WITH BILATERAL SCIATICA: Primary | ICD-10-CM

## 2022-02-18 DIAGNOSIS — G89.29 CHRONIC BILATERAL LOW BACK PAIN WITH BILATERAL SCIATICA: Primary | ICD-10-CM

## 2022-02-18 PROCEDURE — 97014 ELECTRIC STIMULATION THERAPY: CPT | Performed by: PHYSICAL THERAPIST

## 2022-02-18 PROCEDURE — 97012 MECHANICAL TRACTION THERAPY: CPT | Performed by: PHYSICAL THERAPIST

## 2022-02-18 PROCEDURE — 97530 THERAPEUTIC ACTIVITIES: CPT | Performed by: PHYSICAL THERAPIST

## 2022-02-18 PROCEDURE — 97110 THERAPEUTIC EXERCISES: CPT | Performed by: PHYSICAL THERAPIST

## 2022-02-18 NOTE — PROGRESS NOTES
Physical Therapy Daily Progress Note    VISIT#: 4    Subjective   Tahira Ramirez reports: Back is worse today, about 7/10. Made it worse while she was trying to tend to her houseplants. Wants to go back down on weight for the traction today.    Objective     See Exercise, Manual, and Modality Logs for complete treatment.     Patient Education:  Pain relief with IFC     Assessment/Plan  Included IFC with heat for pain relief today after traction d/t increased level of overall pain. Pt with improved levels of discomfort at end of session to 1/10. Continued with standing ex for hip strength as well with no issues, however pt did c/o fatigue in low back and legs.     Progress per Plan of Care        Timed:         Manual Therapy:         mins  09298;     Therapeutic Exercise:    15     mins  41454;     Neuromuscular Gabino:        mins  43881;    Therapeutic Activity:      9   mins  41822;     Gait Training:           mins  40588;     Ultrasound:          mins  70816;    Ionto                                   mins   67238  Self Care                            mins   32797  Canalith Repos                   mins  20594    Un-Timed:  Electrical Stimulation:    15     mins  97037 ( );  Traction     15     mins 59809  Dry Needle                 ______ mins DRYNDL  Low Eval          Mins  47615  Mod Eval          Mins  10699  High Eval                            Mins  40049  Re-Eval                               mins  09722    Timed Treatment:   24   mins   Total Treatment:     75   mins    Nola Renteria PT    Physical Therapist

## 2022-02-22 DIAGNOSIS — M54.40 CHRONIC BILATERAL LOW BACK PAIN WITH SCIATICA, SCIATICA LATERALITY UNSPECIFIED: Primary | ICD-10-CM

## 2022-02-22 DIAGNOSIS — G89.29 CHRONIC BILATERAL LOW BACK PAIN WITH SCIATICA, SCIATICA LATERALITY UNSPECIFIED: Primary | ICD-10-CM

## 2022-02-25 ENCOUNTER — TREATMENT (OUTPATIENT)
Dept: PHYSICAL THERAPY | Facility: CLINIC | Age: 67
End: 2022-02-25

## 2022-02-25 DIAGNOSIS — M54.42 CHRONIC BILATERAL LOW BACK PAIN WITH BILATERAL SCIATICA: Primary | ICD-10-CM

## 2022-02-25 DIAGNOSIS — R53.1 WEAKNESS GENERALIZED: ICD-10-CM

## 2022-02-25 DIAGNOSIS — G89.29 CHRONIC BILATERAL LOW BACK PAIN WITH SCIATICA, SCIATICA LATERALITY UNSPECIFIED: ICD-10-CM

## 2022-02-25 DIAGNOSIS — R26.2 DIFFICULTY WALKING: ICD-10-CM

## 2022-02-25 DIAGNOSIS — M54.40 CHRONIC BILATERAL LOW BACK PAIN WITH SCIATICA, SCIATICA LATERALITY UNSPECIFIED: ICD-10-CM

## 2022-02-25 DIAGNOSIS — M54.41 CHRONIC BILATERAL LOW BACK PAIN WITH BILATERAL SCIATICA: Primary | ICD-10-CM

## 2022-02-25 DIAGNOSIS — G89.29 CHRONIC BILATERAL LOW BACK PAIN WITH BILATERAL SCIATICA: Primary | ICD-10-CM

## 2022-02-25 PROCEDURE — 97110 THERAPEUTIC EXERCISES: CPT | Performed by: PHYSICAL THERAPIST

## 2022-02-25 PROCEDURE — 97012 MECHANICAL TRACTION THERAPY: CPT | Performed by: PHYSICAL THERAPIST

## 2022-02-25 PROCEDURE — 97014 ELECTRIC STIMULATION THERAPY: CPT | Performed by: PHYSICAL THERAPIST

## 2022-02-25 NOTE — PROGRESS NOTES
Physical Therapy Daily Progress Note    VISIT#: 5    Subjective   Tahira Ramirez reports that her back is doing better after last session.   Pain Ratin    Objective     See Exercise, Manual, and Modality Logs for complete treatment.     Patient Education: pain improvements, treatment rationale    Assessment/Plan   Pt exhibits improvement of symptoms after traction and estim. Pt tolerated exercises well and was able to complete without pain. Plan to progress next session to tolerance.     Progress per Plan of Care and Progress strengthening /stabilization /functional activity          Timed:         Manual Therapy:         mins  09404;     Therapeutic Exercise:    15     mins  82916;     Neuromuscular Gabino:        mins  30112;    Therapeutic Activity:          mins  17272;     Gait Training:           mins  62340;     Ultrasound:          mins  21775;    Ionto                                   mins   99463  Self Care                            mins   28902  Canalith Repos                   mins  76030    Un-Timed:  Electrical Stimulation:    15     mins  83358 ( );  Dry Needling          mins self-pay  Traction     15     mins 69145  Low Eval          Mins  14703  Mod Eval          Mins  05588  High Eval                            Mins  75320  Re-Eval                               mins  89532    Timed Treatment:   15   mins   Total Treatment:     45    mins          Any Churchill  Physical Therapist Assistant  [place] License # [id number]

## 2022-03-10 ENCOUNTER — ON CAMPUS - OUTPATIENT (AMBULATORY)
Dept: URBAN - METROPOLITAN AREA HOSPITAL 2 | Facility: HOSPITAL | Age: 67
End: 2022-03-10

## 2022-03-10 VITALS
OXYGEN SATURATION: 96 % | DIASTOLIC BLOOD PRESSURE: 86 MMHG | SYSTOLIC BLOOD PRESSURE: 140 MMHG | HEART RATE: 68 BPM | RESPIRATION RATE: 20 BRPM | DIASTOLIC BLOOD PRESSURE: 61 MMHG | OXYGEN SATURATION: 95 % | SYSTOLIC BLOOD PRESSURE: 148 MMHG | DIASTOLIC BLOOD PRESSURE: 102 MMHG | DIASTOLIC BLOOD PRESSURE: 82 MMHG | RESPIRATION RATE: 16 BRPM | WEIGHT: 201 LBS | OXYGEN SATURATION: 99 % | HEIGHT: 67 IN | DIASTOLIC BLOOD PRESSURE: 51 MMHG | HEART RATE: 77 BPM | RESPIRATION RATE: 17 BRPM | SYSTOLIC BLOOD PRESSURE: 91 MMHG | HEART RATE: 78 BPM | HEART RATE: 74 BPM | SYSTOLIC BLOOD PRESSURE: 160 MMHG | SYSTOLIC BLOOD PRESSURE: 137 MMHG | TEMPERATURE: 97.5 F | RESPIRATION RATE: 15 BRPM | DIASTOLIC BLOOD PRESSURE: 87 MMHG | RESPIRATION RATE: 18 BRPM | OXYGEN SATURATION: 98 % | SYSTOLIC BLOOD PRESSURE: 154 MMHG | OXYGEN SATURATION: 94 % | OXYGEN SATURATION: 97 % | HEART RATE: 72 BPM

## 2022-03-10 DIAGNOSIS — Z98.84 BARIATRIC SURGERY STATUS: ICD-10-CM

## 2022-03-10 DIAGNOSIS — K22.2 ESOPHAGEAL OBSTRUCTION: ICD-10-CM

## 2022-03-10 DIAGNOSIS — R13.10 DYSPHAGIA, UNSPECIFIED: ICD-10-CM

## 2022-03-10 PROCEDURE — 43450 DILATE ESOPHAGUS 1/MULT PASS: CPT | Performed by: INTERNAL MEDICINE

## 2022-03-10 PROCEDURE — 43235 EGD DIAGNOSTIC BRUSH WASH: CPT | Performed by: INTERNAL MEDICINE

## 2022-03-15 ENCOUNTER — TELEPHONE (OUTPATIENT)
Dept: PAIN MEDICINE | Facility: CLINIC | Age: 67
End: 2022-03-15

## 2022-03-15 NOTE — TELEPHONE ENCOUNTER
Caller: JACOB TAVAREZ    Relationship to patient: SELF    Best call back number:     Patient is needing: THE PATIENT WOULD LIKE DR BENNETT TO KNOW THAT AFTER HER CARPAL TUNNEL SX YESTERDAY HER SURGEON, DR ROSALES AT [facility], PRESCRIBED HER 10 PILLS OF OXYCODONE 10MG IMMEDIATE RELIEF.    SHE WOULD ALSO LIKE HER PREFERRED PHARMACY CHANGED BECAUSE [facility] IS NO LONGER IN BUSINESS.  PHARMACY:   [facility]  [address] [place] [id number]  PHONE: [phone]

## 2022-03-17 ENCOUNTER — TELEPHONE (OUTPATIENT)
Dept: PAIN MEDICINE | Facility: CLINIC | Age: 67
End: 2022-03-17

## 2022-03-17 DIAGNOSIS — M54.40 CHRONIC BILATERAL LOW BACK PAIN WITH SCIATICA, SCIATICA LATERALITY UNSPECIFIED: ICD-10-CM

## 2022-03-17 DIAGNOSIS — G89.29 CHRONIC BILATERAL LOW BACK PAIN WITH SCIATICA, SCIATICA LATERALITY UNSPECIFIED: ICD-10-CM

## 2022-03-17 RX ORDER — OXYCODONE AND ACETAMINOPHEN 10; 325 MG/1; MG/1
1 TABLET ORAL EVERY 6 HOURS PRN
Qty: 28 TABLET | Refills: 0 | Status: SHIPPED | OUTPATIENT
Start: 2022-03-19 | End: 2022-03-24 | Stop reason: SDUPTHER

## 2022-03-17 RX ORDER — GABAPENTIN 800 MG/1
800 TABLET ORAL 4 TIMES DAILY
Qty: 120 TABLET | Refills: 0 | Status: SHIPPED | OUTPATIENT
Start: 2022-03-17 | End: 2022-06-06

## 2022-03-17 NOTE — TELEPHONE ENCOUNTER
Caller: JACOB  Relationship to Patient: SELF    Phone Number: [phone]  Reason for Call: PT CALLED STATING THAT [facility] BOUGHT OUT [facility] AND ADVISED PT THAT SHE WOULD  NEED TO CONTACT OFFICE HAVE THEM SEND NEW SCRIPTS OVER. PLEASE ADVISE AT ABOVE PHONE NUMBER. PT STATES THE ADDRESS FOR PHARMACY IS STILL THE SAME - [address]

## 2022-03-24 DIAGNOSIS — M54.40 CHRONIC BILATERAL LOW BACK PAIN WITH SCIATICA, SCIATICA LATERALITY UNSPECIFIED: ICD-10-CM

## 2022-03-24 DIAGNOSIS — G89.29 CHRONIC BILATERAL LOW BACK PAIN WITH SCIATICA, SCIATICA LATERALITY UNSPECIFIED: ICD-10-CM

## 2022-03-24 RX ORDER — OXYCODONE AND ACETAMINOPHEN 10; 325 MG/1; MG/1
1 TABLET ORAL EVERY 6 HOURS PRN
Qty: 120 TABLET | Refills: 0 | Status: SHIPPED | OUTPATIENT
Start: 2022-03-24 | End: 2022-03-31 | Stop reason: SDUPTHER

## 2022-03-24 RX ORDER — BACLOFEN 20 MG/1
20 TABLET ORAL 3 TIMES DAILY
Qty: 90 TABLET | Refills: 3 | Status: SHIPPED | OUTPATIENT
Start: 2022-03-24 | End: 2022-04-05 | Stop reason: SDUPTHER

## 2022-03-24 NOTE — TELEPHONE ENCOUNTER
Rx Refill Note  Requested Prescriptions     Pending Prescriptions Disp Refills   • baclofen (LIORESAL) 20 MG tablet 90 tablet 3     Sig: Take 1 tablet by mouth 3 (Three) Times a Day.   • oxyCODONE-acetaminophen (Percocet)  MG per tablet 84 tablet 0     Sig: Take 1 tablet by mouth Every 6 (Six) Hours As Needed for Moderate Pain  for up to 21 days.      Last office visit with prescribing clinician: 2/17/2022      Next office visit with prescribing clinician: 5/12/2022            Megan Stover MA  03/24/22, 09:37 EDT

## 2022-03-31 ENCOUNTER — TELEPHONE (OUTPATIENT)
Dept: PAIN MEDICINE | Facility: CLINIC | Age: 67
End: 2022-03-31

## 2022-03-31 DIAGNOSIS — M54.40 CHRONIC BILATERAL LOW BACK PAIN WITH SCIATICA, SCIATICA LATERALITY UNSPECIFIED: ICD-10-CM

## 2022-03-31 DIAGNOSIS — G89.29 CHRONIC BILATERAL LOW BACK PAIN WITH SCIATICA, SCIATICA LATERALITY UNSPECIFIED: ICD-10-CM

## 2022-03-31 RX ORDER — OXYCODONE AND ACETAMINOPHEN 10; 325 MG/1; MG/1
1 TABLET ORAL EVERY 6 HOURS PRN
Qty: 120 TABLET | Refills: 0 | Status: SHIPPED | OUTPATIENT
Start: 2022-03-31 | End: 2022-04-21

## 2022-03-31 NOTE — TELEPHONE ENCOUNTER
UNABLE TO WARM TRANSFER - THE PRESCRIPTION WAS ONLY FILLED FOR A WEEKS WORTH - PER RYAN DUE TO PREV WENT THROUGH [facility] - BEFORE CAN FILL WHOLE PRESCRIPTION NEEDS AN AUTH - PER PATIENT  - NOW HAS 2 DAYS WORTH LEFT - REQ CALL BACK TO DISCUSS - WAS ADVISED SOMEONE FROM PRACTICE WOULD CALL HER

## 2022-03-31 NOTE — TELEPHONE ENCOUNTER
[phone]  PLEASE CALL PT BACK SHE IS ASKING WHAT IS GOING ON AND TRYING TO GET SOME INFORMATION   UNABLE TO WARM TRANSFER

## 2022-03-31 NOTE — TELEPHONE ENCOUNTER
3/31- DR PEÑA- REDID PA TODAY BECAUSE THEY NEEDED  MORE NOTES ABOUT PT-- WE SENT  IN MORE OFFICE DOCUMENTS TODAY-- FYI--PLEASE SEND  IN THE REMAINDER AMOUNT  AFTER THEY FILLED ONLY 7 DAYS WORTH OF MEDS-- -- WAITING FOR PA APPROVAL

## 2022-04-05 ENCOUNTER — TELEPHONE (OUTPATIENT)
Dept: PAIN MEDICINE | Facility: CLINIC | Age: 67
End: 2022-04-05

## 2022-04-05 RX ORDER — BACLOFEN 20 MG/1
20 TABLET ORAL 3 TIMES DAILY
Qty: 90 TABLET | Refills: 3 | Status: SHIPPED | OUTPATIENT
Start: 2022-04-05 | End: 2022-08-01

## 2022-04-05 NOTE — TELEPHONE ENCOUNTER
DELETE AFTER REVIEWING: Send the encounter HIGH priority, If patient has less than a 3 day supply. If the patient will run out of medication over the weekend add that information to the additional details line. Send this encounter to the clinical pool.    Caller: JACOB    Relationship: SELF    Best call back number: [phone]    Requested Prescriptions:   Requested Prescriptions     Pending Prescriptions Disp Refills   • baclofen (LIORESAL) 20 MG tablet 90 tablet 3     Sig: Take 1 tablet by mouth 3 (Three) Times a Day.        Pharmacy where request should be sent:   Pharmacy:  [id number] [address] [address] [facility] [address] [address] - [phone]  - [phone]       Additional details provided by patient: PT CALLED STATING THAT GERARDO SOLD OUT TO [employer] AND WENT TO GET HER MEDICATION BUT ADVISED THAT NO REFILLS CARRIED OVER. PT STATES SHE HAS LESS THEN A 3 DAY SUPPLY    Does the patient have less than a 3 day supply:  [x] Yes  [] No    Chantal Lopez Rep   04/05/22 12:22 EDT         DELETE AFTER READING TO PATIENT: “Thank you for sharing this information with me. I will send a message to the clinical team. Please allow 48 hours for the clinical staff to follow up on this request.”

## 2022-04-05 NOTE — TELEPHONE ENCOUNTER
Rx Refill Note  Requested Prescriptions     Pending Prescriptions Disp Refills   • baclofen (LIORESAL) 20 MG tablet 90 tablet 3     Sig: Take 1 tablet by mouth 3 (Three) Times a Day.      Last office visit with prescribing clinician: 2/17/2022      Next office visit with prescribing clinician: 5/12/2022            Megan Stover MA  04/05/22, 14:05 EDT

## 2022-04-28 ENCOUNTER — TELEPHONE (OUTPATIENT)
Dept: PAIN MEDICINE | Facility: CLINIC | Age: 67
End: 2022-04-28

## 2022-04-28 RX ORDER — OXYCODONE AND ACETAMINOPHEN 10; 325 MG/1; MG/1
1 TABLET ORAL EVERY 6 HOURS PRN
Qty: 120 TABLET | Refills: 0 | Status: SHIPPED | OUTPATIENT
Start: 2022-04-28 | End: 2022-05-12 | Stop reason: SDUPTHER

## 2022-04-28 NOTE — TELEPHONE ENCOUNTER
Caller: JACOB    Relationship: SELF    Best call back number: [phone]    Requested Prescriptions: PERCOCET 10  Requested Prescriptions      No prescriptions requested or ordered in this encounter        Pharmacy where request should be sent:    Pharmacy:  [facility] #[id number] [address] [address] AT [address] [address] - [phone]  - [phone]     Additional details provided by patient:     Does the patient have less than a 3 day supply:  [x] Yes  [] No    Chantal Lopez Rep   04/28/22 08:55 EDT

## 2022-05-12 ENCOUNTER — OFFICE VISIT (OUTPATIENT)
Dept: PAIN MEDICINE | Facility: CLINIC | Age: 67
End: 2022-05-12

## 2022-05-12 VITALS
OXYGEN SATURATION: 95 % | WEIGHT: 201 LBS | DIASTOLIC BLOOD PRESSURE: 77 MMHG | SYSTOLIC BLOOD PRESSURE: 146 MMHG | RESPIRATION RATE: 16 BRPM | HEART RATE: 81 BPM | BODY MASS INDEX: 31.48 KG/M2

## 2022-05-12 DIAGNOSIS — M25.552 HIP PAIN, LEFT: ICD-10-CM

## 2022-05-12 DIAGNOSIS — M50.10 CERVICAL DISC DISORDER WITH RADICULOPATHY: ICD-10-CM

## 2022-05-12 DIAGNOSIS — Z79.899 OTHER LONG TERM (CURRENT) DRUG THERAPY: ICD-10-CM

## 2022-05-12 DIAGNOSIS — M47.817 OSTEOARTHRITIS OF LUMBOSACRAL SPINE WITHOUT MYELOPATHY: ICD-10-CM

## 2022-05-12 DIAGNOSIS — M79.7 FIBROMYOSITIS: ICD-10-CM

## 2022-05-12 DIAGNOSIS — M70.61 GREATER TROCHANTERIC BURSITIS, RIGHT: ICD-10-CM

## 2022-05-12 DIAGNOSIS — M54.2 NECK PAIN: ICD-10-CM

## 2022-05-12 DIAGNOSIS — M51.36 DEGENERATION OF INTERVERTEBRAL DISC OF LUMBAR REGION: ICD-10-CM

## 2022-05-12 DIAGNOSIS — M46.1 SACROILIITIS: ICD-10-CM

## 2022-05-12 DIAGNOSIS — G89.29 CHRONIC BILATERAL LOW BACK PAIN WITH SCIATICA, SCIATICA LATERALITY UNSPECIFIED: Primary | ICD-10-CM

## 2022-05-12 DIAGNOSIS — M54.40 CHRONIC BILATERAL LOW BACK PAIN WITH SCIATICA, SCIATICA LATERALITY UNSPECIFIED: Primary | ICD-10-CM

## 2022-05-12 DIAGNOSIS — M54.16 LUMBAR RADICULITIS: ICD-10-CM

## 2022-05-12 DIAGNOSIS — M54.17 LUMBOSACRAL RADICULOPATHY: ICD-10-CM

## 2022-05-12 PROCEDURE — 99213 OFFICE O/P EST LOW 20 MIN: CPT | Performed by: PHYSICAL MEDICINE & REHABILITATION

## 2022-05-12 RX ORDER — OXYCODONE AND ACETAMINOPHEN 10; 325 MG/1; MG/1
1 TABLET ORAL EVERY 6 HOURS PRN
Qty: 120 TABLET | Refills: 0 | Status: SHIPPED | OUTPATIENT
Start: 2022-05-12 | End: 2022-08-18 | Stop reason: SDUPTHER

## 2022-05-12 NOTE — PROGRESS NOTES
Subjective   Tahira Ramirez is a 66 y.o. female.     Back/neck pain. Began years ago, 10/10 at worst, 5/10 at best, always present, varies, radiates to BUE and b/l hips, aching burning, stabbing, worse with all movement, interferes with ADLs, work, failed chiropractor, massage, course of PT in July 2017 and March 2018. Saw Dr. Meeks, as above, referred for cervical ESIs, also nonsurgical. MRI C-spine with multilevel DDD, DJD. Seen by Dr. Thompson in 2016. Denies anticoagulation, allergy to iodine or contrast, current infection or ABX. Had cervical TYRA with resolution of pain and numbness into BUE, then LESI x 2 with improvement. Rereferred for b/l L1-3 MBB and RFA by Dr. Meeks, had left then right RFA with tremendous relief. New severe muscle spasms in back, failed Baclofen 10mg BID, now 10mg TID. New pain in lower LBP improves with lumbar flexion, told her L TIFFANI is intact. New numbness in LLE in different areas including her genitals, no perianal numbness, no b/b incontinence, emergent new MRI L-spine w/o signs of cauda equina syndrome, unchanged from June 2021. Failed Norco 10mg and Gabapentin 600mg TID with PCP, cannot tolerate Gabapentin 800mg TID, referred for pain management.       The following portions of the patient's history were reviewed and updated as appropriate: allergies, current medications, past family history, past medical history, past social history, past surgical history and problem list.    Review of Systems   Constitutional: Negative for chills, fatigue and fever.   HENT: Negative for hearing loss and trouble swallowing.    Eyes: Negative for visual disturbance.   Respiratory: Negative for shortness of breath.    Cardiovascular: Negative for chest pain.   Gastrointestinal: Negative for abdominal pain, constipation, diarrhea, nausea and vomiting.   Genitourinary: Negative for urinary incontinence.   Musculoskeletal: Positive for back pain and myalgias. Negative for arthralgias, joint swelling and  neck pain.   Neurological: Negative for dizziness, weakness, numbness and headache.       Objective   Physical Exam   Constitutional: She is oriented to person, place, and time. She appears well-developed and well-nourished.   HENT:   Head: Normocephalic and atraumatic.   Eyes: Pupils are equal, round, and reactive to light.   Cardiovascular: Normal rate, regular rhythm and normal heart sounds.   Pulmonary/Chest: Effort normal and breath sounds normal.   Abdominal: Soft. Normal appearance and bowel sounds are normal. She exhibits no distension. There is no abdominal tenderness.   Musculoskeletal:      Comments: (=) L NOMI and FADIR, mildly TTP b/l SIJ, strongly (+) b/l lumbar facet loading   Neurological: She is alert and oriented to person, place, and time. She has normal reflexes. She displays normal reflexes. No sensory deficit.   Normoreflexic, but 4/5 strength globally in LLE   Psychiatric: Her behavior is normal. Mood, judgment and thought content normal.         Assessment & Plan   Diagnoses and all orders for this visit:    1. Chronic bilateral low back pain with sciatica, sciatica laterality unspecified (Primary)    2. Neck pain    3. Cervical disc disorder with radiculopathy    4. Degeneration of intervertebral disc of lumbar region    5. Fibromyositis    6. Greater trochanteric bursitis, right    7. Hip pain, left    8. Lumbar radiculitis    9. Lumbosacral radiculopathy    10. Osteoarthritis of lumbosacral spine without myelopathy    11. Other long term (current) drug therapy    12. Sacroiliitis (HCC)      Received MRI L-spine from [facility], [facility].  Had 1st cervical TYRA, then 2 ILESI.  Performed 2 b/l L1-3 MBB per Dr. Meeks, 100% relief with both.  Performed left then right L1-3 RFA. Tremendous relief, still helping.  Performed 2 b/l L5/S1 MBB for diagnostic purposes,   Performed left L5/S1 RFA, performed on right,   Performed b/l SIJ injection.  Increased to Baclofen 20mg TID. Could  no longer get Robaxin.  Began Lidoderm q12h prn.  Discussed repeat LESIs vs TFESIs, she declines for now, is afraid of injections.  Began Medrol Dose Pack.  Reviewed MRI L-spine results with patient.  Patient presented to ED for work up including new MRI L-spine, r/o cauda equina syndrome, no change from June 2021, no signs c/w cauda equina syndrome.  Discussed risks and benefits of opioid treatment for chonic pain with patient, including expectations related to prescription requests, alternative modalities to opioids for managing pain, her treatment plan, risks of dependency and addiction, and safe storage practices for prescribed opioids, as well as proper and improper disposal of all medications.  UDS in order 10/28/21.  Treatment plan will consist of continuing current medication as long as it remains effective and is necessary, while evaluating patient at each visit and determining if the medication can be lowered or discontinued, while also using nonopioid therapies to reduce reliance on opioids.  Began Percocet 10mg QID prn, failed Norco 10mg QID prn, working well.  Stopped Gabapentin, insurance would not cover Lyrica 150mg BID, restarted Gabapentin 800mg QID.  RTC 3 months for f/u.    INSPECT REPORT     As part of the patient's treatment plan, I am prescribing controlled substances. The patient has been made aware of appropriate use of such medications, including potential risk of somnolence, limited ability to drive and/or work safely, and the potential for dependence or overdose. It has also bee made clear that these medications are for use by this patient only, without concomitant use of alcohol or other substances unless prescribed.      Patient has completed prescribing agreement detailing terms of continued prescribing of controlled substances, including monitoring INSPECT reports, urine drug screening, and pill counts if necessary. The patient is aware that inappropriate use will results in cessation  of prescribing such medications.     INSPECT report has been reviewed and scanned into the patient's chart.     As the clinician, I personally reviewed the INSPECT while the patient was in the office today.     History and physical exam exhibit continued safe and appropriate use of controlled substances.      ADD: Percocet denied, needs to fail 1) non-drug treatment such as PT, massage, SCS for 6 weeks within past 2 years, and 2) two non-opioids meds within past year such as baclofen and gabapentin, which she has. Ordered PT, pt has not started, prefers to just use GoodRx until she begins Medicare.

## 2022-06-06 DIAGNOSIS — G89.29 CHRONIC BILATERAL LOW BACK PAIN WITH SCIATICA, SCIATICA LATERALITY UNSPECIFIED: ICD-10-CM

## 2022-06-06 DIAGNOSIS — M54.40 CHRONIC BILATERAL LOW BACK PAIN WITH SCIATICA, SCIATICA LATERALITY UNSPECIFIED: ICD-10-CM

## 2022-06-06 RX ORDER — GABAPENTIN 800 MG/1
TABLET ORAL
Qty: 120 TABLET | Refills: 0 | Status: SHIPPED | OUTPATIENT
Start: 2022-06-06 | End: 2022-07-15 | Stop reason: SDUPTHER

## 2022-07-02 DIAGNOSIS — M54.40 CHRONIC BILATERAL LOW BACK PAIN WITH SCIATICA, SCIATICA LATERALITY UNSPECIFIED: ICD-10-CM

## 2022-07-02 DIAGNOSIS — G89.29 CHRONIC BILATERAL LOW BACK PAIN WITH SCIATICA, SCIATICA LATERALITY UNSPECIFIED: ICD-10-CM

## 2022-07-05 RX ORDER — GABAPENTIN 800 MG/1
TABLET ORAL
Qty: 120 TABLET | Refills: 0 | OUTPATIENT
Start: 2022-07-05

## 2022-07-14 DIAGNOSIS — M54.40 CHRONIC BILATERAL LOW BACK PAIN WITH SCIATICA, SCIATICA LATERALITY UNSPECIFIED: ICD-10-CM

## 2022-07-14 DIAGNOSIS — G89.29 CHRONIC BILATERAL LOW BACK PAIN WITH SCIATICA, SCIATICA LATERALITY UNSPECIFIED: ICD-10-CM

## 2022-07-14 NOTE — TELEPHONE ENCOUNTER
Caller: JACOB    Relationship to patient: SELF    Best callback number: [phone]    Patient is needing: PATIENT CALLING TO GET DILAUDID REFILLED AT [facility]

## 2022-07-15 RX ORDER — GABAPENTIN 800 MG/1
800 TABLET ORAL 4 TIMES DAILY
Qty: 120 TABLET | Refills: 0 | Status: SHIPPED | OUTPATIENT
Start: 2022-07-15 | End: 2022-08-01

## 2022-08-01 DIAGNOSIS — M54.40 CHRONIC BILATERAL LOW BACK PAIN WITH SCIATICA, SCIATICA LATERALITY UNSPECIFIED: ICD-10-CM

## 2022-08-01 DIAGNOSIS — G89.29 CHRONIC BILATERAL LOW BACK PAIN WITH SCIATICA, SCIATICA LATERALITY UNSPECIFIED: ICD-10-CM

## 2022-08-01 RX ORDER — BACLOFEN 20 MG/1
TABLET ORAL
Qty: 90 TABLET | Refills: 3 | Status: SHIPPED | OUTPATIENT
Start: 2022-08-01 | End: 2022-12-07

## 2022-08-01 RX ORDER — GABAPENTIN 800 MG/1
TABLET ORAL
Qty: 120 TABLET | Refills: 0 | Status: SHIPPED | OUTPATIENT
Start: 2022-08-01 | End: 2022-08-18 | Stop reason: SDUPTHER

## 2022-08-18 ENCOUNTER — OFFICE VISIT (OUTPATIENT)
Dept: PAIN MEDICINE | Facility: CLINIC | Age: 67
End: 2022-08-18

## 2022-08-18 VITALS
OXYGEN SATURATION: 98 % | SYSTOLIC BLOOD PRESSURE: 200 MMHG | HEART RATE: 109 BPM | RESPIRATION RATE: 16 BRPM | DIASTOLIC BLOOD PRESSURE: 96 MMHG

## 2022-08-18 DIAGNOSIS — M79.7 FIBROMYOSITIS: ICD-10-CM

## 2022-08-18 DIAGNOSIS — M51.36 DEGENERATION OF INTERVERTEBRAL DISC OF LUMBAR REGION: ICD-10-CM

## 2022-08-18 DIAGNOSIS — M50.10 CERVICAL DISC DISORDER WITH RADICULOPATHY: ICD-10-CM

## 2022-08-18 DIAGNOSIS — M54.17 LUMBOSACRAL RADICULOPATHY: ICD-10-CM

## 2022-08-18 DIAGNOSIS — M47.817 OSTEOARTHRITIS OF LUMBOSACRAL SPINE WITHOUT MYELOPATHY: ICD-10-CM

## 2022-08-18 DIAGNOSIS — M46.1 SACROILIITIS: ICD-10-CM

## 2022-08-18 DIAGNOSIS — M54.40 CHRONIC BILATERAL LOW BACK PAIN WITH SCIATICA, SCIATICA LATERALITY UNSPECIFIED: ICD-10-CM

## 2022-08-18 DIAGNOSIS — M25.552 HIP PAIN, LEFT: ICD-10-CM

## 2022-08-18 DIAGNOSIS — M54.2 NECK PAIN: ICD-10-CM

## 2022-08-18 DIAGNOSIS — Z79.899 OTHER LONG TERM (CURRENT) DRUG THERAPY: Primary | ICD-10-CM

## 2022-08-18 DIAGNOSIS — M54.16 LUMBAR RADICULITIS: ICD-10-CM

## 2022-08-18 DIAGNOSIS — M70.61 GREATER TROCHANTERIC BURSITIS, RIGHT: ICD-10-CM

## 2022-08-18 DIAGNOSIS — G89.29 CHRONIC BILATERAL LOW BACK PAIN WITH SCIATICA, SCIATICA LATERALITY UNSPECIFIED: ICD-10-CM

## 2022-08-18 PROCEDURE — 99213 OFFICE O/P EST LOW 20 MIN: CPT | Performed by: PHYSICAL MEDICINE & REHABILITATION

## 2022-08-18 RX ORDER — PHENTERMINE HYDROCHLORIDE 37.5 MG/1
37.5 CAPSULE ORAL EVERY MORNING
COMMUNITY

## 2022-08-18 RX ORDER — OXYCODONE AND ACETAMINOPHEN 10; 325 MG/1; MG/1
1 TABLET ORAL EVERY 6 HOURS PRN
Qty: 120 TABLET | Refills: 0 | Status: SHIPPED | OUTPATIENT
Start: 2022-08-18 | End: 2022-11-18 | Stop reason: SDUPTHER

## 2022-08-18 RX ORDER — GABAPENTIN 800 MG/1
800 TABLET ORAL 4 TIMES DAILY
Qty: 120 TABLET | Refills: 5 | Status: SHIPPED | OUTPATIENT
Start: 2022-08-18 | End: 2022-11-18 | Stop reason: SDUPTHER

## 2022-08-18 NOTE — PROGRESS NOTES
Subjective   Tahira Ramirez is a 67 y.o. female.     Back/neck pain. Began years ago, 10/10 at worst, 5/10 at best, always present, varies, radiates to BUE and b/l hips, aching burning, stabbing, worse with all movement, interferes with ADLs, work, failed chiropractor, massage, course of PT in July 2017 and March 2018. Saw Dr. Meeks, as above, referred for cervical ESIs, also nonsurgical. MRI C-spine with multilevel DDD, DJD. Seen by Dr. Thompson in 2016. Denies anticoagulation, allergy to iodine or contrast, current infection or ABX. Had cervical TYRA with resolution of pain and numbness into BUE, then LESI x 2 with improvement. Rereferred for b/l L1-3 MBB and RFA by Dr. Meeks, had left then right RFA with tremendous relief. New severe muscle spasms in back, failed Baclofen 10mg BID, now 10mg TID. New pain in lower LBP improves with lumbar flexion, told her L TIFFANI is intact. New numbness in LLE in different areas including her genitals, no perianal numbness, no b/b incontinence, emergent new MRI L-spine w/o signs of cauda equina syndrome, unchanged from June 2021. Failed Norco 10mg and Gabapentin 600mg TID with PCP, cannot tolerate Gabapentin 800mg TID, referred for pain management. Being set up with LSO and spine surgeon by PCP. HTN due to phentermine, being closely monitored by patient and PCP.       The following portions of the patient's history were reviewed and updated as appropriate: allergies, current medications, past family history, past medical history, past social history, past surgical history and problem list.    Review of Systems   Constitutional: Negative for chills, fatigue and fever.   HENT: Negative for hearing loss and trouble swallowing.    Eyes: Negative for visual disturbance.   Respiratory: Negative for shortness of breath.    Cardiovascular: Negative for chest pain.   Gastrointestinal: Negative for abdominal pain, constipation, diarrhea, nausea and vomiting.   Genitourinary: Negative for  urinary incontinence.   Musculoskeletal: Positive for back pain and myalgias. Negative for arthralgias, joint swelling and neck pain.   Neurological: Negative for dizziness, weakness, numbness and headache.       Objective   Physical Exam   Constitutional: She is oriented to person, place, and time. She appears well-developed and well-nourished.   HENT:   Head: Normocephalic and atraumatic.   Eyes: Pupils are equal, round, and reactive to light.   Cardiovascular: Normal rate, regular rhythm and normal heart sounds.   Pulmonary/Chest: Effort normal and breath sounds normal.   Abdominal: Soft. Normal appearance and bowel sounds are normal. She exhibits no distension. There is no abdominal tenderness.   Musculoskeletal:      Comments: (=) L NOMI and FADIR, mildly TTP b/l SIJ, strongly (+) b/l lumbar facet loading   Neurological: She is alert and oriented to person, place, and time. She has normal reflexes. She displays normal reflexes. No sensory deficit.   Normoreflexic, but 4/5 strength globally in LLE   Psychiatric: Her behavior is normal. Mood, judgment and thought content normal.         Assessment & Plan   Diagnoses and all orders for this visit:    1. Chronic bilateral low back pain with sciatica, sciatica laterality unspecified (Primary)    2. Neck pain    3. Cervical disc disorder with radiculopathy    4. Degeneration of intervertebral disc of lumbar region    5. Fibromyositis    6. Greater trochanteric bursitis, right    7. Hip pain, left    8. Lumbar radiculitis    9. Lumbosacral radiculopathy    10. Osteoarthritis of lumbosacral spine without myelopathy    11. Other long term (current) drug therapy    12. Sacroiliitis (HCC)      Received MRI L-spine from [facility], appropriate for [facility].  Had 1st cervical TYRA, then 2 ILESI.  Performed 2 b/l L1-3 MBB per Dr. Meeks, 100% relief with both.  Performed left then right L1-3 RFA. Tremendous relief, still helping.  Performed 2 b/l L5/S1 MBB for diagnostic  purposes,   Performed left L5/S1 RFA, performed on right,   Performed b/l SIJ injection.  Increased to Baclofen 20mg TID. Could no longer get Robaxin.  Began Lidoderm q12h prn.  Discussed repeat LESIs vs TFESIs, she declines for now, is afraid of injections.  Began Medrol Dose Pack.  Reviewed MRI L-spine results with patient.  Patient presented to ED for work up including new MRI L-spine, r/o cauda equina syndrome, no change from June 2021, no signs c/w cauda equina syndrome.  Discussed risks and benefits of opioid treatment for chonic pain with patient, including expectations related to prescription requests, alternative modalities to opioids for managing pain, her treatment plan, risks of dependency and addiction, and safe storage practices for prescribed opioids, as well as proper and improper disposal of all medications.  UDS in order 10/28/21.  Treatment plan will consist of continuing current medication as long as it remains effective and is necessary, while evaluating patient at each visit and determining if the medication can be lowered or discontinued, while also using nonopioid therapies to reduce reliance on opioids.  Began Percocet 10mg QID prn, failed Norco 10mg QID prn, working well.  Stopped Gabapentin, insurance would not cover Lyrica 150mg BID, restarted Gabapentin 800mg QID.  RTC 3 months for f/u.    INSPECT REPORT     As part of the patient's treatment plan, I am prescribing controlled substances. The patient has been made aware of appropriate use of such medications, including potential risk of somnolence, limited ability to drive and/or work safely, and the potential for dependence or overdose. It has also bee made clear that these medications are for use by this patient only, without concomitant use of alcohol or other substances unless prescribed.      Patient has completed prescribing agreement detailing terms of continued prescribing of controlled substances, including monitoring INSPECT  reports, urine drug screening, and pill counts if necessary. The patient is aware that inappropriate use will results in cessation of prescribing such medications.     INSPECT report has been reviewed and scanned into the patient's chart.     As the clinician, I personally reviewed the INSPECT while the patient was in the office today.     History and physical exam exhibit continued safe and appropriate use of controlled substances.      ADD: Percocet denied, needs to fail 1) non-drug treatment such as PT, massage, SCS for 6 weeks within past 2 years, and 2) two non-opioids meds within past year such as baclofen and gabapentin, which she has. Ordered PT, pt has not started, prefers to just use GoodRx until she begins Medicare.

## 2022-11-18 ENCOUNTER — OFFICE VISIT (OUTPATIENT)
Dept: PAIN MEDICINE | Facility: CLINIC | Age: 67
End: 2022-11-18

## 2022-11-18 VITALS
SYSTOLIC BLOOD PRESSURE: 183 MMHG | DIASTOLIC BLOOD PRESSURE: 88 MMHG | OXYGEN SATURATION: 98 % | HEART RATE: 95 BPM | RESPIRATION RATE: 16 BRPM

## 2022-11-18 DIAGNOSIS — M54.17 LUMBOSACRAL RADICULOPATHY: ICD-10-CM

## 2022-11-18 DIAGNOSIS — M54.2 NECK PAIN: ICD-10-CM

## 2022-11-18 DIAGNOSIS — M46.1 SACROILIITIS: ICD-10-CM

## 2022-11-18 DIAGNOSIS — M51.36 DEGENERATION OF INTERVERTEBRAL DISC OF LUMBAR REGION: ICD-10-CM

## 2022-11-18 DIAGNOSIS — M47.817 OSTEOARTHRITIS OF LUMBOSACRAL SPINE WITHOUT MYELOPATHY: ICD-10-CM

## 2022-11-18 DIAGNOSIS — M54.16 LUMBAR RADICULITIS: ICD-10-CM

## 2022-11-18 DIAGNOSIS — Z79.899 OTHER LONG TERM (CURRENT) DRUG THERAPY: ICD-10-CM

## 2022-11-18 DIAGNOSIS — M79.7 FIBROMYOSITIS: ICD-10-CM

## 2022-11-18 DIAGNOSIS — M25.552 HIP PAIN, LEFT: ICD-10-CM

## 2022-11-18 DIAGNOSIS — M54.40 CHRONIC BILATERAL LOW BACK PAIN WITH SCIATICA, SCIATICA LATERALITY UNSPECIFIED: Primary | ICD-10-CM

## 2022-11-18 DIAGNOSIS — G89.29 CHRONIC BILATERAL LOW BACK PAIN WITH SCIATICA, SCIATICA LATERALITY UNSPECIFIED: Primary | ICD-10-CM

## 2022-11-18 DIAGNOSIS — M50.10 CERVICAL DISC DISORDER WITH RADICULOPATHY: ICD-10-CM

## 2022-11-18 DIAGNOSIS — M70.61 GREATER TROCHANTERIC BURSITIS, RIGHT: ICD-10-CM

## 2022-11-18 PROCEDURE — 99213 OFFICE O/P EST LOW 20 MIN: CPT | Performed by: PHYSICAL MEDICINE & REHABILITATION

## 2022-11-18 RX ORDER — OXYCODONE AND ACETAMINOPHEN 10; 325 MG/1; MG/1
1 TABLET ORAL EVERY 6 HOURS PRN
Qty: 120 TABLET | Refills: 0 | Status: SHIPPED | OUTPATIENT
Start: 2022-11-18 | End: 2023-02-10 | Stop reason: SDUPTHER

## 2022-11-18 RX ORDER — GABAPENTIN 800 MG/1
800 TABLET ORAL 4 TIMES DAILY
Qty: 120 TABLET | Refills: 5 | Status: SHIPPED | OUTPATIENT
Start: 2022-11-18 | End: 2023-02-10 | Stop reason: SDUPTHER

## 2022-11-18 NOTE — PROGRESS NOTES
Subjective   Tahira Ramirez is a 67 y.o. female.     History of Present Illness  Back/neck pain. Began years ago, 10/10 at worst, 5/10 at best, always present, varies, radiates to BUE and b/l hips, aching burning, stabbing, worse with all movement, interferes with ADLs, work, failed chiropractor, massage, course of PT in July 2017 and March 2018. Saw Dr. Meeks, as above, referred for cervical ESIs, also nonsurgical. MRI C-spine with multilevel DDD, DJD. Seen by Dr. Thompson in 2016. Denies anticoagulation, allergy to iodine or contrast, current infection or ABX. Had cervical TYRA with resolution of pain and numbness into BUE, then LESI x 2 with improvement. Rereferred for b/l L1-3 MBB and RFA by Dr. Meeks, had left then right RFA with tremendous relief. New severe muscle spasms in back, failed Baclofen 10mg BID, now 10mg TID. New pain in lower LBP improves with lumbar flexion, told her L TIFFANI is intact. New numbness in LLE in different areas including her genitals, no perianal numbness, no b/b incontinence, emergent new MRI L-spine w/o signs of cauda equina syndrome, unchanged from June 2021. Failed Norco 10mg and Gabapentin 600mg TID with PCP, cannot tolerate Gabapentin 800mg TID, referred for pain management. Being set up with [facility] and spine surgeon by PCP. HTN due to phentermine, being closely monitored by patient and PCP. Referred out by Dr. Camacho to Dr. Pritchard, may be having surgery.       The following portions of the patient's history were reviewed and updated as appropriate: allergies, current medications, past family history, past medical history, past social history, past surgical history and problem list.    Review of Systems   Constitutional: Negative for chills, fatigue and fever.   HENT: Negative for hearing loss and trouble swallowing.    Eyes: Negative for visual disturbance.   Respiratory: Negative for shortness of breath.    Cardiovascular: Negative for chest pain.   Gastrointestinal: Negative for  abdominal pain, constipation, diarrhea, nausea and vomiting.   Genitourinary: Negative for urinary incontinence.   Musculoskeletal: Positive for back pain and myalgias. Negative for arthralgias, joint swelling and neck pain.   Neurological: Negative for dizziness, weakness, numbness and headache.       Objective   Physical Exam   Constitutional: She is oriented to person, place, and time. She appears well-developed and well-nourished.   HENT:   Head: Normocephalic and atraumatic.   Eyes: Pupils are equal, round, and reactive to light.   Cardiovascular: Normal rate, regular rhythm and normal heart sounds.   Pulmonary/Chest: Effort normal and breath sounds normal.   Abdominal: Soft. Normal appearance and bowel sounds are normal. She exhibits no distension. There is no abdominal tenderness.   Musculoskeletal:      Comments: (=) L NOMI and FADIR, mildly TTP b/l SIJ, strongly (+) b/l lumbar facet loading   Neurological: She is alert and oriented to person, place, and time. She has normal reflexes. She displays normal reflexes. No sensory deficit.   Normoreflexic, but 4/5 strength globally in LLE   Psychiatric: Her behavior is normal. Mood, judgment and thought content normal.         Assessment & Plan   Diagnoses and all orders for this visit:    1. Chronic bilateral low back pain with sciatica, sciatica laterality unspecified (Primary)    2. Neck pain    3. Cervical disc disorder with radiculopathy    4. Degeneration of intervertebral disc of lumbar region    5. Fibromyositis    6. Greater trochanteric bursitis, right    7. Hip pain, left    8. Lumbar radiculitis    9. Lumbosacral radiculopathy    10. Osteoarthritis of lumbosacral spine without myelopathy    11. Other long term (current) drug therapy    12. Sacroiliitis (HCC)      [facility]  Had 1st cervical TYRA, then 2 ILESI.  Performed 2 b/l L1-3 MBB per Dr. Meeks, 100% relief with both.  Performed left then right  L1-3 RFA. Tremendous relief, still helping.  Performed 2 b/l L5/S1 MBB for diagnostic purposes,   Performed left L5/S1 RFA, performed on right,   Performed b/l SIJ injection.  Increased to Baclofen 20mg TID. Could no longer get Robaxin.  Began Lidoderm q12h prn.  Discussed repeat LESIs vs TFESIs, she declines for now, is afraid of injections.  Began Medrol Dose Pack.  Reviewed MRI L-spine results with patient.  Patient presented to ED for work up including new MRI L-spine, r/o cauda equina syndrome, no change from June 2021, no signs c/w cauda equina syndrome.  Discussed risks and benefits of opioid treatment for chonic pain with patient, including expectations related to prescription requests, alternative modalities to opioids for managing pain, her treatment plan, risks of dependency and addiction, and safe storage practices for prescribed opioids, as well as proper and improper disposal of all medications.  UDS in order 8/18/22.  Treatment plan will consist of continuing current medication as long as it remains effective and is necessary, while evaluating patient at each visit and determining if the medication can be lowered or discontinued, while also using nonopioid therapies to reduce reliance on opioids.  Began Percocet 10mg QID prn, failed Norco 10mg QID prn, working well.  Stopped Gabapentin, insurance would not cover Lyrica 150mg BID, restarted Gabapentin 800mg QID.  RTC 3 months for f/u.    INSPECT REPORT     As part of the patient's treatment plan, I am prescribing controlled substances. The patient has been made aware of appropriate use of such medications, including potential risk of somnolence, limited ability to drive and/or work safely, and the potential for dependence or overdose. It has also bee made clear that these medications are for use by this patient only, without concomitant use of alcohol or other substances unless prescribed.      Patient has completed prescribing agreement detailing  terms of continued prescribing of controlled substances, including monitoring INSPECT reports, urine drug screening, and pill counts if necessary. The patient is aware that inappropriate use will results in cessation of prescribing such medications.     INSPECT report has been reviewed and scanned into the patient's chart.     As the clinician, I personally reviewed the INSPECT while the patient was in the office today.     History and physical exam exhibit continued safe and appropriate use of controlled substances.      ADD: Percocet denied, needs to fail 1) non-drug treatment such as PT, massage, SCS for 6 weeks within past 2 years, and 2) two non-opioids meds within past year such as baclofen and gabapentin, which she has. Ordered PT, pt has not started, prefers to just use GoodRx until she begins Medicare.

## 2022-12-07 RX ORDER — BACLOFEN 20 MG/1
TABLET ORAL
Qty: 90 TABLET | Refills: 3 | Status: SHIPPED | OUTPATIENT
Start: 2022-12-07 | End: 2023-03-30

## 2023-01-24 ENCOUNTER — TELEPHONE (OUTPATIENT)
Dept: PAIN MEDICINE | Facility: CLINIC | Age: 68
End: 2023-01-24

## 2023-01-24 NOTE — TELEPHONE ENCOUNTER
Caller: PATIENT    Relationship: SELF     Best call back number: [phone]    Requested Prescriptions: PERCOCET  MG.         Pharmacy where request should be sent:  [facility]- [phone]    Additional details provided by patient: PT. NEEDS REFILL ON     Does the patient have less than a 3 day supply:  [x] Yes  [] No    Would you like a call back once the refill request has been completed: [x] Yes [] No    If the office needs to give you a call back, can they leave a voicemail: [x] Yes [] No

## 2023-01-24 NOTE — TELEPHONE ENCOUNTER
Patient doesn't need a refill.   Pharmacy needs clarification form you to OK her taking Wellbutrin and Oxycodone.

## 2023-01-24 NOTE — TELEPHONE ENCOUNTER
It should be fine. At worst it may make her Oxycodone slightly less effective, that's all. Thanks.

## 2023-01-25 NOTE — TELEPHONE ENCOUNTER
1/25/23-- spoke to pt-- need to call pharmacy , [facility]  in [place], left voice message, is ok to fill  both rxes, we know she is on both medications per Dr Arora/Wilma(left voice message at pharmacy at 8:31am 1/25/23) also spoke to pt

## 2023-02-10 ENCOUNTER — OFFICE VISIT (OUTPATIENT)
Dept: PAIN MEDICINE | Facility: CLINIC | Age: 68
End: 2023-02-10
Payer: MEDICARE

## 2023-02-10 VITALS
RESPIRATION RATE: 16 BRPM | SYSTOLIC BLOOD PRESSURE: 190 MMHG | DIASTOLIC BLOOD PRESSURE: 89 MMHG | OXYGEN SATURATION: 97 % | HEART RATE: 83 BPM

## 2023-02-10 DIAGNOSIS — M25.552 HIP PAIN, LEFT: ICD-10-CM

## 2023-02-10 DIAGNOSIS — Z79.899 OTHER LONG TERM (CURRENT) DRUG THERAPY: ICD-10-CM

## 2023-02-10 DIAGNOSIS — M79.7 FIBROMYOSITIS: ICD-10-CM

## 2023-02-10 DIAGNOSIS — M54.2 NECK PAIN: ICD-10-CM

## 2023-02-10 DIAGNOSIS — M50.10 CERVICAL DISC DISORDER WITH RADICULOPATHY: ICD-10-CM

## 2023-02-10 DIAGNOSIS — M70.61 GREATER TROCHANTERIC BURSITIS, RIGHT: ICD-10-CM

## 2023-02-10 DIAGNOSIS — M54.16 LUMBAR RADICULITIS: ICD-10-CM

## 2023-02-10 DIAGNOSIS — M47.817 OSTEOARTHRITIS OF LUMBOSACRAL SPINE WITHOUT MYELOPATHY: ICD-10-CM

## 2023-02-10 DIAGNOSIS — M54.17 LUMBOSACRAL RADICULOPATHY: ICD-10-CM

## 2023-02-10 DIAGNOSIS — M54.40 CHRONIC BILATERAL LOW BACK PAIN WITH SCIATICA, SCIATICA LATERALITY UNSPECIFIED: Primary | ICD-10-CM

## 2023-02-10 DIAGNOSIS — M46.1 SACROILIITIS: ICD-10-CM

## 2023-02-10 DIAGNOSIS — M51.36 DEGENERATION OF INTERVERTEBRAL DISC OF LUMBAR REGION: ICD-10-CM

## 2023-02-10 DIAGNOSIS — G89.29 CHRONIC BILATERAL LOW BACK PAIN WITH SCIATICA, SCIATICA LATERALITY UNSPECIFIED: Primary | ICD-10-CM

## 2023-02-10 PROCEDURE — 99213 OFFICE O/P EST LOW 20 MIN: CPT | Performed by: PHYSICAL MEDICINE & REHABILITATION

## 2023-02-10 RX ORDER — OXYCODONE AND ACETAMINOPHEN 10; 325 MG/1; MG/1
1 TABLET ORAL EVERY 6 HOURS PRN
Qty: 120 TABLET | Refills: 0 | Status: SHIPPED | OUTPATIENT
Start: 2023-02-10

## 2023-02-10 RX ORDER — GABAPENTIN 800 MG/1
800 TABLET ORAL 4 TIMES DAILY
Qty: 120 TABLET | Refills: 5 | Status: SHIPPED | OUTPATIENT
Start: 2023-02-10

## 2023-02-10 NOTE — PROGRESS NOTES
Subjective   Tahira Ramirez is a 67 y.o. female.     History of Present Illness  Back/neck pain. Began years ago, 10/10 at worst, 5/10 at best, always present, varies, radiates to BUE and b/l hips, aching burning, stabbing, worse with all movement, interferes with ADLs, work, failed chiropractor, massage, course of PT in July 2017 and March 2018. Saw Dr. Meeks, as above, referred for cervical ESIs, also nonsurgical. MRI C-spine with multilevel DDD, DJD. Seen by Dr. Thompson in 2016. Denies anticoagulation, allergy to iodine or contrast, current infection or ABX. Had cervical TYRA with resolution of pain and numbness into BUE, then LESI x 2 with improvement. Rereferred for b/l L1-3 MBB and RFA by Dr. Meeks, had left then right RFA with tremendous relief. New severe muscle spasms in back, failed Baclofen 10mg BID, now 10mg TID. New pain in lower LBP improves with lumbar flexion, told her L TIFFANI is intact. New numbness in LLE in different areas including her genitals, no perianal numbness, no b/b incontinence, emergent new MRI L-spine w/o signs of cauda equina syndrome, unchanged from June 2021. Failed Norco 10mg and Gabapentin 600mg TID with PCP, cannot tolerate Gabapentin 800mg TID, referred for pain management. Being set up with [facility] and spine surgeon by PCP. HTN due to phentermine, being closely monitored by patient and PCP. Referred out by Dr. Camacho to Dr. Pritchard, may be having surgery but will likely be confined to a  after, she has a meeting in March 2023.       The following portions of the patient's history were reviewed and updated as appropriate: allergies, current medications, past family history, past medical history, past social history, past surgical history and problem list.    Review of Systems   Constitutional: Negative for chills, fatigue and fever.   HENT: Negative for hearing loss and trouble swallowing.    Eyes: Negative for visual disturbance.   Respiratory: Negative for shortness of breath.     Cardiovascular: Negative for chest pain.   Gastrointestinal: Negative for abdominal pain, constipation, diarrhea, nausea and vomiting.   Genitourinary: Negative for urinary incontinence.   Musculoskeletal: Positive for back pain and myalgias. Negative for arthralgias, joint swelling and neck pain.   Neurological: Negative for dizziness, weakness, numbness and headache.       Objective   Physical Exam   Constitutional: She is oriented to person, place, and time. She appears well-developed and well-nourished.   HENT:   Head: Normocephalic and atraumatic.   Eyes: Pupils are equal, round, and reactive to light.   Cardiovascular: Normal rate, regular rhythm and normal heart sounds.   Pulmonary/Chest: Effort normal and breath sounds normal.   Abdominal: Soft. Normal appearance and bowel sounds are normal. She exhibits no distension. There is no abdominal tenderness.   Musculoskeletal:      Comments: (=) L NOMI and FADIR, mildly TTP b/l SIJ, strongly (+) b/l lumbar facet loading   Neurological: She is alert and oriented to person, place, and time. She has normal reflexes. She displays normal reflexes. No sensory deficit.   Normoreflexic, but 4/5 strength globally in LLE   Psychiatric: Her behavior is normal. Mood, judgment and thought content normal.         Assessment & Plan   Diagnoses and all orders for this visit:    1. Chronic bilateral low back pain with sciatica, sciatica laterality unspecified (Primary)    2. Neck pain    3. Cervical disc disorder with radiculopathy    4. Degeneration of intervertebral disc of lumbar region    5. Fibromyositis    6. Greater trochanteric bursitis, right    7. Hip pain, left    8. Lumbar radiculitis    9. Lumbosacral radiculopathy    10. Osteoarthritis of lumbosacral spine without myelopathy    11. Other long term (current) drug therapy    12. Sacroiliitis (HCC)      Received MRI L-spine from [facility], appropriate for LESIs.  Had 1st cervical TYRA, then 2 ILESI.  Performed 2  b/l L1-3 MBB per Dr. Meeks, 100% relief with both.  Performed left then right L1-3 RFA. Tremendous relief, still helping.  Performed 2 b/l L5/S1 MBB for diagnostic purposes,   Performed left L5/S1 RFA, performed on right,   Performed b/l SIJ injection.  Increased to Baclofen 20mg TID. Could no longer get Robaxin.  Began Lidoderm q12h prn.  Discussed repeat LESIs vs TFESIs, she declines for now, is afraid of injections.  Began Medrol Dose Pack.  Reviewed MRI L-spine results with patient.  Patient presented to ED for work up including new MRI L-spine, r/o cauda equina syndrome, no change from June 2021, no signs c/w cauda equina syndrome.  Discussed risks and benefits of opioid treatment for chonic pain with patient, including expectations related to prescription requests, alternative modalities to opioids for managing pain, her treatment plan, risks of dependency and addiction, and safe storage practices for prescribed opioids, as well as proper and improper disposal of all medications.  UDS in order 8/18/22.  Treatment plan will consist of continuing current medication as long as it remains effective and is necessary, while evaluating patient at each visit and determining if the medication can be lowered or discontinued, while also using nonopioid therapies to reduce reliance on opioids.  Began Percocet 10mg QID prn, failed Norco 10mg QID prn, working well.  Stopped Gabapentin, insurance would not cover Lyrica 150mg BID, restarted Gabapentin 800mg QID.  RTC 3 months for f/u.    INSPECT REPORT     As part of the patient's treatment plan, I am prescribing controlled substances. The patient has been made aware of appropriate use of such medications, including potential risk of somnolence, limited ability to drive and/or work safely, and the potential for dependence or overdose. It has also bee made clear that these medications are for use by this patient only, without concomitant use of alcohol or other substances  unless prescribed.      Patient has completed prescribing agreement detailing terms of continued prescribing of controlled substances, including monitoring INSPECT reports, urine drug screening, and pill counts if necessary. The patient is aware that inappropriate use will results in cessation of prescribing such medications.     INSPECT report has been reviewed and scanned into the patient's chart.     As the clinician, I personally reviewed the INSPECT while the patient was in the office today.     History and physical exam exhibit continued safe and appropriate use of controlled substances.      ADD: Percocet denied, needs to fail 1) non-drug treatment such as PT, massage, SCS for 6 weeks within past 2 years, and 2) two non-opioids meds within past year such as baclofen and gabapentin, which she has. Ordered PT, pt has not started, prefers to just use Good[facility] until she begins Medicare.

## 2023-03-30 RX ORDER — BACLOFEN 20 MG/1
TABLET ORAL
Qty: 90 TABLET | Refills: 3 | Status: SHIPPED | OUTPATIENT
Start: 2023-03-30

## 2023-05-08 ENCOUNTER — TELEHEALTH PROVIDED IN PATIENT'S HOME (AMBULATORY)
Dept: URBAN - METROPOLITAN AREA TELEHEALTH 4 | Facility: TELEHEALTH | Age: 68
End: 2023-05-08

## 2023-05-08 VITALS — HEIGHT: 67 IN

## 2023-05-08 DIAGNOSIS — R13.10 DYSPHAGIA, UNSPECIFIED: ICD-10-CM

## 2023-05-08 DIAGNOSIS — I82.409 ACUTE EMBOLISM AND THROMBOSIS OF UNSPECIFIED DEEP VEINS OF U: ICD-10-CM

## 2023-05-08 DIAGNOSIS — F11.90 OPIOID USE, UNSPECIFIED, UNCOMPLICATED: ICD-10-CM

## 2023-05-08 DIAGNOSIS — D50.9 IRON DEFICIENCY ANEMIA, UNSPECIFIED: ICD-10-CM

## 2023-05-08 DIAGNOSIS — Z98.84 BARIATRIC SURGERY STATUS: ICD-10-CM

## 2023-05-08 DIAGNOSIS — Z90.49 ACQUIRED ABSENCE OF OTHER SPECIFIED PARTS OF DIGESTIVE TRACT: ICD-10-CM

## 2023-05-08 DIAGNOSIS — R11.10 VOMITING, UNSPECIFIED: ICD-10-CM

## 2023-05-08 DIAGNOSIS — I10 ESSENTIAL (PRIMARY) HYPERTENSION: ICD-10-CM

## 2023-05-08 PROCEDURE — 99213 OFFICE O/P EST LOW 20 MIN: CPT | Performed by: INTERNAL MEDICINE

## 2023-05-08 RX ORDER — FAMOTIDINE 40 MG/1
TABLET, FILM COATED ORAL
Qty: 30 | Refills: 10 | Status: ACTIVE
Start: 2023-05-08

## 2023-05-19 ENCOUNTER — OFFICE VISIT (OUTPATIENT)
Dept: PAIN MEDICINE | Facility: CLINIC | Age: 68
End: 2023-05-19
Payer: MEDICARE

## 2023-05-19 VITALS
DIASTOLIC BLOOD PRESSURE: 73 MMHG | SYSTOLIC BLOOD PRESSURE: 121 MMHG | HEART RATE: 80 BPM | RESPIRATION RATE: 16 BRPM | OXYGEN SATURATION: 97 %

## 2023-05-19 DIAGNOSIS — M51.36 DEGENERATION OF INTERVERTEBRAL DISC OF LUMBAR REGION: ICD-10-CM

## 2023-05-19 DIAGNOSIS — M54.16 LUMBAR RADICULITIS: ICD-10-CM

## 2023-05-19 DIAGNOSIS — M46.1 SACROILIITIS: ICD-10-CM

## 2023-05-19 DIAGNOSIS — M54.17 LUMBOSACRAL RADICULOPATHY: ICD-10-CM

## 2023-05-19 DIAGNOSIS — M47.817 OSTEOARTHRITIS OF LUMBOSACRAL SPINE WITHOUT MYELOPATHY: ICD-10-CM

## 2023-05-19 DIAGNOSIS — M54.40 CHRONIC BILATERAL LOW BACK PAIN WITH SCIATICA, SCIATICA LATERALITY UNSPECIFIED: Primary | ICD-10-CM

## 2023-05-19 DIAGNOSIS — M70.61 GREATER TROCHANTERIC BURSITIS, RIGHT: ICD-10-CM

## 2023-05-19 DIAGNOSIS — M54.2 NECK PAIN: ICD-10-CM

## 2023-05-19 DIAGNOSIS — G89.29 CHRONIC BILATERAL LOW BACK PAIN WITH SCIATICA, SCIATICA LATERALITY UNSPECIFIED: Primary | ICD-10-CM

## 2023-05-19 DIAGNOSIS — M79.7 FIBROMYOSITIS: ICD-10-CM

## 2023-05-19 DIAGNOSIS — M25.552 HIP PAIN, LEFT: ICD-10-CM

## 2023-05-19 DIAGNOSIS — M50.10 CERVICAL DISC DISORDER WITH RADICULOPATHY: ICD-10-CM

## 2023-05-19 DIAGNOSIS — Z79.899 OTHER LONG TERM (CURRENT) DRUG THERAPY: ICD-10-CM

## 2023-05-19 RX ORDER — FAMOTIDINE 40 MG/1
40 TABLET, FILM COATED ORAL NIGHTLY
COMMUNITY
Start: 2023-05-08

## 2023-05-19 RX ORDER — OXYCODONE AND ACETAMINOPHEN 10; 325 MG/1; MG/1
1 TABLET ORAL EVERY 6 HOURS PRN
Qty: 120 TABLET | Refills: 0 | Status: SHIPPED | OUTPATIENT
Start: 2023-05-19

## 2023-05-19 RX ORDER — GABAPENTIN 800 MG/1
800 TABLET ORAL 4 TIMES DAILY
Qty: 120 TABLET | Refills: 5 | Status: SHIPPED | OUTPATIENT
Start: 2023-05-19

## 2023-05-19 NOTE — PROGRESS NOTES
Subjective   Tahira Ramirez is a 67 y.o. female.     History of Present Illness  Back/neck pain. Began years ago, 10/10 at worst, 5/10 at best, always present, varies, radiates to BUE and b/l hips, aching burning, stabbing, worse with all movement, interferes with ADLs, work, failed chiropractor, massage, course of PT in July 2017 and March 2018. Saw Dr. Meeks, as above, referred for cervical ESIs, also nonsurgical. MRI C-spine with multilevel DDD, DJD. Seen by Dr. Thompson in 2016. Denies anticoagulation, allergy to iodine or contrast, current infection or ABX. Had cervical TYRA with resolution of pain and numbness into BUE, then LESI x 2 with improvement. Rereferred for b/l L1-3 MBB and RFA by Dr. Meeks, had left then right RFA with tremendous relief. New severe muscle spasms in back, failed Baclofen 10mg BID, now 10mg TID. New pain in lower LBP improves with lumbar flexion, told her L TIFFANI is intact. New numbness in LLE in different areas including her genitals, no perianal numbness, no b/b incontinence, emergent new MRI L-spine w/o signs of cauda equina syndrome, unchanged from June 2021. Failed Norco 10mg and Gabapentin 600mg TID with PCP, cannot tolerate Gabapentin 800mg TID, referred for pain management. Being set up with [facility] and spine surgeon by PCP. HTN due to phentermine, being closely monitored by patient and PCP. Referred out by Dr. Camacho to Dr. Pritchard, may be having surgery but will likely be confined to a  after, she has a meeting in March 2023.       The following portions of the patient's history were reviewed and updated as appropriate: allergies, current medications, past family history, past medical history, past social history, past surgical history and problem list.    Review of Systems   Constitutional: Negative for chills, fatigue and fever.   HENT: Negative for hearing loss and trouble swallowing.    Eyes: Negative for visual disturbance.   Respiratory: Negative for shortness of breath.     Cardiovascular: Negative for chest pain.   Gastrointestinal: Negative for abdominal pain, constipation, diarrhea, nausea and vomiting.   Genitourinary: Negative for urinary incontinence.   Musculoskeletal: Positive for back pain and myalgias. Negative for arthralgias, joint swelling and neck pain.   Neurological: Negative for dizziness, weakness, numbness and headache.       Objective   Physical Exam   Constitutional: She is oriented to person, place, and time. She appears well-developed and well-nourished.   HENT:   Head: Normocephalic and atraumatic.   Eyes: Pupils are equal, round, and reactive to light.   Cardiovascular: Normal rate, regular rhythm and normal heart sounds.   Pulmonary/Chest: Effort normal and breath sounds normal.   Abdominal: Soft. Normal appearance and bowel sounds are normal. She exhibits no distension. There is no abdominal tenderness.   Musculoskeletal:      Comments: (=) L NOMI and FADIR, mildly TTP b/l SIJ, strongly (+) b/l lumbar facet loading   Neurological: She is alert and oriented to person, place, and time. She has normal reflexes. She displays normal reflexes. No sensory deficit.   Normoreflexic, but 4/5 strength globally in LLE   Psychiatric: Her behavior is normal. Mood, judgment and thought content normal.         Assessment & Plan   Diagnoses and all orders for this visit:    1. Chronic bilateral low back pain with sciatica, sciatica laterality unspecified (Primary)    2. Neck pain    3. Cervical disc disorder with radiculopathy    4. Degeneration of intervertebral disc of lumbar region    5. Fibromyositis    6. Greater trochanteric bursitis, right    7. Hip pain, left    8. Lumbar radiculitis    9. Lumbosacral radiculopathy    10. Osteoarthritis of lumbosacral spine without myelopathy    11. Other long term (current) drug therapy    12. Sacroiliitis      [facility]  Had 1st cervical TYRA, then 2 ILESI.  Performed 2 b/l L1-3  MBB per Dr. Meeks, 100% relief with both.  Performed left then right L1-3 RFA. Tremendous relief, still helping.  Performed 2 b/l L5/S1 MBB for diagnostic purposes,   Performed left L5/S1 RFA, performed on right,   Performed b/l SIJ injection.  Increased to Baclofen 20mg TID. Could no longer get Robaxin.  Began Lidoderm q12h prn.  Discussed repeat LESIs vs TFESIs, schedule left L4 & L5 TFESI.  Began Medrol Dose Pack.  Reviewed MRI L-spine results with patient.  New MRI L-spine reviewed, essentially nonsurgical per surgeon as surgery would be too extensive.  Patient presented to ED for work up including new MRI L-spine, r/o cauda equina syndrome, no change from June 2021, no signs c/w cauda equina syndrome.  Discussed risks and benefits of opioid treatment for chonic pain with patient, including expectations related to prescription requests, alternative modalities to opioids for managing pain, her treatment plan, risks of dependency and addiction, and safe storage practices for prescribed opioids, as well as proper and improper disposal of all medications.  UDS in order 8/18/22.  Treatment plan will consist of continuing current medication as long as it remains effective and is necessary, while evaluating patient at each visit and determining if the medication can be lowered or discontinued, while also using nonopioid therapies to reduce reliance on opioids.  Began Percocet 10mg QID prn, failed Norco 10mg QID prn, working well.  Stopped Gabapentin, insurance would not cover Lyrica 150mg BID, restarted Gabapentin 800mg QID.  RTC for left L4 & L5 TFESI then in 3 months for f/u.    INSPECT REPORT     As part of the patient's treatment plan, I am prescribing controlled substances. The patient has been made aware of appropriate use of such medications, including potential risk of somnolence, limited ability to drive and/or work safely, and the potential for dependence or overdose. It has also bee made clear that these  medications are for use by this patient only, without concomitant use of alcohol or other substances unless prescribed.      Patient has completed prescribing agreement detailing terms of continued prescribing of controlled substances, including monitoring INSPECT reports, urine drug screening, and pill counts if necessary. The patient is aware that inappropriate use will results in cessation of prescribing such medications.     INSPECT report has been reviewed and scanned into the patient's chart.     As the clinician, I personally reviewed the INSPECT while the patient was in the office today.     History and physical exam exhibit continued safe and appropriate use of controlled substances.      ADD: Percocet denied, needs to fail 1) non-drug treatment such as PT, massage, SCS for 6 weeks within past 2 years, and 2) two non-opioids meds within past year such as baclofen and gabapentin, which she has. Ordered PT, pt has not started, prefers to just use GoodRx until she begins Medicare.

## 2023-05-30 ENCOUNTER — HOSPITAL ENCOUNTER (OUTPATIENT)
Dept: PAIN MEDICINE | Facility: HOSPITAL | Age: 68
Discharge: HOME | End: 2023-05-30

## 2023-05-30 VITALS
BODY MASS INDEX: 31.55 KG/M2 | HEART RATE: 68 BPM | SYSTOLIC BLOOD PRESSURE: 175 MMHG | HEIGHT: 67 IN | TEMPERATURE: 97.4 F | OXYGEN SATURATION: 100 % | DIASTOLIC BLOOD PRESSURE: 84 MMHG | WEIGHT: 201 LBS | RESPIRATION RATE: 16 BRPM

## 2023-05-30 DIAGNOSIS — M54.40 CHRONIC BILATERAL LOW BACK PAIN WITH SCIATICA, SCIATICA LATERALITY UNSPECIFIED: ICD-10-CM

## 2023-05-30 DIAGNOSIS — M54.16 LUMBAR RADICULITIS: Primary | ICD-10-CM

## 2023-05-30 DIAGNOSIS — R52 PAIN: ICD-10-CM

## 2023-05-30 DIAGNOSIS — M48.062 SPINAL STENOSIS, LUMBAR REGION, WITH NEUROGENIC CLAUDICATION: ICD-10-CM

## 2023-05-30 DIAGNOSIS — G89.29 CHRONIC BILATERAL LOW BACK PAIN WITH SCIATICA, SCIATICA LATERALITY UNSPECIFIED: ICD-10-CM

## 2023-05-30 PROCEDURE — 77003 FLUOROGUIDE FOR SPINE INJECT: CPT

## 2023-05-30 PROCEDURE — 25510000001 IOPAMIDOL 41 % SOLUTION: Performed by: PHYSICAL MEDICINE & REHABILITATION

## 2023-05-30 PROCEDURE — 25010000002 DEXAMETHASONE SODIUM PHOSPHATE 10 MG/ML SOLUTION: Performed by: PHYSICAL MEDICINE & REHABILITATION

## 2023-05-30 RX ORDER — LIDOCAINE HYDROCHLORIDE 10 MG/ML
5 INJECTION, SOLUTION EPIDURAL; INFILTRATION; INTRACAUDAL; PERINEURAL ONCE
Status: COMPLETED | OUTPATIENT
Start: 2023-05-30 | End: 2023-05-30

## 2023-05-30 RX ORDER — DEXAMETHASONE SODIUM PHOSPHATE 10 MG/ML
10 INJECTION, SOLUTION INTRAMUSCULAR; INTRAVENOUS ONCE
Status: COMPLETED | OUTPATIENT
Start: 2023-05-30 | End: 2023-05-30

## 2023-05-30 RX ADMIN — IOPAMIDOL 1 ML: 408 INJECTION, SOLUTION INTRATHECAL at 11:27

## 2023-05-30 RX ADMIN — DEXAMETHASONE SODIUM PHOSPHATE 10 MG: 10 INJECTION, SOLUTION INTRAMUSCULAR; INTRAVENOUS at 11:28

## 2023-05-30 RX ADMIN — LIDOCAINE HYDROCHLORIDE 5 ML: 10 INJECTION, SOLUTION EPIDURAL; INFILTRATION; INTRACAUDAL; PERINEURAL at 11:28

## 2023-05-30 NOTE — PROCEDURES
Procedures     Back/neck pain. Began years ago, 10/10 at worst, 5/10 at best, always present, varies, radiates to BUE and b/l hips, aching burning, stabbing, worse with all movement, interferes with ADLs, work, failed chiropractor, massage, course of PT in July 2017 and March 2018. Saw Dr. Meeks, as above, referred for cervical ESIs, also nonsurgical. MRI C-spine with multilevel DDD, DJD. Seen by Dr. Thompson in 2016. Denies anticoagulation, allergy to iodine or contrast, current infection or ABX. Had cervical TYRA with resolution of pain and numbness into BUE, then LESI x 2 with improvement. Rereferred for b/l L1-3 MBB and RFA by Dr. Meeks, had left then right RFA with tremendous relief. New severe muscle spasms in back, failed Baclofen 10mg BID, now 10mg TID. New pain in lower LBP improves with lumbar flexion, told her L TIFFANI is intact. New numbness in LLE in different areas including her genitals, no perianal numbness, no b/b incontinence, emergent new MRI L-spine w/o signs of cauda equina syndrome, unchanged from June 2021. Failed Norco 10mg and Gabapentin 600mg TID with PCP, cannot tolerate Gabapentin 800mg TID, referred for pain management. Being set up with [facility] and spine surgeon by PCP. HTN due to phentermine, being closely monitored by patient and PCP. Referred out by Dr. Camacho to Dr. Pritchard, may be having surgery but will likely be confined to a  after, she has a meeting in March 2023.      Received MRI L-spine from [facility], appropriate for LESIs.  Had 1st cervical TYRA, then 2 ILESI.  Performed 2 b/l L1-3 MBB per Dr. Meeks, 100% relief with both.  Performed left then right L1-3 RFA. Tremendous relief, still helping.  Performed 2 b/l L5/S1 MBB for diagnostic purposes,   Performed left L5/S1 RFA, performed on right,   Performed b/l SIJ injection.  Increased to Baclofen 20mg TID. Could no longer get Robaxin.  Began Lidoderm q12h prn.  Discussed repeat LESIs vs TFESIs, perform left L4 & L5 TFESI.  Began  Medrol Dose Pack.  Reviewed MRI L-spine results with patient.  New MRI L-spine reviewed, essentially nonsurgical per surgeon as surgery would be too extensive.  Patient presented to ED for work up including new MRI L-spine, r/o cauda equina syndrome, no change from June 2021, no signs c/w cauda equina syndrome.  Discussed risks and benefits of opioid treatment for chonic pain with patient, including expectations related to prescription requests, alternative modalities to opioids for managing pain, her treatment plan, risks of dependency and addiction, and safe storage practices for prescribed opioids, as well as proper and improper disposal of all medications.  UDS in order 8/18/22.  Treatment plan will consist of continuing current medication as long as it remains effective and is necessary, while evaluating patient at each visit and determining if the medication can be lowered or discontinued, while also using nonopioid therapies to reduce reliance on opioids.  Began Percocet 10mg QID prn, failed Norco 10mg QID prn, working well.  Stopped Gabapentin, insurance would not cover Lyrica 150mg BID, restarted Gabapentin 800mg QID.  RTC in 3 months for f/u.        Lumbar Transforaminal Epidural Steroid Injection    PREOPERATIVE DIAGNOSIS: Lumbar spinal stenosis    POSTOPERATIVE DIAGNOSIS: Lumbar spinal stenosis    PROCEDURE PERFORMED: Transforaminal Epidural, left L4 & L5    The patient presents with a history of  lumbar degenerative disc disease with stenosis. The patient presents today for a [ transforaminal epidural ] at left L4 & L5.  This is the [ first ] procedure. The patient understands the risks and benefits of the procedure and wishes to proceed. The patient was seen in the preoperative area.  Patient's consent was obtained and updated.  Vitals were taken.  Patient was then brought to the procedure suite and placed in a prone position. The appropriate anatomic area was widely prepped with Chloroprep and draped  in a sterile fashion.  Under fluoroscopic guidance using oblique view, a 25 guage curved tip spinal needle  was passed through skin anesthetized with 1% Lidocaine without epinephrine. The needle tip was advanced to the inferior medial aspect of the transverse process and carefully walked into the neuroforamin.  At no time were parathesias elicited. Preservative free contrast 1 ml was injected under live fluoro to verify epidural placement. At this point [ 2.5 ] mL of a solution containing [ Dexamethasone 10mg and Lidocaine PF 1% 4ml ] were injected. The patient reported no discomfort with injection. The procedure was repeated in all respects at the left L5 - S1 neuroforamen.  A sterile dressing was placed over the puncture sites.    The patient tolerated the procedure with [ no complications ]. They were then brought to the post procedure area where they recovered nicely.    Discharge:  The patient will be discharged home in stable condition.   Patient understands to contact the Center with any post procedure questions or concerns.  Discharge instructions given by nursing staff.

## 2023-05-30 NOTE — DISCHARGE INSTRUCTIONS

## 2023-05-31 ENCOUNTER — TELEPHONE (OUTPATIENT)
Dept: PAIN MEDICINE | Facility: HOSPITAL | Age: 68
End: 2023-05-31

## 2023-06-01 PROBLEM — R13.10 DYSPHAGIA: Status: ACTIVE | Noted: 2018-09-05

## 2023-06-05 PROBLEM — M48.062 SPINAL STENOSIS, LUMBAR REGION, WITH NEUROGENIC CLAUDICATION: Status: ACTIVE | Noted: 2023-06-05

## 2023-06-05 NOTE — ADDENDUM NOTE
Encounter addended by: Reece Arora MD on: 6/5/2023 4:48 PM   Actions taken: Problem List modified, Visit diagnoses modified

## 2023-06-26 ENCOUNTER — TELEPHONE (OUTPATIENT)
Dept: PAIN MEDICINE | Facility: CLINIC | Age: 68
End: 2023-06-26

## 2023-06-26 DIAGNOSIS — G89.29 CHRONIC BILATERAL LOW BACK PAIN WITH SCIATICA, SCIATICA LATERALITY UNSPECIFIED: ICD-10-CM

## 2023-06-26 DIAGNOSIS — M54.40 CHRONIC BILATERAL LOW BACK PAIN WITH SCIATICA, SCIATICA LATERALITY UNSPECIFIED: ICD-10-CM

## 2023-06-26 RX ORDER — OXYCODONE AND ACETAMINOPHEN 10; 325 MG/1; MG/1
1 TABLET ORAL EVERY 6 HOURS PRN
Qty: 120 TABLET | Refills: 0 | Status: SHIPPED | OUTPATIENT
Start: 2023-06-26 | End: 2023-07-25 | Stop reason: SDUPTHER

## 2023-06-26 NOTE — TELEPHONE ENCOUNTER
Caller: JACOB TAVAREZ    Relationship: PATIENT     Best call back number: [phone]    Requested Prescriptions:   Requested Prescriptions      No prescriptions requested or ordered in this encounter       oxyCODONE-acetaminophen (Percocet)  MG per tablet 1 tablet Oral Every 6 Hours PRN 60 MME/Day       Pharmacy where request should be sent:   [facility]  [facility] [place]  [address]  PHONE [phone]    Last office visit with prescribing clinician: 5/19/2023   Last telemedicine visit with prescribing clinician: Visit date not found   Next office visit with prescribing clinician: 8/24/2023     Additional details provided by patient: PATIENT SAYS [facility] WILL NOT CONFIRM OR DENY IF THEY HAVE RX - EVEN ADVISED PATIENT SHE MAY WANT TO GET ANOTHER RX.  PATIENT HAS BEEN OUT OF RX SINCE SATURDAY 06/24/23 AND DOESN'T KNOW WHAT TO DO.  [facility] DOES NOT HAVE AND THEY DO NOT KNOW WHEN THEY WILL GET THEM IN    Does the patient have less than a 3 day supply:  [x] Yes  [] No    Would you like a call back once the refill request has been completed: [x] Yes [] No    If the office needs to give you a call back, can they leave a voicemail: [x] Yes [] No    Chantal Palmer   06/26/23 09:58 EDT

## 2023-06-26 NOTE — TELEPHONE ENCOUNTER
Provider: DR BENNETT  Caller: JACOB TAVAREZ   Relationship to Patient: SELF  Ph[facility] [facility] [address], [facility] [place] [address] PHONE ([phone])   Phone Number: [phone]    PATIENT FOUND A PHARMACY THAT HAS THE MEDICATION THAT SHE NEEDS. PHARMACY INFORMATION IS ABOVE FOR [facility]    PATIENT REQUESTING CALL BACK. PLEASE ADVISE.

## 2023-07-25 DIAGNOSIS — G89.29 CHRONIC BILATERAL LOW BACK PAIN WITH SCIATICA, SCIATICA LATERALITY UNSPECIFIED: ICD-10-CM

## 2023-07-25 DIAGNOSIS — M54.40 CHRONIC BILATERAL LOW BACK PAIN WITH SCIATICA, SCIATICA LATERALITY UNSPECIFIED: ICD-10-CM

## 2023-07-25 RX ORDER — OXYCODONE AND ACETAMINOPHEN 10; 325 MG/1; MG/1
1 TABLET ORAL EVERY 6 HOURS PRN
Qty: 120 TABLET | Refills: 0 | Status: SHIPPED | OUTPATIENT
Start: 2023-07-25

## 2023-07-25 NOTE — TELEPHONE ENCOUNTER
Caller: JACOB    Relationship:     Best call back number: [phone]    Requested Prescriptions:   oxyCODONE-acetaminophen (Percocet)  MG per tablet     Pharmacy where request should be sent:    [facility]  [address] [place] [address]  [phone]    Last office visit with prescribing clinician: 5/19/2023   Last telemedicine visit with prescribing clinician: Visit date not found   Next office visit with prescribing clinician: 8/24/2023     Additional details provided by patient:     Does the patient have less than a 3 day supply:  [x] Yes  [] No    Would you like a call back once the refill request has been completed: [x] Yes [] No    If the office needs to give you a call back, can they leave a voicemail: [x] Yes [] No    Chantal Kate Rep   07/25/23 09:31 EDT

## 2023-08-01 RX ORDER — BACLOFEN 20 MG/1
TABLET ORAL
Qty: 90 TABLET | Refills: 3 | Status: SHIPPED | OUTPATIENT
Start: 2023-08-01

## 2023-08-24 ENCOUNTER — OFFICE VISIT (OUTPATIENT)
Dept: PAIN MEDICINE | Facility: CLINIC | Age: 68
End: 2023-08-24
Payer: MEDICARE

## 2023-08-24 VITALS
SYSTOLIC BLOOD PRESSURE: 162 MMHG | RESPIRATION RATE: 16 BRPM | DIASTOLIC BLOOD PRESSURE: 72 MMHG | HEART RATE: 91 BPM | OXYGEN SATURATION: 98 %

## 2023-08-24 DIAGNOSIS — Z79.899 HIGH RISK MEDICATION USE: Primary | ICD-10-CM

## 2023-08-24 DIAGNOSIS — M47.817 OSTEOARTHRITIS OF LUMBOSACRAL SPINE WITHOUT MYELOPATHY: ICD-10-CM

## 2023-08-24 DIAGNOSIS — M54.17 LUMBOSACRAL RADICULOPATHY: ICD-10-CM

## 2023-08-24 DIAGNOSIS — M54.16 LUMBAR RADICULITIS: ICD-10-CM

## 2023-08-24 DIAGNOSIS — M51.36 DEGENERATION OF INTERVERTEBRAL DISC OF LUMBAR REGION: ICD-10-CM

## 2023-08-24 DIAGNOSIS — M54.40 CHRONIC BILATERAL LOW BACK PAIN WITH SCIATICA, SCIATICA LATERALITY UNSPECIFIED: Primary | ICD-10-CM

## 2023-08-24 DIAGNOSIS — M46.1 SACROILIITIS: ICD-10-CM

## 2023-08-24 DIAGNOSIS — M79.7 FIBROMYOSITIS: ICD-10-CM

## 2023-08-24 DIAGNOSIS — M54.2 NECK PAIN: ICD-10-CM

## 2023-08-24 DIAGNOSIS — M48.062 SPINAL STENOSIS, LUMBAR REGION, WITH NEUROGENIC CLAUDICATION: ICD-10-CM

## 2023-08-24 DIAGNOSIS — Z79.899 OTHER LONG TERM (CURRENT) DRUG THERAPY: ICD-10-CM

## 2023-08-24 DIAGNOSIS — M25.552 HIP PAIN, LEFT: ICD-10-CM

## 2023-08-24 DIAGNOSIS — M70.61 GREATER TROCHANTERIC BURSITIS, RIGHT: ICD-10-CM

## 2023-08-24 DIAGNOSIS — M50.10 CERVICAL DISC DISORDER WITH RADICULOPATHY: ICD-10-CM

## 2023-08-24 DIAGNOSIS — G89.29 CHRONIC BILATERAL LOW BACK PAIN WITH SCIATICA, SCIATICA LATERALITY UNSPECIFIED: Primary | ICD-10-CM

## 2023-08-24 RX ORDER — GABAPENTIN 800 MG/1
800 TABLET ORAL 4 TIMES DAILY
Qty: 120 TABLET | Refills: 5 | Status: SHIPPED | OUTPATIENT
Start: 2023-08-24

## 2023-08-24 RX ORDER — OXYCODONE AND ACETAMINOPHEN 10; 325 MG/1; MG/1
1 TABLET ORAL EVERY 6 HOURS PRN
Qty: 120 TABLET | Refills: 0 | Status: SHIPPED | OUTPATIENT
Start: 2023-08-24

## 2023-08-24 NOTE — PROGRESS NOTES
Subjective   Tahira Ramirez is a 68 y.o. female.     History of Present Illness  Back/neck pain. Began years ago, 10/10 at worst, 5/10 at best, always present, varies, radiates to BUE and b/l hips, aching burning, stabbing, worse with all movement, interferes with ADLs, work, failed chiropractor, massage, course of PT in July 2017 and March 2018. Saw Dr. Meeks, as above, referred for cervical ESIs, also nonsurgical. MRI C-spine with multilevel DDD, DJD. Seen by Dr. Thompson in 2016. Denies anticoagulation, allergy to iodine or contrast, current infection or ABX. Had cervical TYRA with resolution of pain and numbness into BUE, then LESI x 2 with improvement. Rereferred for b/l L1-3 MBB and RFA by Dr. Meeks, had left then right RFA with tremendous relief. New severe muscle spasms in back, failed Baclofen 10mg BID, now 10mg TID. New pain in lower LBP improves with lumbar flexion, told her L TIFFANI is intact. New numbness in LLE in different areas including her genitals, no perianal numbness, no b/b incontinence, emergent new MRI L-spine w/o signs of cauda equina syndrome, unchanged from June 2021. Failed Norco 10mg and Gabapentin 600mg TID with PCP, cannot tolerate Gabapentin 800mg TID, referred for pain management. Being set up with [facility] and spine surgeon by PCP. HTN due to phentermine, being closely monitored by patient and PCP. Referred out by Dr. Camacho to Dr. Pritchard, may be having surgery but will likely be confined to a  after, she has a meeting in March 2023.     The following portions of the patient's history were reviewed and updated as appropriate: allergies, current medications, past family history, past medical history, past social history, past surgical history and problem list.    Review of Systems   Constitutional:  Negative for chills, fatigue and fever.   HENT:  Negative for hearing loss and trouble swallowing.    Eyes:  Negative for visual disturbance.   Respiratory:  Negative for shortness of breath.     Cardiovascular:  Negative for chest pain.   Gastrointestinal:  Negative for abdominal pain, constipation, diarrhea, nausea and vomiting.   Genitourinary:  Negative for urinary incontinence.   Musculoskeletal:  Positive for back pain and myalgias. Negative for arthralgias, joint swelling and neck pain.   Neurological:  Negative for dizziness, weakness, numbness and headache.     Objective   Physical Exam   Constitutional: She is oriented to person, place, and time. She appears well-developed and well-nourished.   HENT:   Head: Normocephalic and atraumatic.   Eyes: Pupils are equal, round, and reactive to light.   Cardiovascular: Normal rate, regular rhythm and normal heart sounds.   Pulmonary/Chest: Effort normal and breath sounds normal.   Abdominal: Soft. Normal appearance and bowel sounds are normal. She exhibits no distension. There is no abdominal tenderness.   Musculoskeletal:      Comments: (=) L NOMI and FADIR, mildly TTP b/l SIJ, strongly (+) b/l lumbar facet loading   Neurological: She is alert and oriented to person, place, and time. She has normal reflexes. She displays normal reflexes. No sensory deficit.   Normoreflexic, but 4/5 strength globally in LLE   Psychiatric: Her behavior is normal. Mood, judgment and thought content normal.       Assessment & Plan   Diagnoses and all orders for this visit:    1. Chronic bilateral low back pain with sciatica, sciatica laterality unspecified (Primary)    2. Neck pain    3. Cervical disc disorder with radiculopathy    4. Degeneration of intervertebral disc of lumbar region    5. Fibromyositis    6. Greater trochanteric bursitis, right    7. Hip pain, left    8. Lumbar radiculitis    9. Lumbosacral radiculopathy    10. Osteoarthritis of lumbosacral spine without myelopathy    11. Other long term (current) drug therapy    12. Sacroiliitis    13. Spinal stenosis, lumbar region, with neurogenic claudication      Received MRI L-spine from [facility]  for LESIs.  Had 1st cervical TYRA, then 2 ILESI.  Performed 2 b/l L1-3 MBB per Dr. Meeks, 100% relief with both.  Performed left then right L1-3 RFA. Tremendous relief, still helping.  Performed 2 b/l L5/S1 MBB for diagnostic purposes,   Performed left L5/S1 RFA, performed on right,   Performed b/l SIJ injection.  Increased to Baclofen 20mg TID. Could no longer get Robaxin.  Began Lidoderm q12h prn.  Discussed repeat LESIs vs TFESIs, performed left L4 & L5 TFESI with 1 day relief only, will not repeat.  Began Medrol Dose Pack.  Reviewed MRI L-spine results with patient.  New MRI L-spine reviewed, essentially nonsurgical per surgeon as surgery would be too extensive.  Patient presented to ED for work up including new MRI L-spine, r/o cauda equina syndrome, no change from June 2021, no signs c/w cauda equina syndrome.  Discussed risks and benefits of opioid treatment for chonic pain with patient, including expectations related to prescription requests, alternative modalities to opioids for managing pain, her treatment plan, risks of dependency and addiction, and safe storage practices for prescribed opioids, as well as proper and improper disposal of all medications.  UDS in order 8/18/22.  Treatment plan will consist of continuing current medication as long as it remains effective and is necessary, while evaluating patient at each visit and determining if the medication can be lowered or discontinued, while also using nonopioid therapies to reduce reliance on opioids.  Began Percocet 10mg QID prn, failed Norco 10mg QID prn, working well.  Stopped Gabapentin, insurance would not cover Lyrica 150mg BID, restarted Gabapentin 800mg QID.  RTC in 3 months for f/u.    INSPECT REPORT     As part of the patient's treatment plan, I am prescribing controlled substances. The patient has been made aware of appropriate use of such medications, including potential risk of somnolence, limited ability to drive and/or work safely, and  the potential for dependence or overdose. It has also bee made clear that these medications are for use by this patient only, without concomitant use of alcohol or other substances unless prescribed.      Patient has completed prescribing agreement detailing terms of continued prescribing of controlled substances, including monitoring INSPECT reports, urine drug screening, and pill counts if necessary. The patient is aware that inappropriate use will results in cessation of prescribing such medications.     INSPECT report has been reviewed and scanned into the patient's chart.     As the clinician, I personally reviewed the INSPECT while the patient was in the office today.     History and physical exam exhibit continued safe and appropriate use of controlled substances.      ADD: Percocet denied, needs to fail 1) non-drug treatment such as PT, massage, SCS for 6 weeks within past 2 years, and 2) two non-opioids meds within past year such as baclofen and gabapentin, which she has. Ordered PT, pt has not started, prefers to just use Good[facility] until she begins Medicare.

## 2023-09-01 DIAGNOSIS — M54.40 CHRONIC BILATERAL LOW BACK PAIN WITH SCIATICA, SCIATICA LATERALITY UNSPECIFIED: ICD-10-CM

## 2023-09-01 DIAGNOSIS — G89.29 CHRONIC BILATERAL LOW BACK PAIN WITH SCIATICA, SCIATICA LATERALITY UNSPECIFIED: ICD-10-CM

## 2023-09-01 RX ORDER — GABAPENTIN 800 MG/1
TABLET ORAL
Qty: 120 TABLET | Refills: 5 | OUTPATIENT
Start: 2023-09-01

## 2023-09-25 DIAGNOSIS — M54.40 CHRONIC BILATERAL LOW BACK PAIN WITH SCIATICA, SCIATICA LATERALITY UNSPECIFIED: ICD-10-CM

## 2023-09-25 DIAGNOSIS — G89.29 CHRONIC BILATERAL LOW BACK PAIN WITH SCIATICA, SCIATICA LATERALITY UNSPECIFIED: ICD-10-CM

## 2023-09-25 RX ORDER — OXYCODONE AND ACETAMINOPHEN 10; 325 MG/1; MG/1
1 TABLET ORAL EVERY 6 HOURS PRN
Qty: 120 TABLET | Refills: 0 | Status: SHIPPED | OUTPATIENT
Start: 2023-09-25

## 2023-09-25 NOTE — TELEPHONE ENCOUNTER
Caller: Tahira Ramirez    Relationship: Self    Best call back number:[phone]    Requested Prescriptions:   Requested Prescriptions     Pending Prescriptions Disp Refills    oxyCODONE-acetaminophen (Percocet)  MG per tablet 120 tablet 0     Sig: Take 1 tablet by mouth Every 6 (Six) Hours As Needed for Moderate Pain.        Pharmacy where request should be sent:  [facility]     Last office visit with prescribing clinician: 8/24/2023   Last telemedicine visit with prescribing clinician: Visit date not found   Next office visit with prescribing clinician: 11/17/2023         Does the patient have less than a 3 day supply:  [x] Yes  [] No    Would you like a call back once the refill request has been completed: [x] Yes [] No    If the office needs to give you a call back, can they leave a voicemail: [x] Yes [] No    Chantal Hill Rep   09/25/23 08:03 EDT

## 2023-10-23 ENCOUNTER — TELEPHONE (OUTPATIENT)
Dept: PAIN MEDICINE | Facility: CLINIC | Age: 68
End: 2023-10-23

## 2023-10-23 DIAGNOSIS — M54.40 CHRONIC BILATERAL LOW BACK PAIN WITH SCIATICA, SCIATICA LATERALITY UNSPECIFIED: ICD-10-CM

## 2023-10-23 DIAGNOSIS — G89.29 CHRONIC BILATERAL LOW BACK PAIN WITH SCIATICA, SCIATICA LATERALITY UNSPECIFIED: ICD-10-CM

## 2023-10-23 RX ORDER — OXYCODONE AND ACETAMINOPHEN 10; 325 MG/1; MG/1
1 TABLET ORAL EVERY 6 HOURS PRN
Qty: 120 TABLET | Refills: 0 | OUTPATIENT
Start: 2023-10-23

## 2023-10-23 NOTE — TELEPHONE ENCOUNTER
Caller: Tahira Ramirez    Relationship: Self    Best call back number: [phone]    Requested Prescriptions: PERCOCET 10-325MG  Requested Prescriptions      No prescriptions requested or ordered in this encounter        Pharmacy where request should be sent:  [facility]    Last office visit with prescribing clinician: 8/24/2023     Additional details provided by patient: PATIENT WILL BE OUT OF MEDICATION TOMORROW    Does the patient have less than a 3 day supply:  [x] Yes  [] No

## 2023-10-24 NOTE — TELEPHONE ENCOUNTER
PATIENT CALLED TO CANCEL THIS MESSAGE. PATIENT FOUND OUT SHE HAS A REFILL AT [facility] AND DOES NOT NEED TO SWITCH PHARMACY AT THIS TIME. SHE WILL DO THAT LATER.

## 2023-11-17 ENCOUNTER — OFFICE VISIT (OUTPATIENT)
Dept: PAIN MEDICINE | Facility: CLINIC | Age: 68
End: 2023-11-17
Payer: MEDICARE

## 2023-11-17 VITALS
RESPIRATION RATE: 16 BRPM | DIASTOLIC BLOOD PRESSURE: 82 MMHG | SYSTOLIC BLOOD PRESSURE: 142 MMHG | HEART RATE: 105 BPM | OXYGEN SATURATION: 98 %

## 2023-11-17 DIAGNOSIS — M54.16 LUMBAR RADICULITIS: ICD-10-CM

## 2023-11-17 DIAGNOSIS — M46.1 SACROILIITIS: ICD-10-CM

## 2023-11-17 DIAGNOSIS — M50.10 CERVICAL DISC DISORDER WITH RADICULOPATHY: ICD-10-CM

## 2023-11-17 DIAGNOSIS — M54.40 CHRONIC BILATERAL LOW BACK PAIN WITH SCIATICA, SCIATICA LATERALITY UNSPECIFIED: Primary | ICD-10-CM

## 2023-11-17 DIAGNOSIS — M47.817 OSTEOARTHRITIS OF LUMBOSACRAL SPINE WITHOUT MYELOPATHY: ICD-10-CM

## 2023-11-17 DIAGNOSIS — M48.062 SPINAL STENOSIS, LUMBAR REGION, WITH NEUROGENIC CLAUDICATION: ICD-10-CM

## 2023-11-17 DIAGNOSIS — M54.17 LUMBOSACRAL RADICULOPATHY: ICD-10-CM

## 2023-11-17 DIAGNOSIS — Z79.899 OTHER LONG TERM (CURRENT) DRUG THERAPY: ICD-10-CM

## 2023-11-17 DIAGNOSIS — M70.61 GREATER TROCHANTERIC BURSITIS, RIGHT: ICD-10-CM

## 2023-11-17 DIAGNOSIS — M54.2 NECK PAIN: ICD-10-CM

## 2023-11-17 DIAGNOSIS — M51.36 DEGENERATION OF INTERVERTEBRAL DISC OF LUMBAR REGION: ICD-10-CM

## 2023-11-17 DIAGNOSIS — M79.7 FIBROMYOSITIS: ICD-10-CM

## 2023-11-17 DIAGNOSIS — M25.552 HIP PAIN, LEFT: ICD-10-CM

## 2023-11-17 DIAGNOSIS — G89.29 CHRONIC BILATERAL LOW BACK PAIN WITH SCIATICA, SCIATICA LATERALITY UNSPECIFIED: Primary | ICD-10-CM

## 2023-11-17 RX ORDER — OXYCODONE AND ACETAMINOPHEN 10; 325 MG/1; MG/1
1 TABLET ORAL EVERY 6 HOURS PRN
Qty: 120 TABLET | Refills: 0 | Status: SHIPPED | OUTPATIENT
Start: 2023-11-17

## 2023-11-17 RX ORDER — GABAPENTIN 800 MG/1
800 TABLET ORAL 4 TIMES DAILY
Qty: 120 TABLET | Refills: 5 | Status: SHIPPED | OUTPATIENT
Start: 2023-11-17

## 2023-11-17 RX ORDER — BACLOFEN 20 MG/1
20 TABLET ORAL 3 TIMES DAILY
Qty: 90 TABLET | Refills: 5 | Status: SHIPPED | OUTPATIENT
Start: 2023-11-17

## 2023-11-17 NOTE — PROGRESS NOTES
Subjective   Tahira Ramirez is a 68 y.o. female.     History of Present Illness  Back/neck pain. Began years ago, 10/10 at worst, 5/10 at best, always present, varies, radiates to BUE and b/l hips, aching burning, stabbing, worse with all movement, interferes with ADLs, work, failed chiropractor, massage, course of PT in July 2017 and March 2018. Saw Dr. Meeks, as above, referred for cervical ESIs, also nonsurgical. MRI C-spine with multilevel DDD, DJD. Seen by Dr. Thompson in 2016. Denies anticoagulation, allergy to iodine or contrast, current infection or ABX. Had cervical TYRA with resolution of pain and numbness into BUE, then LESI x 2 with improvement. Rereferred for b/l L1-3 MBB and RFA by Dr. Meeks, had left then right RFA with tremendous relief. New severe muscle spasms in back, failed Baclofen 10mg BID, now 10mg TID. New pain in lower LBP improves with lumbar flexion, told her L TIFFANI is intact. New numbness in LLE in different areas including her genitals, no perianal numbness, no b/b incontinence, emergent new MRI L-spine w/o signs of cauda equina syndrome, unchanged from June 2021. Failed Norco 10mg and Gabapentin 600mg TID with PCP, cannot tolerate Gabapentin 800mg TID, referred for pain management. Being set up with [facility] and spine surgeon by PCP. HTN due to phentermine, being closely monitored by patient and PCP. Referred out by Dr. Camacho to Dr. Pritchard, may be having surgery but will likely be confined to a  after, she has a meeting in March 2023.  Back Pain  Pertinent negatives include no abdominal pain, bladder incontinence, chest pain, fever, numbness or weakness.        The following portions of the patient's history were reviewed and updated as appropriate: allergies, current medications, past family history, past medical history, past social history, past surgical history and problem list.    Review of Systems   Constitutional:  Negative for chills, fatigue and fever.   HENT:  Negative for hearing  loss and trouble swallowing.    Eyes:  Negative for visual disturbance.   Respiratory:  Negative for shortness of breath.    Cardiovascular:  Negative for chest pain.   Gastrointestinal:  Negative for abdominal pain, constipation, diarrhea, nausea and vomiting.   Genitourinary:  Negative for urinary incontinence.   Musculoskeletal:  Positive for back pain and myalgias. Negative for arthralgias, joint swelling and neck pain.   Neurological:  Negative for dizziness, weakness, numbness and headache.       Objective   Physical Exam   Constitutional: She is oriented to person, place, and time. She appears well-developed and well-nourished.   HENT:   Head: Normocephalic and atraumatic.   Eyes: Pupils are equal, round, and reactive to light.   Cardiovascular: Normal rate, regular rhythm and normal heart sounds.   Pulmonary/Chest: Effort normal and breath sounds normal.   Abdominal: Soft. Normal appearance and bowel sounds are normal. She exhibits no distension. There is no abdominal tenderness.   Musculoskeletal:      Comments: (=) L NOMI and FADIR, mildly TTP b/l SIJ, strongly (+) b/l lumbar facet loading   Neurological: She is alert and oriented to person, place, and time. She has normal reflexes. She displays normal reflexes. No sensory deficit.   Normoreflexic, but 4/5 strength globally in LLE   Psychiatric: Her behavior is normal. Mood, judgment and thought content normal.         Assessment & Plan   Diagnoses and all orders for this visit:    1. Chronic bilateral low back pain with sciatica, sciatica laterality unspecified (Primary)    2. Neck pain    3. Lumbosacral radiculopathy    4. Lumbar radiculitis    5. Hip pain, left    6. Greater trochanteric bursitis, right    7. Fibromyositis    8. Degeneration of intervertebral disc of lumbar region    9. Cervical disc disorder with radiculopathy    10. Osteoarthritis of lumbosacral spine without myelopathy    11. Other long term (current) drug therapy    12.  Sacroiliitis    13. Spinal stenosis, lumbar region, with neurogenic claudication      Received MRI L-spine from [facility], appropriate for LESIs.  Had 1st cervical TYRA, then 2 ILESI.  Performed 2 b/l L1-3 MBB per Dr. Meeks, 100% relief with both.  Performed left then right L1-3 RFA. Tremendous relief, still helping.  Performed 2 b/l L5/S1 MBB for diagnostic purposes,   Performed left L5/S1 RFA, performed on right,   Performed b/l SIJ injection.  Increased to Baclofen 20mg TID. Could no longer get Robaxin.  Began Lidoderm q12h prn.  Discussed repeat LESIs vs TFESIs, performed left L4 & L5 TFESI with 1 day relief only, will not repeat.  Began Medrol Dose Pack.  Reviewed MRI L-spine results with patient.  New MRI L-spine reviewed, essentially nonsurgical per surgeon as surgery would be too extensive.  Patient presented to ED for work up including new MRI L-spine, r/o cauda equina syndrome, no change from June 2021, no signs c/w cauda equina syndrome.  Discussed risks and benefits of opioid treatment for chonic pain with patient, including expectations related to prescription requests, alternative modalities to opioids for managing pain, her treatment plan, risks of dependency and addiction, and safe storage practices for prescribed opioids, as well as proper and improper disposal of all medications.  UDS in order 8/24/23.  Treatment plan will consist of continuing current medication as long as it remains effective and is necessary, while evaluating patient at each visit and determining if the medication can be lowered or discontinued, while also using nonopioid therapies to reduce reliance on opioids.  Began Percocet 10mg QID prn, failed Norco 10mg QID prn, working well. Filled 10/24/23.  Stopped Gabapentin, insurance would not cover Lyrica 150mg BID, restarted Gabapentin 800mg QID.  RTC in 3 months for f/u.    INSPECT REPORT     As part of the patient's treatment plan, I am prescribing controlled substances. The  patient has been made aware of appropriate use of such medications, including potential risk of somnolence, limited ability to drive and/or work safely, and the potential for dependence or overdose. It has also bee made clear that these medications are for use by this patient only, without concomitant use of alcohol or other substances unless prescribed.      Patient has completed prescribing agreement detailing terms of continued prescribing of controlled substances, including monitoring INSPECT reports, urine drug screening, and pill counts if necessary. The patient is aware that inappropriate use will results in cessation of prescribing such medications.     INSPECT report has been reviewed and scanned into the patient's chart.     As the clinician, I personally reviewed the INSPECT while the patient was in the office today.     History and physical exam exhibit continued safe and appropriate use of controlled substances.      ADD: Percocet denied, needs to fail 1) non-drug treatment such as PT, massage, SCS for 6 weeks within past 2 years, and 2) two non-opioids meds within past year such as baclofen and gabapentin, which she has. Ordered PT, pt has not started, prefers to just use GoodRx until she begins Medicare.

## 2024-02-09 ENCOUNTER — OFFICE VISIT (OUTPATIENT)
Dept: PAIN MEDICINE | Facility: CLINIC | Age: 69
End: 2024-02-09
Payer: MEDICARE

## 2024-02-09 VITALS
OXYGEN SATURATION: 97 % | RESPIRATION RATE: 16 BRPM | DIASTOLIC BLOOD PRESSURE: 79 MMHG | SYSTOLIC BLOOD PRESSURE: 157 MMHG | BODY MASS INDEX: 28.98 KG/M2 | WEIGHT: 185 LBS | HEART RATE: 89 BPM

## 2024-02-09 DIAGNOSIS — M50.10 CERVICAL DISC DISORDER WITH RADICULOPATHY: ICD-10-CM

## 2024-02-09 DIAGNOSIS — M48.062 SPINAL STENOSIS, LUMBAR REGION, WITH NEUROGENIC CLAUDICATION: ICD-10-CM

## 2024-02-09 DIAGNOSIS — M54.17 LUMBOSACRAL RADICULOPATHY: ICD-10-CM

## 2024-02-09 DIAGNOSIS — M70.61 GREATER TROCHANTERIC BURSITIS, RIGHT: ICD-10-CM

## 2024-02-09 DIAGNOSIS — M25.552 HIP PAIN, LEFT: ICD-10-CM

## 2024-02-09 DIAGNOSIS — M54.2 NECK PAIN: ICD-10-CM

## 2024-02-09 DIAGNOSIS — G89.29 CHRONIC BILATERAL LOW BACK PAIN WITH SCIATICA, SCIATICA LATERALITY UNSPECIFIED: Primary | ICD-10-CM

## 2024-02-09 DIAGNOSIS — M46.1 SACROILIITIS: ICD-10-CM

## 2024-02-09 DIAGNOSIS — M47.817 OSTEOARTHRITIS OF LUMBOSACRAL SPINE WITHOUT MYELOPATHY: ICD-10-CM

## 2024-02-09 DIAGNOSIS — M51.36 DEGENERATION OF INTERVERTEBRAL DISC OF LUMBAR REGION: ICD-10-CM

## 2024-02-09 DIAGNOSIS — M54.16 LUMBAR RADICULITIS: ICD-10-CM

## 2024-02-09 DIAGNOSIS — Z79.899 OTHER LONG TERM (CURRENT) DRUG THERAPY: ICD-10-CM

## 2024-02-09 DIAGNOSIS — M54.40 CHRONIC BILATERAL LOW BACK PAIN WITH SCIATICA, SCIATICA LATERALITY UNSPECIFIED: Primary | ICD-10-CM

## 2024-02-09 RX ORDER — OXYCODONE HYDROCHLORIDE 15 MG/1
15 TABLET ORAL EVERY 6 HOURS PRN
Qty: 120 TABLET | Refills: 0 | Status: SHIPPED | OUTPATIENT
Start: 2024-02-09

## 2024-02-09 RX ORDER — GABAPENTIN 800 MG/1
800 TABLET ORAL 4 TIMES DAILY
Qty: 120 TABLET | Refills: 5 | Status: SHIPPED | OUTPATIENT
Start: 2024-02-09

## 2024-02-09 NOTE — PROGRESS NOTES
Subjective   Tahira Ramirez is a 68 y.o. female.     History of Present Illness  Back/neck pain. Began years ago, 10/10 at worst, 5/10 at best, always present, varies, radiates to BUE and b/l hips, aching burning, stabbing, worse with all movement, interferes with ADLs, work, failed chiropractor, massage, course of PT in July 2017 and March 2018. Saw Dr. Meeks, as above, referred for cervical ESIs, also nonsurgical. MRI C-spine with multilevel DDD, DJD. Seen by Dr. Thompson in 2016. Denies anticoagulation, allergy to iodine or contrast, current infection or ABX. Had cervical TYRA with resolution of pain and numbness into BUE, then LESI x 2 with improvement. Rereferred for b/l L1-3 MBB and RFA by Dr. Meeks, had left then right RFA with tremendous relief. New severe muscle spasms in back, failed Baclofen 10mg BID, now 10mg TID. New pain in lower LBP improves with lumbar flexion, told her L TIFFANI is intact. New numbness in LLE in different areas including her genitals, no perianal numbness, no b/b incontinence, emergent new MRI L-spine w/o signs of cauda equina syndrome, unchanged from June 2021. Failed Norco 10mg and Gabapentin 600mg TID with PCP, cannot tolerate Gabapentin 800mg TID, referred for pain management. Being set up with [facility] and spine surgeon by PCP. HTN due to phentermine, being closely monitored by patient and PCP. Referred out by Dr. Camacho to Dr. Pritchard, may be having surgery but will likely be confined to a  after. Taking Tylenol to help control pain, highly elevated LFTs with PCP and CKD with nephrology.  Back Pain  Pertinent negatives include no abdominal pain, bladder incontinence, chest pain, fever, numbness or weakness.        The following portions of the patient's history were reviewed and updated as appropriate: allergies, current medications, past family history, past medical history, past social history, past surgical history and problem list.    Review of Systems   Constitutional:  Negative  for chills, fatigue and fever.   HENT:  Negative for hearing loss and trouble swallowing.    Eyes:  Negative for visual disturbance.   Respiratory:  Negative for shortness of breath.    Cardiovascular:  Negative for chest pain.   Gastrointestinal:  Negative for abdominal pain, constipation, diarrhea, nausea and vomiting.   Genitourinary:  Negative for urinary incontinence.   Musculoskeletal:  Positive for back pain and myalgias. Negative for arthralgias, joint swelling and neck pain.   Neurological:  Negative for dizziness, weakness, numbness and headache.       Objective   Physical Exam   Constitutional: She is oriented to person, place, and time. She appears well-developed and well-nourished.   HENT:   Head: Normocephalic and atraumatic.   Eyes: Pupils are equal, round, and reactive to light.   Cardiovascular: Normal rate, regular rhythm and normal heart sounds.   Pulmonary/Chest: Effort normal and breath sounds normal.   Abdominal: Soft. Normal appearance and bowel sounds are normal. She exhibits no distension. There is no abdominal tenderness.   Musculoskeletal:      Comments: (=) L NOMI and FADIR, mildly TTP b/l SIJ, strongly (+) b/l lumbar facet loading   Neurological: She is alert and oriented to person, place, and time. She has normal reflexes. She displays normal reflexes. No sensory deficit.   Normoreflexic, but 4/5 strength globally in LLE   Psychiatric: Her behavior is normal. Mood, judgment and thought content normal.         Assessment & Plan   Diagnoses and all orders for this visit:    1. Chronic bilateral low back pain with sciatica, sciatica laterality unspecified (Primary)    2. Neck pain    3. Cervical disc disorder with radiculopathy    4. Degeneration of intervertebral disc of lumbar region    5. Greater trochanteric bursitis, right    6. Hip pain, left    7. Lumbar radiculitis    8. Lumbosacral radiculopathy    9. Osteoarthritis of lumbosacral spine without myelopathy    10. Other long term  (current) drug therapy    11. Sacroiliitis    12. Spinal stenosis, lumbar region, with neurogenic claudication      Received MRI L[facility]spine from [facility], appropriate for LESIs.  Had 1st cervical TYRA, then 2 ILESI.  Performed 2 b/l L1-3 MBB per Dr. Meeks, 100% relief with both.  Performed left then right L1-3 RFA. Tremendous relief, still helping.  Performed 2 b/l L5/S1 MBB for diagnostic purposes,   Performed left L5/S1 RFA, performed on right,   Performed b/l SIJ injection.  Increased to Baclofen 20mg TID. Could no longer get Robaxin.  Began Lidoderm q12h prn.  Discussed repeat LESIs vs TFESIs, performed left L4 & L5 TFESI with 1 day relief only, will not repeat.  Began Medrol Dose Pack.  Reviewed MRI L-spine results with patient.  New MRI L-spine reviewed, essentially nonsurgical per surgeon as surgery would be too extensive.  Patient presented to ED for work up including new MRI L-spine, r/o cauda equina syndrome, no change from June 2021, no signs c/w cauda equina syndrome.  Discussed risks and benefits of opioid treatment for chonic pain with patient, including expectations related to prescription requests, alternative modalities to opioids for managing pain, her treatment plan, risks of dependency and addiction, and safe storage practices for prescribed opioids, as well as proper and improper disposal of all medications.  UDS in order 8/24/23.  Treatment plan will consist of continuing current medication as long as it remains effective and is necessary, while evaluating patient at each visit and determining if the medication can be lowered or discontinued, while also using nonopioid therapies to reduce reliance on opioids.  Began Percocet 10mg QID prn, failed Norco 10mg QID prn, but highly elevated LFTs and CKD, switch to Oxycodone 15mg QID prn to eliminate APAP and reduce need for extra APAP. Filled 1/22/24.  Stopped Gabapentin, insurance would not cover Lyrica 150mg BID and did not help in past, also has  CKD, restarted Gabapentin 800mg QID.  RTC in 3 months for f/u.    INSPECT REPORT     As part of the patient's treatment plan, I am prescribing controlled substances. The patient has been made aware of appropriate use of such medications, including potential risk of somnolence, limited ability to drive and/or work safely, and the potential for dependence or overdose. It has also bee made clear that these medications are for use by this patient only, without concomitant use of alcohol or other substances unless prescribed.      Patient has completed prescribing agreement detailing terms of continued prescribing of controlled substances, including monitoring INSPECT reports, urine drug screening, and pill counts if necessary. The patient is aware that inappropriate use will results in cessation of prescribing such medications.     INSPECT report has been reviewed and scanned into the patient's chart.     As the clinician, I personally reviewed the INSPECT while the patient was in the office today.     History and physical exam exhibit continued safe and appropriate use of controlled substances.      ADD: Percocet denied, needs to fail 1) non-drug treatment such as PT, massage, SCS for 6 weeks within past 2 years, and 2) two non-opioids meds within past year such as baclofen and gabapentin, which she has. Ordered PT, pt has not started, prefers to just use GoodRx until she begins Medicare.                     Physical Exam  Constitutional:       Appearance: Normal appearance. She is well-developed and well-nourished.   HENT:      Head: Normocephalic and atraumatic.   Eyes:      Pupils: Pupils are equal, round, and reactive to light.   Cardiovascular:      Rate and Rhythm: Normal rate and regular rhythm.      Heart sounds: Normal heart sounds.   Pulmonary:      Effort: Pulmonary effort is normal.      Breath sounds: Normal breath sounds.   Abdominal:      General: Bowel sounds are normal. There is no distension.       Palpations: Abdomen is soft.      Tenderness: There is no abdominal tenderness.   Musculoskeletal:      Comments: (=) L NOMI and FADIR, mildly TTP b/l SIJ, strongly (+) b/l lumbar facet loading   Neurological:      Mental Status: She is alert and oriented to person, place, and time.      Sensory: No sensory deficit.      Deep Tendon Reflexes: Reflexes are normal and symmetric. Reflexes normal.      Comments: Normoreflexic, but 4/5 strength globally in LLE   Psychiatric:         Mood and Affect: Mood normal.         Behavior: Behavior normal.         Thought Content: Thought content normal.         Judgment: Judgment normal.

## 2024-02-21 DIAGNOSIS — M54.40 CHRONIC BILATERAL LOW BACK PAIN WITH SCIATICA, SCIATICA LATERALITY UNSPECIFIED: ICD-10-CM

## 2024-02-21 DIAGNOSIS — G89.29 CHRONIC BILATERAL LOW BACK PAIN WITH SCIATICA, SCIATICA LATERALITY UNSPECIFIED: ICD-10-CM

## 2024-02-21 RX ORDER — OXYCODONE AND ACETAMINOPHEN 10; 325 MG/1; MG/1
1 TABLET ORAL EVERY 6 HOURS PRN
Qty: 120 TABLET | Refills: 0 | OUTPATIENT
Start: 2024-02-21

## 2024-03-04 RX ORDER — BACLOFEN 20 MG/1
20 TABLET ORAL 3 TIMES DAILY
Qty: 90 TABLET | Refills: 5 | OUTPATIENT
Start: 2024-03-04

## 2024-03-05 ENCOUNTER — TELEPHONE (OUTPATIENT)
Dept: PAIN MEDICINE | Facility: CLINIC | Age: 69
End: 2024-03-05

## 2024-03-05 DIAGNOSIS — G89.29 CHRONIC BILATERAL LOW BACK PAIN WITH SCIATICA, SCIATICA LATERALITY UNSPECIFIED: ICD-10-CM

## 2024-03-05 DIAGNOSIS — M54.40 CHRONIC BILATERAL LOW BACK PAIN WITH SCIATICA, SCIATICA LATERALITY UNSPECIFIED: ICD-10-CM

## 2024-03-05 RX ORDER — BACLOFEN 20 MG/1
20 TABLET ORAL 3 TIMES DAILY
Qty: 90 TABLET | Refills: 11 | Status: SHIPPED | OUTPATIENT
Start: 2024-03-05

## 2024-03-05 RX ORDER — GABAPENTIN 800 MG/1
800 TABLET ORAL 4 TIMES DAILY
Qty: 120 TABLET | Refills: 5 | Status: SHIPPED | OUTPATIENT
Start: 2024-03-05

## 2024-03-05 NOTE — TELEPHONE ENCOUNTER
Caller: Tahira Ramirez MARIA C    Relationship: Self    Best call back number: [phone]    Requested Prescriptions: GABAPENTIN 800 MGS  Requested Prescriptions     Pending Prescriptions Disp Refills    baclofen (LIORESAL) 20 MG tablet 90 tablet 5     Sig: Take 1 tablet by mouth 3 (Three) Times a Day.        Pharmacy where request should be sent:  [facility]     Last office visit with prescribing clinician: 2/9/2024   Last telemedicine visit with prescribing clinician: Visit date not found   Next office visit with prescribing clinician: 5/17/2024     Additional details provided by patient: PATIENT STATES [facility] REQUESTED A REFILL FOR BACLOFEN LAST THURSDAY AND THE GABAPENTIN SUNDAY OR YESTERDAY AND THEY HAVE NOT HEARD BACK FROM THE OFFICE.      Does the patient have less than a 3 day supply:  [x] Yes  [] No    Would you like a call back once the refill request has been completed: [] Yes [x] No    If the office needs to give you a call back, can they leave a voicemail: [x] Yes [] No    Chantal Wray Rep   03/05/24 10:12 EST

## 2024-05-17 ENCOUNTER — OFFICE VISIT (OUTPATIENT)
Dept: PAIN MEDICINE | Facility: CLINIC | Age: 69
End: 2024-05-17
Payer: MEDICARE

## 2024-05-17 VITALS
BODY MASS INDEX: 27.02 KG/M2 | RESPIRATION RATE: 16 BRPM | SYSTOLIC BLOOD PRESSURE: 129 MMHG | OXYGEN SATURATION: 96 % | DIASTOLIC BLOOD PRESSURE: 77 MMHG | WEIGHT: 172.5 LBS | HEART RATE: 86 BPM

## 2024-05-17 DIAGNOSIS — M46.1 SACROILIITIS: ICD-10-CM

## 2024-05-17 DIAGNOSIS — M51.36 DEGENERATION OF INTERVERTEBRAL DISC OF LUMBAR REGION: ICD-10-CM

## 2024-05-17 DIAGNOSIS — M54.17 LUMBOSACRAL RADICULOPATHY: ICD-10-CM

## 2024-05-17 DIAGNOSIS — G89.29 CHRONIC BILATERAL LOW BACK PAIN WITH SCIATICA, SCIATICA LATERALITY UNSPECIFIED: Primary | ICD-10-CM

## 2024-05-17 DIAGNOSIS — M70.61 GREATER TROCHANTERIC BURSITIS, RIGHT: ICD-10-CM

## 2024-05-17 DIAGNOSIS — M25.552 HIP PAIN, LEFT: ICD-10-CM

## 2024-05-17 DIAGNOSIS — M50.10 CERVICAL DISC DISORDER WITH RADICULOPATHY: ICD-10-CM

## 2024-05-17 DIAGNOSIS — M48.062 SPINAL STENOSIS, LUMBAR REGION, WITH NEUROGENIC CLAUDICATION: ICD-10-CM

## 2024-05-17 DIAGNOSIS — M54.2 NECK PAIN: ICD-10-CM

## 2024-05-17 DIAGNOSIS — M54.40 CHRONIC BILATERAL LOW BACK PAIN WITH SCIATICA, SCIATICA LATERALITY UNSPECIFIED: Primary | ICD-10-CM

## 2024-05-17 DIAGNOSIS — M54.16 LUMBAR RADICULITIS: ICD-10-CM

## 2024-05-17 DIAGNOSIS — Z79.899 OTHER LONG TERM (CURRENT) DRUG THERAPY: ICD-10-CM

## 2024-05-17 DIAGNOSIS — M47.817 OSTEOARTHRITIS OF LUMBOSACRAL SPINE WITHOUT MYELOPATHY: ICD-10-CM

## 2024-05-17 RX ORDER — OXYCODONE HYDROCHLORIDE 15 MG/1
15 TABLET ORAL EVERY 6 HOURS PRN
Qty: 120 TABLET | Refills: 0 | Status: SHIPPED | OUTPATIENT
Start: 2024-05-17

## 2024-05-17 NOTE — PROGRESS NOTES
Subjective   Tahira Ramirez is a 68 y.o. female.     History of Present Illness  Back/neck pain. Began years ago, 10/10 at worst, 5/10 at best, always present, varies, radiates to BUE and b/l hips, aching burning, stabbing, worse with all movement, interferes with ADLs, work, failed chiropractor, massage, course of PT in July 2017 and March 2018. Saw Dr. Meeks, as above, referred for cervical ESIs, also nonsurgical. MRI C-spine with multilevel DDD, DJD. Seen by Dr. Thompson in 2016. Denies anticoagulation, allergy to iodine or contrast, current infection or ABX. Had cervical TYRA with resolution of pain and numbness into BUE, then LESI x 2 with improvement. Rereferred for b/l L1-3 MBB and RFA by Dr. Meeks, had left then right RFA with tremendous relief. New severe muscle spasms in back, failed Baclofen 10mg BID, now 10mg TID. New pain in lower LBP improves with lumbar flexion, told her L TIFFANI is intact. New numbness in LLE in different areas including her genitals, no perianal numbness, no b/b incontinence, emergent new MRI L-spine w/o signs of cauda equina syndrome, unchanged from June 2021. Failed Norco 10mg and Gabapentin 600mg TID with PCP, cannot tolerate Gabapentin 800mg TID, referred for pain management. Being set up with [facility] and spine surgeon by PCP. HTN due to phentermine, being closely monitored by patient and PCP. Referred out by Dr. Camacho to Dr. Pritchard, may be having surgery but will likely be confined to a  after. Taking Tylenol to help control pain, highly elevated LFTs with PCP and CKD with nephrology.  Back Pain  Pertinent negatives include no abdominal pain, bladder incontinence, chest pain, fever, numbness or weakness.        The following portions of the patient's history were reviewed and updated as appropriate: allergies, current medications, past family history, past medical history, past social history, past surgical history and problem list.    Review of Systems   Constitutional:  Negative  for chills, fatigue and fever.   HENT:  Negative for hearing loss and trouble swallowing.    Eyes:  Negative for visual disturbance.   Respiratory:  Negative for shortness of breath.    Cardiovascular:  Negative for chest pain.   Gastrointestinal:  Negative for abdominal pain, constipation, diarrhea, nausea and vomiting.   Genitourinary:  Negative for urinary incontinence.   Musculoskeletal:  Positive for back pain and myalgias. Negative for arthralgias, joint swelling and neck pain.   Neurological:  Negative for dizziness, weakness, numbness and headache.       Objective   Physical Exam   Constitutional: She is oriented to person, place, and time. She appears well-developed and well-nourished.   HENT:   Head: Normocephalic and atraumatic.   Eyes: Pupils are equal, round, and reactive to light.   Cardiovascular: Normal rate, regular rhythm and normal heart sounds.   Pulmonary/Chest: Effort normal and breath sounds normal.   Abdominal: Soft. Normal appearance and bowel sounds are normal. She exhibits no distension. There is no abdominal tenderness.   Musculoskeletal:      Comments: (=) L NOMI and FADIR, mildly TTP b/l SIJ, strongly (+) b/l lumbar facet loading   Neurological: She is alert and oriented to person, place, and time. She has normal reflexes. She displays normal reflexes. No sensory deficit.   Normoreflexic, but 4/5 strength globally in LLE   Psychiatric: Her behavior is normal. Mood, judgment and thought content normal.         Assessment & Plan   Diagnoses and all orders for this visit:    1. Chronic bilateral low back pain with sciatica, sciatica laterality unspecified (Primary)    2. Neck pain    3. Cervical disc disorder with radiculopathy    4. Degeneration of intervertebral disc of lumbar region    5. Greater trochanteric bursitis, right    6. Hip pain, left    7. Lumbar radiculitis    8. Lumbosacral radiculopathy    9. Osteoarthritis of lumbosacral spine without myelopathy    10. Other long term  (current) drug therapy    11. Sacroiliitis    12. Spinal stenosis, lumbar region, with neurogenic claudication      Received MRI L[facility]spine from [facility], appropriate for LESIs.  Had 1st cervical TYRA, then 2 ILESI.  Performed 2 b/l L1-3 MBB per Dr. Meeks, 100% relief with both.  Performed left then right L1-3 RFA. Tremendous relief, still helping.  Performed 2 b/l L5/S1 MBB for diagnostic purposes,   Performed left L5/S1 RFA, performed on right,   Performed b/l SIJ injection.  Increased to Baclofen 20mg TID. Could no longer get Robaxin.  Began Lidoderm q12h prn.  Discussed repeat LESIs vs TFESIs, performed left L4 & L5 TFESI with 1 day relief only, will not repeat.  Began Medrol Dose Pack.  Reviewed MRI L-spine results with patient.  New MRI L-spine reviewed, essentially nonsurgical per surgeon as surgery would be too extensive.  Patient presented to ED for work up including new MRI L-spine, r/o cauda equina syndrome, no change from June 2021, no signs c/w cauda equina syndrome.  Discussed risks and benefits of opioid treatment for chonic pain with patient, including expectations related to prescription requests, alternative modalities to opioids for managing pain, her treatment plan, risks of dependency and addiction, and safe storage practices for prescribed opioids, as well as proper and improper disposal of all medications.  UDS in order 8/24/23.  Treatment plan will consist of continuing current medication as long as it remains effective and is necessary, while evaluating patient at each visit and determining if the medication can be lowered or discontinued, while also using nonopioid therapies to reduce reliance on opioids.  Began Percocet 10mg QID prn, failed Norco 10mg QID prn, but highly elevated LFTs and CKD, switched to Oxycodone 15mg QID prn to eliminate APAP and reduce need for extra APAP. Filled 4/21/24.  Stopped Gabapentin, insurance would not cover Lyrica 150mg BID and did not help in past, also has  CKD, restarted Gabapentin 800mg QID.  RTC in 3 months for f/u.    INSPECT REPORT     As part of the patient's treatment plan, I am prescribing controlled substances. The patient has been made aware of appropriate use of such medications, including potential risk of somnolence, limited ability to drive and/or work safely, and the potential for dependence or overdose. It has also bee made clear that these medications are for use by this patient only, without concomitant use of alcohol or other substances unless prescribed.      Patient has completed prescribing agreement detailing terms of continued prescribing of controlled substances, including monitoring INSPECT reports, urine drug screening, and pill counts if necessary. The patient is aware that inappropriate use will results in cessation of prescribing such medications.     INSPECT report has been reviewed and scanned into the patient's chart.     As the clinician, I personally reviewed the INSPECT while the patient was in the office today.     History and physical exam exhibit continued safe and appropriate use of controlled substances.      ADD: Percocet denied, needs to fail 1) non-drug treatment such as PT, massage, SCS for 6 weeks within past 2 years, and 2) two non-opioids meds within past year such as baclofen and gabapentin, which she has. Ordered PT, pt has not started, prefers to just use GoodRx until she begins Medicare.                     Physical Exam  Constitutional:       Appearance: Normal appearance. She is well-developed and well-nourished.   HENT:      Head: Normocephalic and atraumatic.   Eyes:      Pupils: Pupils are equal, round, and reactive to light.   Cardiovascular:      Rate and Rhythm: Normal rate and regular rhythm.      Heart sounds: Normal heart sounds.   Pulmonary:      Effort: Pulmonary effort is normal.      Breath sounds: Normal breath sounds.   Abdominal:      General: Bowel sounds are normal. There is no distension.       Palpations: Abdomen is soft.      Tenderness: There is no abdominal tenderness.   Musculoskeletal:      Comments: (=) L NOMI and FADIR, mildly TTP b/l SIJ, strongly (+) b/l lumbar facet loading   Neurological:      Mental Status: She is alert and oriented to person, place, and time.      Sensory: No sensory deficit.      Deep Tendon Reflexes: Reflexes are normal and symmetric. Reflexes normal.      Comments: Normoreflexic, but 4/5 strength globally in LLE   Psychiatric:         Mood and Affect: Mood normal.         Behavior: Behavior normal.         Thought Content: Thought content normal.         Judgment: Judgment normal.

## 2024-08-09 ENCOUNTER — OFFICE VISIT (OUTPATIENT)
Dept: PAIN MEDICINE | Facility: CLINIC | Age: 69
End: 2024-08-09
Payer: MEDICARE

## 2024-08-09 VITALS
RESPIRATION RATE: 16 BRPM | BODY MASS INDEX: 25.84 KG/M2 | OXYGEN SATURATION: 95 % | DIASTOLIC BLOOD PRESSURE: 93 MMHG | HEART RATE: 88 BPM | WEIGHT: 165 LBS | SYSTOLIC BLOOD PRESSURE: 154 MMHG

## 2024-08-09 DIAGNOSIS — G89.29 CHRONIC BILATERAL LOW BACK PAIN WITH SCIATICA, SCIATICA LATERALITY UNSPECIFIED: Primary | ICD-10-CM

## 2024-08-09 DIAGNOSIS — M54.2 NECK PAIN: ICD-10-CM

## 2024-08-09 DIAGNOSIS — M54.40 CHRONIC BILATERAL LOW BACK PAIN WITH SCIATICA, SCIATICA LATERALITY UNSPECIFIED: Primary | ICD-10-CM

## 2024-08-09 DIAGNOSIS — Z79.899 OTHER LONG TERM (CURRENT) DRUG THERAPY: ICD-10-CM

## 2024-08-09 DIAGNOSIS — M48.062 SPINAL STENOSIS, LUMBAR REGION, WITH NEUROGENIC CLAUDICATION: ICD-10-CM

## 2024-08-09 DIAGNOSIS — M25.552 HIP PAIN, LEFT: ICD-10-CM

## 2024-08-09 DIAGNOSIS — M54.16 LUMBAR RADICULITIS: ICD-10-CM

## 2024-08-09 DIAGNOSIS — M46.1 SACROILIITIS: ICD-10-CM

## 2024-08-09 DIAGNOSIS — M51.36 DEGENERATION OF INTERVERTEBRAL DISC OF LUMBAR REGION: ICD-10-CM

## 2024-08-09 DIAGNOSIS — M50.10 CERVICAL DISC DISORDER WITH RADICULOPATHY: ICD-10-CM

## 2024-08-09 DIAGNOSIS — M54.17 LUMBOSACRAL RADICULOPATHY: ICD-10-CM

## 2024-08-09 DIAGNOSIS — M47.817 OSTEOARTHRITIS OF LUMBOSACRAL SPINE WITHOUT MYELOPATHY: ICD-10-CM

## 2024-08-09 DIAGNOSIS — M70.61 GREATER TROCHANTERIC BURSITIS, RIGHT: ICD-10-CM

## 2024-08-09 RX ORDER — OXYCODONE HYDROCHLORIDE 15 MG/1
15 TABLET ORAL EVERY 6 HOURS PRN
Qty: 120 TABLET | Refills: 0 | Status: SHIPPED | OUTPATIENT
Start: 2024-08-09

## 2024-08-09 RX ORDER — GABAPENTIN 800 MG/1
800 TABLET ORAL 4 TIMES DAILY
Qty: 120 TABLET | Refills: 5 | Status: SHIPPED | OUTPATIENT
Start: 2024-08-09

## 2024-08-09 NOTE — PROGRESS NOTES
Subjective   Tahira Ramirez is a 69 y.o. female.     History of Present Illness  Back/neck pain. Began years ago, 10/10 at worst, 5/10 at best, always present, varies, radiates to BUE and b/l hips, aching burning, stabbing, worse with all movement, interferes with ADLs, work, failed chiropractor, massage, course of PT in July 2017 and March 2018. Saw Dr. Meeks, as above, referred for cervical ESIs, also nonsurgical. MRI C-spine with multilevel DDD, DJD. Seen by Dr. Thompson in 2016. Denies anticoagulation, allergy to iodine or contrast, current infection or ABX. Had cervical TYRA with resolution of pain and numbness into BUE, then LESI x 2 with improvement. Rereferred for b/l L1-3 MBB and RFA by Dr. Meeks, had left then right RFA with tremendous relief. New severe muscle spasms in back, failed Baclofen 10mg BID, now 10mg TID. New pain in lower LBP improves with lumbar flexion, told her L TIFFANI is intact. New numbness in LLE in different areas including her genitals, no perianal numbness, no b/b incontinence, emergent new MRI L-spine w/o signs of cauda equina syndrome, unchanged from June 2021. Failed Norco 10mg and Gabapentin 600mg TID with PCP, cannot tolerate Gabapentin 800mg TID, referred for pain management. Being set up with [facility] and spine surgeon by PCP. HTN due to phentermine, being closely monitored by patient and PCP. Referred out by Dr. Camacho to Dr. Pritchard, may be having surgery but will likely be confined to a  after. Taking Tylenol to help control pain, highly elevated LFTs with PCP and CKD with nephrology.  Back Pain  Pertinent negatives include no abdominal pain, bladder incontinence, chest pain, fever, numbness or weakness.        The following portions of the patient's history were reviewed and updated as appropriate: allergies, current medications, past family history, past medical history, past social history, past surgical history and problem list.    Review of Systems   Constitutional:  Negative  for chills, fatigue and fever.   HENT:  Negative for hearing loss and trouble swallowing.    Eyes:  Negative for visual disturbance.   Respiratory:  Negative for shortness of breath.    Cardiovascular:  Negative for chest pain.   Gastrointestinal:  Negative for abdominal pain, constipation, diarrhea, nausea and vomiting.   Genitourinary:  Negative for urinary incontinence.   Musculoskeletal:  Positive for back pain and myalgias. Negative for arthralgias, joint swelling and neck pain.   Neurological:  Negative for dizziness, weakness, numbness and headache.       Objective   Physical Exam   Constitutional: She is oriented to person, place, and time. She appears well-developed and well-nourished.   HENT:   Head: Normocephalic and atraumatic.   Eyes: Pupils are equal, round, and reactive to light.   Cardiovascular: Normal rate, regular rhythm and normal heart sounds.   Pulmonary/Chest: Effort normal and breath sounds normal.   Abdominal: Soft. Normal appearance and bowel sounds are normal. She exhibits no distension. There is no abdominal tenderness.   Musculoskeletal:      Comments: (Equally positive) L NOMI and FADIR, mildly TTP b/l SIJ, strongly (+) b/l lumbar facet loading  Also (+) b/l NOMI, thigh thrust, Gaenslens     Neurological: She is alert and oriented to person, place, and time. She has normal reflexes. She displays normal reflexes. No sensory deficit.   Normoreflexic, but 4/5 strength globally in LLE   Psychiatric: Her behavior is normal. Mood, judgment and thought content normal.         Assessment & Plan   Diagnoses and all orders for this visit:    1. Chronic bilateral low back pain with sciatica, sciatica laterality unspecified (Primary)    2. Lumbar radiculitis    3. Lumbosacral radiculopathy    4. Neck pain    5. Osteoarthritis of lumbosacral spine without myelopathy    6. Other long term (current) drug therapy    7. Sacroiliitis    8. Spinal stenosis, lumbar region, with neurogenic  claudication    9. Hip pain, left    10. Greater trochanteric bursitis, right    11. Degeneration of intervertebral disc of lumbar region    12. Cervical disc disorder with radiculopathy      Received MRI L-spine from [facility], appropriate for LESIs.  Had 1st cervical TYRA, then 2 ILESI.  Performed 2 b/l L1-3 MBB per Dr. Meeks, 100% relief with both.  Performed left then right L1-3 RFA. Tremendous relief, still helping.  Performed 2 b/l L5/S1 MBB for diagnostic purposes,   Performed left L5/S1 RFA, performed on right,   Performed b/l SIJ injection with > 50% relief for > 3 months, schedule repeat..  Increased to Baclofen 20mg TID. Could no longer get Robaxin.  Began Lidoderm q12h prn.  Discussed repeat LESIs vs TFESIs, performed left L4 & L5 TFESI with 1 day relief only, will not repeat.  Began Medrol Dose Pack.  Reviewed MRI L-spine results with patient.  New MRI L-spine reviewed, essentially nonsurgical per surgeon as surgery would be too extensive.  Patient presented to ED for work up including new MRI L-spine, r/o cauda equina syndrome, no change from June 2021, no signs c/w cauda equina syndrome.  Discussed risks and benefits of opioid treatment for chonic pain with patient, including expectations related to prescription requests, alternative modalities to opioids for managing pain, her treatment plan, risks of dependency and addiction, and safe storage practices for prescribed opioids, as well as proper and improper disposal of all medications.  UDS in order 8/24/23.  Treatment plan will consist of continuing current medication as long as it remains effective and is necessary, while evaluating patient at each visit and determining if the medication can be lowered or discontinued, while also using nonopioid therapies to reduce reliance on opioids.  Began Percocet 10mg QID prn, failed Norco 10mg QID prn, but highly elevated LFTs and CKD, switched to Oxycodone 15mg QID prn to eliminate APAP and reduce need for  extra APAP. Filled 7/20/24.  Patient's symptoms are still adequately managed by current medication regimen, is doing well at this strength and dosage, therefore I will continue to prescribe unchanged as the most appropriate course of treatment.  Stopped Gabapentin, insurance would not cover Lyrica 150mg BID and did not help in past, also has CKD, restarted Gabapentin 800mg QID.  RTC for b/l SIJ then in 3 months for f/u.    INSPECT REPORT     As part of the patient's treatment plan, I am prescribing controlled substances. The patient has been made aware of appropriate use of such medications, including potential risk of somnolence, limited ability to drive and/or work safely, and the potential for dependence or overdose. It has also bee made clear that these medications are for use by this patient only, without concomitant use of alcohol or other substances unless prescribed.      Patient has completed prescribing agreement detailing terms of continued prescribing of controlled substances, including monitoring INSPECT reports, urine drug screening, and pill counts if necessary. The patient is aware that inappropriate use will results in cessation of prescribing such medications.     INSPECT report has been reviewed and scanned into the patient's chart.     As the clinician, I personally reviewed the INSPECT while the patient was in the office today.     History and physical exam exhibit continued safe and appropriate use of controlled substances.      ADD: Percocet denied, needs to fail 1) non-drug treatment such as PT, massage, SCS for 6 weeks within past 2 years, and 2) two non-opioids meds within past year such as baclofen and gabapentin, which she has. Ordered PT, pt has not started, prefers to just use GoodRx until she begins Medicare.                     Physical Exam  Constitutional:       Appearance: Normal appearance. She is well-developed and well-nourished.   HENT:      Head: Normocephalic and atraumatic.    Eyes:      Pupils: Pupils are equal, round, and reactive to light.   Cardiovascular:      Rate and Rhythm: Normal rate and regular rhythm.      Heart sounds: Normal heart sounds.   Pulmonary:      Effort: Pulmonary effort is normal.      Breath sounds: Normal breath sounds.   Abdominal:      General: Bowel sounds are normal. There is no distension.      Palpations: Abdomen is soft.      Tenderness: There is no abdominal tenderness.   Musculoskeletal:      Comments: (Equally positive) L NOMI and FADIR, mildly TTP b/l SIJ, strongly (+) b/l lumbar facet loading  Also (+) b/l NOMI, thigh thrust, Gaenslens     Neurological:      Mental Status: She is alert and oriented to person, place, and time.      Sensory: No sensory deficit.      Deep Tendon Reflexes: Reflexes are normal and symmetric. Reflexes normal.      Comments: Normoreflexic, but 4/5 strength globally in LLE   Psychiatric:         Mood and Affect: Mood normal.         Behavior: Behavior normal.         Thought Content: Thought content normal.         Judgment: Judgment normal.

## 2024-08-20 ENCOUNTER — HOSPITAL ENCOUNTER (OUTPATIENT)
Dept: PAIN MEDICINE | Facility: HOSPITAL | Age: 69
Discharge: HOME | End: 2024-08-20
Payer: MEDICARE

## 2024-08-20 VITALS
OXYGEN SATURATION: 97 % | SYSTOLIC BLOOD PRESSURE: 119 MMHG | RESPIRATION RATE: 16 BRPM | DIASTOLIC BLOOD PRESSURE: 71 MMHG | WEIGHT: 165 LBS | HEART RATE: 73 BPM | TEMPERATURE: 97 F | HEIGHT: 67 IN | BODY MASS INDEX: 25.9 KG/M2

## 2024-08-20 DIAGNOSIS — M46.1 SACROILIITIS: Primary | ICD-10-CM

## 2024-08-20 DIAGNOSIS — R52 PAIN: ICD-10-CM

## 2024-08-20 PROCEDURE — 25010000002 METHYLPREDNISOLONE PER 40 MG: Performed by: PHYSICAL MEDICINE & REHABILITATION

## 2024-08-20 PROCEDURE — 77003 FLUOROGUIDE FOR SPINE INJECT: CPT

## 2024-08-20 PROCEDURE — 27096 INJECT SACROILIAC JOINT: CPT | Performed by: PHYSICAL MEDICINE & REHABILITATION

## 2024-08-20 PROCEDURE — 25510000001 IOPAMIDOL 41 % SOLUTION: Performed by: PHYSICAL MEDICINE & REHABILITATION

## 2024-08-20 PROCEDURE — 25010000002 BUPIVACAINE (PF) 0.25 % SOLUTION: Performed by: PHYSICAL MEDICINE & REHABILITATION

## 2024-08-20 RX ORDER — METHYLPREDNISOLONE ACETATE 40 MG/ML
40 INJECTION, SUSPENSION INTRA-ARTICULAR; INTRALESIONAL; INTRAMUSCULAR; SOFT TISSUE ONCE
Status: COMPLETED | OUTPATIENT
Start: 2024-08-20 | End: 2024-08-20

## 2024-08-20 RX ORDER — BUPIVACAINE HYDROCHLORIDE 2.5 MG/ML
5 INJECTION, SOLUTION EPIDURAL; INFILTRATION; INTRACAUDAL ONCE
Status: COMPLETED | OUTPATIENT
Start: 2024-08-20 | End: 2024-08-20

## 2024-08-20 RX ORDER — IOPAMIDOL 408 MG/ML
10 INJECTION, SOLUTION INTRATHECAL
Status: COMPLETED | OUTPATIENT
Start: 2024-08-20 | End: 2024-08-20

## 2024-08-20 RX ADMIN — METHYLPREDNISOLONE ACETATE 40 MG: 40 INJECTION, SUSPENSION INTRA-ARTICULAR; INTRALESIONAL; INTRAMUSCULAR; INTRASYNOVIAL; SOFT TISSUE at 10:19

## 2024-08-20 RX ADMIN — METHYLPREDNISOLONE ACETATE 40 MG: 40 INJECTION, SUSPENSION INTRA-ARTICULAR; INTRALESIONAL; INTRAMUSCULAR; INTRASYNOVIAL; SOFT TISSUE at 10:21

## 2024-08-20 RX ADMIN — IOPAMIDOL 10 ML: 408 INJECTION, SOLUTION INTRATHECAL at 10:19

## 2024-08-20 RX ADMIN — BUPIVACAINE HYDROCHLORIDE 5 ML: 2.5 INJECTION, SOLUTION EPIDURAL; INFILTRATION; INTRACAUDAL; PERINEURAL at 10:19

## 2024-08-20 NOTE — PROCEDURES
Procedures    Here for b/l SIJ injections. Denies allergy to iodine or contrast.      Sacroiliac Joint Injection    PREOPERATIVE DIAGNOSIS: Sacroilitis    POSTOPERATIVE DIAGNOSIS: Sacroilitis    PROCEDURE PERFORMED:  BILATERAL SACROILIAC JOINT INJECTION    The patient understands the risks and benefits of the procedure and wishes to proceed. The patient was seen in the preoperative area. Patient's consent was obtained and updated. Vitals were taken. Patient was then brought to the procedure suite and placed in prone position for a sacroiliac joint injection. The appropriate anatomic area was widely prepped with Chloraprep and draped in a sterile fashion. Under fluoroscopic guidance using a contralateral oblique view, a 25 gauge curved tip spinal needle was passed through skin anesthetized with 1% Lidocaine without epinephrine. The needle tip was guided to the lower pole of the joint using fluoroscopy. 1mL of preservative free contrast was injected into the joint to confirm location. A clear outline was obtained and 3 mL of steroid solution containing 2 mL 0.25% bupivacaine, and 1mL 40mg Depomedrol was injected in total.  The procedure was repeated in all respects on the opposite side. The patient tolerated with no piper-procedural complications.  A sterile dressing was placed over the puncture sites.

## 2024-08-21 ENCOUNTER — TELEPHONE (OUTPATIENT)
Dept: PAIN MEDICINE | Facility: HOSPITAL | Age: 69
End: 2024-08-21
Payer: MEDICARE

## 2024-08-21 NOTE — TELEPHONE ENCOUNTER
Post procedure phone call completed.  Pt states they are doing good and denies questions or concerns.  Patient reports pain level a #2.

## 2024-11-15 ENCOUNTER — OFFICE VISIT (OUTPATIENT)
Dept: PAIN MEDICINE | Facility: CLINIC | Age: 69
End: 2024-11-15
Payer: MEDICARE

## 2024-11-15 VITALS
HEART RATE: 84 BPM | DIASTOLIC BLOOD PRESSURE: 87 MMHG | WEIGHT: 170 LBS | BODY MASS INDEX: 26.63 KG/M2 | RESPIRATION RATE: 16 BRPM | OXYGEN SATURATION: 95 % | SYSTOLIC BLOOD PRESSURE: 169 MMHG

## 2024-11-15 DIAGNOSIS — G89.29 CHRONIC BILATERAL LOW BACK PAIN WITH SCIATICA, SCIATICA LATERALITY UNSPECIFIED: Primary | ICD-10-CM

## 2024-11-15 DIAGNOSIS — M47.817 OSTEOARTHRITIS OF LUMBOSACRAL SPINE WITHOUT MYELOPATHY: ICD-10-CM

## 2024-11-15 DIAGNOSIS — M70.61 GREATER TROCHANTERIC BURSITIS, RIGHT: ICD-10-CM

## 2024-11-15 DIAGNOSIS — M54.40 CHRONIC BILATERAL LOW BACK PAIN WITH SCIATICA, SCIATICA LATERALITY UNSPECIFIED: Primary | ICD-10-CM

## 2024-11-15 DIAGNOSIS — M54.17 LUMBOSACRAL RADICULOPATHY: ICD-10-CM

## 2024-11-15 DIAGNOSIS — M46.1 SACROILIITIS: ICD-10-CM

## 2024-11-15 DIAGNOSIS — M54.16 LUMBAR RADICULITIS: ICD-10-CM

## 2024-11-15 DIAGNOSIS — M50.10 CERVICAL DISC DISORDER WITH RADICULOPATHY: ICD-10-CM

## 2024-11-15 DIAGNOSIS — M25.552 HIP PAIN, LEFT: ICD-10-CM

## 2024-11-15 DIAGNOSIS — Z79.899 OTHER LONG TERM (CURRENT) DRUG THERAPY: ICD-10-CM

## 2024-11-15 DIAGNOSIS — Z79.899 HIGH RISK MEDICATION USE: Primary | ICD-10-CM

## 2024-11-15 DIAGNOSIS — M54.2 NECK PAIN: ICD-10-CM

## 2024-11-15 DIAGNOSIS — M51.362 DEGENERATION OF INTERVERTEBRAL DISC OF LUMBAR REGION WITH DISCOGENIC BACK PAIN AND LOWER EXTREMITY PAIN: ICD-10-CM

## 2024-11-15 DIAGNOSIS — M48.062 SPINAL STENOSIS, LUMBAR REGION, WITH NEUROGENIC CLAUDICATION: ICD-10-CM

## 2024-11-15 RX ORDER — GABAPENTIN 800 MG/1
800 TABLET ORAL 4 TIMES DAILY
Qty: 120 TABLET | Refills: 5 | Status: SHIPPED | OUTPATIENT
Start: 2024-11-15

## 2024-11-15 RX ORDER — OXYCODONE HYDROCHLORIDE 15 MG/1
15 TABLET ORAL EVERY 6 HOURS PRN
Qty: 120 TABLET | Refills: 0 | Status: SHIPPED | OUTPATIENT
Start: 2024-11-15

## 2024-11-15 NOTE — PROGRESS NOTES
Subjective   Tahira Ramirez is a 69 y.o. female.     History of Present Illness  Back/neck pain. Began years ago, 10/10 at worst, 5/10 at best, always present, varies, radiates to BUE and b/l hips, aching burning, stabbing, worse with all movement, interferes with ADLs, work, failed chiropractor, massage, course of PT in July 2017 and March 2018. Saw Dr. Meeks, as above, referred for cervical ESIs, also nonsurgical. MRI C-spine with multilevel DDD, DJD. Seen by Dr. Thompson in 2016. Denies anticoagulation, allergy to iodine or contrast, current infection or ABX. Had cervical TYRA with resolution of pain and numbness into BUE, then LESI x 2 with improvement. Rereferred for b/l L1-3 MBB and RFA by Dr. Meeks, had left then right RFA with tremendous relief. New severe muscle spasms in back, failed Baclofen 10mg BID, now 10mg TID. New pain in lower LBP improves with lumbar flexion, told her L TIFFANI is intact. New numbness in LLE in different areas including her genitals, no perianal numbness, no b/b incontinence, emergent new MRI L-spine w/o signs of cauda equina syndrome, unchanged from June 2021. Failed Norco 10mg and Gabapentin 600mg TID with PCP, cannot tolerate Gabapentin 800mg TID, referred for pain management. Being set up with [facility] and spine surgeon by PCP. HTN due to phentermine, being closely monitored by patient and PCP. Referred out by Dr. Camacho to Dr. Pritchard, may be having surgery but will likely be confined to a  after. Taking Tylenol to help control pain, highly elevated LFTs with PCP and CKD with nephrology.  Back Pain  Pertinent negatives include no abdominal pain, bladder incontinence, chest pain, fever, numbness or weakness.        The following portions of the patient's history were reviewed and updated as appropriate: allergies, current medications, past family history, past medical history, past social history, past surgical history and problem list.    Review of Systems   Constitutional:  Negative  for chills, fatigue and fever.   HENT:  Negative for hearing loss and trouble swallowing.    Eyes:  Negative for visual disturbance.   Respiratory:  Negative for shortness of breath.    Cardiovascular:  Negative for chest pain.   Gastrointestinal:  Negative for abdominal pain, constipation, diarrhea, nausea and vomiting.   Genitourinary:  Negative for urinary incontinence.   Musculoskeletal:  Positive for back pain and myalgias. Negative for arthralgias, joint swelling and neck pain.   Neurological:  Negative for dizziness, weakness, numbness and headache.       Objective   Physical Exam   Constitutional: She is oriented to person, place, and time. She appears well-developed and well-nourished.   HENT:   Head: Normocephalic and atraumatic.   Eyes: Pupils are equal, round, and reactive to light.   Cardiovascular: Normal rate, regular rhythm and normal heart sounds.   Pulmonary/Chest: Effort normal and breath sounds normal.   Abdominal: Soft. Normal appearance and bowel sounds are normal. She exhibits no distension. There is no abdominal tenderness.   Musculoskeletal:      Comments: (Equally positive) L NOMI and FADIR, mildly TTP b/l SIJ, strongly (+) b/l lumbar facet loading  Also (+) b/l NOMI, thigh thrust, Gaenslens     Neurological: She is alert and oriented to person, place, and time. She has normal reflexes. She displays normal reflexes. No sensory deficit.   Normoreflexic, but 4/5 strength globally in LLE   Psychiatric: Her behavior is normal. Mood, judgment and thought content normal.         Assessment & Plan   Diagnoses and all orders for this visit:    1. Chronic bilateral low back pain with sciatica, sciatica laterality unspecified (Primary)    2. Lumbar radiculitis    3. Lumbosacral radiculopathy    4. Neck pain    5. Osteoarthritis of lumbosacral spine without myelopathy    6. Other long term (current) drug therapy    7. Sacroiliitis    8. Spinal stenosis, lumbar region, with neurogenic  claudication    9. Greater trochanteric bursitis, right    10. Hip pain, left    11. Degeneration of intervertebral disc of lumbar region with discogenic back pain and lower extremity pain    12. Cervical disc disorder with radiculopathy      Received MRI L-spine from [facility], appropriate for LESIs.  Had 1st cervical TYRA, then 2 ILESI.  Performed 2 b/l L1-3 MBB per Dr. Meeks, 100% relief with both.  Performed left then right L1-3 RFA. Tremendous relief, still helping.  Performed 2 b/l L5/S1 MBB for diagnostic purposes,   Performed left L5/S1 RFA, performed on right,   Performed b/l SIJ injection with > 50% relief for > 3 months, performed repeat..  Increased to Baclofen 20mg TID. Could no longer get Robaxin.  Began Lidoderm q12h prn.  Discussed repeat LESIs vs TFESIs, performed left L4 & L5 TFESI with 1 day relief only, will not repeat.  Began Medrol Dose Pack.  Reviewed MRI L-spine results with patient.  New MRI L-spine reviewed, essentially nonsurgical per surgeon as surgery would be too extensive.  Patient presented to ED for work up including new MRI L-spine, r/o cauda equina syndrome, no change from June 2021, no signs c/w cauda equina syndrome.    Discussed risks and benefits of opioid treatment for chonic pain with patient, including expectations related to prescription requests, alternative modalities to opioids for managing pain, her treatment plan, risks of dependency and addiction, and safe storage practices for prescribed opioids, as well as proper and improper disposal of all medications.  UDS in order 8/24/23.  Treatment plan will consist of continuing current medication as long as it remains effective and is necessary, while evaluating patient at each visit and determining if the medication can be lowered or discontinued, while also using nonopioid therapies to reduce reliance on opioids.  Began Percocet 10mg QID prn, failed Norco 10mg QID prn, but highly elevated LFTs and CKD, switched to  Oxycodone 15mg QID prn to eliminate APAP and reduce need for extra APAP. Filled 10/18/24.  Patient's symptoms are still adequately managed by current medication regimen, is doing well at this strength and dosage, therefore I will continue to prescribe unchanged as the most appropriate course of treatment.  Stopped Gabapentin, insurance would not cover Lyrica 150mg BID and did not help in past, also has CKD, restarted Gabapentin 800mg QID.  RTC in 3 months for f/u.    INSPECT REPORT     As part of the patient's treatment plan, I am prescribing controlled substances. The patient has been made aware of appropriate use of such medications, including potential risk of somnolence, limited ability to drive and/or work safely, and the potential for dependence or overdose. It has also bee made clear that these medications are for use by this patient only, without concomitant use of alcohol or other substances unless prescribed.      Patient has completed prescribing agreement detailing terms of continued prescribing of controlled substances, including monitoring INSPECT reports, urine drug screening, and pill counts if necessary. The patient is aware that inappropriate use will results in cessation of prescribing such medications.     INSPECT report has been reviewed and scanned into the patient's chart.     As the clinician, I personally reviewed the INSPECT while the patient was in the office today.     History and physical exam exhibit continued safe and appropriate use of controlled substances.      ADD: Percocet denied, needs to fail 1) non-drug treatment such as PT, massage, SCS for 6 weeks within past 2 years, and 2) two non-opioids meds within past year such as baclofen and gabapentin, which she has. Ordered PT, pt has not started, prefers to just use GoodRx until she begins Medicare.                     Physical Exam  Constitutional:       Appearance: Normal appearance. She is well-developed and well-nourished.    HENT:      Head: Normocephalic and atraumatic.   Eyes:      Pupils: Pupils are equal, round, and reactive to light.   Cardiovascular:      Rate and Rhythm: Normal rate and regular rhythm.      Heart sounds: Normal heart sounds.   Pulmonary:      Effort: Pulmonary effort is normal.      Breath sounds: Normal breath sounds.   Abdominal:      General: Bowel sounds are normal. There is no distension.      Palpations: Abdomen is soft.      Tenderness: There is no abdominal tenderness.   Musculoskeletal:      Comments: (Equally positive) L NOMI and FADIR, mildly TTP b/l SIJ, strongly (+) b/l lumbar facet loading  Also (+) b/l NOMI, thigh thrust, Gaenslens     Neurological:      Mental Status: She is alert and oriented to person, place, and time.      Sensory: No sensory deficit.      Deep Tendon Reflexes: Reflexes are normal and symmetric. Reflexes normal.      Comments: Normoreflexic, but 4/5 strength globally in LLE   Psychiatric:         Mood and Affect: Mood normal.         Behavior: Behavior normal.         Thought Content: Thought content normal.         Judgment: Judgment normal.

## 2024-11-18 ENCOUNTER — TELEPHONE (OUTPATIENT)
Dept: PAIN MEDICINE | Facility: CLINIC | Age: 69
End: 2024-11-18
Payer: MEDICARE

## 2024-11-18 ENCOUNTER — TELEPHONE (OUTPATIENT)
Dept: PAIN MEDICINE | Facility: CLINIC | Age: 69
End: 2024-11-18

## 2024-11-18 DIAGNOSIS — M54.40 CHRONIC BILATERAL LOW BACK PAIN WITH SCIATICA, SCIATICA LATERALITY UNSPECIFIED: ICD-10-CM

## 2024-11-18 DIAGNOSIS — G89.29 CHRONIC BILATERAL LOW BACK PAIN WITH SCIATICA, SCIATICA LATERALITY UNSPECIFIED: ICD-10-CM

## 2024-11-18 NOTE — TELEPHONE ENCOUNTER
.    Caller: Tahira Ramirez    Relationship: Self    Best call back number: [phone]    Requested Prescriptions: oxyCODONE (ROXICODONE) 15 MG immediate release tablet   Requested Prescriptions      No prescriptions requested or ordered in this encounter        Pharmacy where request should be sent:        [id number] [place] [address] [address] [place] [address] - [phone]  - [phone] FX     Last office visit with prescribing clinician: 11/15/2024   Last telemedicine visit with prescribing clinician: Visit date not found   Next office visit with prescribing clinician: 2/13/2025       Does the patient have less than a 3 day supply:  [x] Yes  [] No    Would you like a call back once the refill request has been completed: [x] Yes [] No    If the office needs to give you a call back, can they leave a voicemail: [x] Yes [] No    PT STATES THAT  PHARMACY STILL HAS NOT RECEIVED THE PRESCRIPTION REQUEST    PT IS COMPLETELY OUT

## 2024-11-18 NOTE — TELEPHONE ENCOUNTER
Caller: JACOB    Relationship: SELF    Best call back number: [phone]    What is the best time to reach you: ANY    Who are you requesting to speak with (clinical staff, provider,  specific staff member): CLINICAL    What was the call regarding: GABAPENTIN AND OXYCODONE RX'S WERE NOT FILLED ON 11/15/24- LOOKS LIKE TRANSMISSION FAILED TO SEND TO [facility] - SHE IT OUT OF HER PAIN MEDS- PLEASE CALL

## 2024-11-19 RX ORDER — OXYCODONE HYDROCHLORIDE 15 MG/1
15 TABLET ORAL EVERY 6 HOURS PRN
Qty: 120 TABLET | Refills: 0 | Status: SHIPPED | OUTPATIENT
Start: 2024-11-19

## 2024-11-19 NOTE — TELEPHONE ENCOUNTER
PLEASE LET PATIENT KNOW STATUS OF MEDICATION BEING SENT [facility] ON [facility] - [phone]

## 2024-12-19 DIAGNOSIS — M54.42 CHRONIC BILATERAL LOW BACK PAIN WITH SCIATICA, SCIATICA LATERALITY UNSPECIFIED: ICD-10-CM

## 2024-12-19 DIAGNOSIS — M54.41 CHRONIC BILATERAL LOW BACK PAIN WITH SCIATICA, SCIATICA LATERALITY UNSPECIFIED: ICD-10-CM

## 2024-12-19 DIAGNOSIS — M54.40 CHRONIC BILATERAL LOW BACK PAIN WITH SCIATICA, SCIATICA LATERALITY UNSPECIFIED: ICD-10-CM

## 2024-12-19 DIAGNOSIS — G89.29 CHRONIC BILATERAL LOW BACK PAIN WITH SCIATICA, SCIATICA LATERALITY UNSPECIFIED: ICD-10-CM

## 2024-12-19 RX ORDER — OXYCODONE HYDROCHLORIDE 15 MG/1
15 TABLET ORAL EVERY 6 HOURS PRN
Qty: 120 TABLET | Refills: 0 | Status: SHIPPED | OUTPATIENT
Start: 2024-12-19

## 2024-12-19 NOTE — TELEPHONE ENCOUNTER
Caller: Tahira Ramirez    Relationship: Self    Best call back number:     Requested Prescriptions:   Requested Prescriptions     Pending Prescriptions Disp Refills    oxyCODONE (ROXICODONE) 15 MG immediate release tablet 120 tablet 0     Sig: Take 1 tablet by mouth Every 6 (Six) Hours As Needed for Severe Pain.        Pharmacy where request should be sent: [id number] [facility] [address] [facility] - [phone]  - [phone] FX     Last office visit with prescribing clinician: 11/15/2024   Last telemedicine visit with prescribing clinician: Visit date not found   Next office visit with prescribing clinician: 2/13/2025     Additional details provided by patient: PT IS OUT OF MEDICATION    Does the patient have less than a 3 day supply:  [x] Yes  [] No    Would you like a call back once the refill request has been completed: [x] Yes [] No    If the office needs to give you a call back, can they leave a voicemail: [x] Yes [] No    Chantal Brown Rep   12/19/24 09:06 EST

## 2025-01-13 ENCOUNTER — TELEPHONE (OUTPATIENT)
Dept: PAIN MEDICINE | Facility: CLINIC | Age: 70
End: 2025-01-13
Payer: MEDICARE

## 2025-01-13 NOTE — TELEPHONE ENCOUNTER
Caller: Tahira Ramirez    Relationship to patient: Self      Best call back number: [phone]    Provider: DR BENNETT    Medication PA needed: OXYCODONE    Reason for call/Prior Auth: PT STATES HER INSURANCE HAS CHANGED RECENTLY AND PT WOULD LIKE TO MAKE SURE HER RX IS APPROVED AND WILL BE READY TO  WHEN IT NEEDS TO BE REFILLED NEXT. PLEASE CONTACT PT TO DISCUSS.

## 2025-02-11 DIAGNOSIS — M54.40 CHRONIC BILATERAL LOW BACK PAIN WITH SCIATICA, SCIATICA LATERALITY UNSPECIFIED: ICD-10-CM

## 2025-02-11 DIAGNOSIS — G89.29 CHRONIC BILATERAL LOW BACK PAIN WITH SCIATICA, SCIATICA LATERALITY UNSPECIFIED: ICD-10-CM

## 2025-02-12 RX ORDER — OXYCODONE HYDROCHLORIDE 15 MG/1
15 TABLET ORAL EVERY 6 HOURS PRN
Qty: 120 TABLET | Refills: 0 | Status: SHIPPED | OUTPATIENT
Start: 2025-02-12 | End: 2025-02-17 | Stop reason: SDUPTHER

## 2025-02-17 DIAGNOSIS — G89.29 CHRONIC BILATERAL LOW BACK PAIN WITH SCIATICA, SCIATICA LATERALITY UNSPECIFIED: ICD-10-CM

## 2025-02-17 DIAGNOSIS — M54.40 CHRONIC BILATERAL LOW BACK PAIN WITH SCIATICA, SCIATICA LATERALITY UNSPECIFIED: ICD-10-CM

## 2025-02-17 RX ORDER — OXYCODONE HYDROCHLORIDE 15 MG/1
TABLET ORAL
Qty: 120 TABLET | Refills: 0 | OUTPATIENT
Start: 2025-02-17

## 2025-02-17 RX ORDER — OXYCODONE HYDROCHLORIDE 15 MG/1
15 TABLET ORAL EVERY 6 HOURS PRN
Qty: 120 TABLET | Refills: 0 | Status: SHIPPED | OUTPATIENT
Start: 2025-02-17 | End: 2025-02-20 | Stop reason: SDUPTHER

## 2025-02-17 NOTE — TELEPHONE ENCOUNTER
Caller: Tahira Ramirez    Relationship: Self    Best call back number:     Requested Prescriptions:   Requested Prescriptions     Pending Prescriptions Disp Refills    oxyCODONE (ROXICODONE) 15 MG immediate release tablet [Pharmacy Med Name: oxyCODONE HCl Oral Tablet 15 MG] 120 tablet 0     Sig: TAKE 1 TABLET BY MOUTH EVERY 6 HOURS AS NEEDED FOR severe PAIN        Pharmacy where request should be sent: [id number] - [place] [id number] [facility] - [phone]  - [phone] FX     Last office visit with prescribing clinician: 11/15/2024   Last telemedicine visit with prescribing clinician: Visit date not found   Next office visit with prescribing clinician: 2/20/2025     Additional details provided by patient: OUT OF MEDICATION     Does the patient have less than a 3 day supply:  [x] Yes  [] No    Would you like a call back once the refill request has been completed: [] Yes [] No    If the office needs to give you a call back, can they leave a voicemail: [] Yes [] No    Chantal Yepez Rep   02/17/25 09:33 EST

## 2025-02-17 NOTE — TELEPHONE ENCOUNTER
Patient informed that rx should be at [facility] in [place]. She is going to  there is they have it in stock.

## 2025-02-20 ENCOUNTER — OFFICE VISIT (OUTPATIENT)
Dept: PAIN MEDICINE | Facility: CLINIC | Age: 70
End: 2025-02-20
Payer: MEDICARE

## 2025-02-20 VITALS
BODY MASS INDEX: 26.55 KG/M2 | DIASTOLIC BLOOD PRESSURE: 86 MMHG | RESPIRATION RATE: 16 BRPM | SYSTOLIC BLOOD PRESSURE: 149 MMHG | OXYGEN SATURATION: 96 % | HEART RATE: 87 BPM | WEIGHT: 169.5 LBS

## 2025-02-20 DIAGNOSIS — M54.16 LUMBAR RADICULITIS: ICD-10-CM

## 2025-02-20 DIAGNOSIS — M46.1 SACROILIITIS: ICD-10-CM

## 2025-02-20 DIAGNOSIS — M54.40 CHRONIC BILATERAL LOW BACK PAIN WITH SCIATICA, SCIATICA LATERALITY UNSPECIFIED: Primary | ICD-10-CM

## 2025-02-20 DIAGNOSIS — M48.062 SPINAL STENOSIS, LUMBAR REGION, WITH NEUROGENIC CLAUDICATION: ICD-10-CM

## 2025-02-20 DIAGNOSIS — G89.29 CHRONIC BILATERAL LOW BACK PAIN WITH SCIATICA, SCIATICA LATERALITY UNSPECIFIED: Primary | ICD-10-CM

## 2025-02-20 DIAGNOSIS — M50.10 CERVICAL DISC DISORDER WITH RADICULOPATHY: ICD-10-CM

## 2025-02-20 DIAGNOSIS — M54.17 LUMBOSACRAL RADICULOPATHY: ICD-10-CM

## 2025-02-20 DIAGNOSIS — Z79.899 OTHER LONG TERM (CURRENT) DRUG THERAPY: ICD-10-CM

## 2025-02-20 DIAGNOSIS — M70.61 GREATER TROCHANTERIC BURSITIS, RIGHT: ICD-10-CM

## 2025-02-20 DIAGNOSIS — M54.2 NECK PAIN: ICD-10-CM

## 2025-02-20 DIAGNOSIS — M25.552 HIP PAIN, LEFT: ICD-10-CM

## 2025-02-20 DIAGNOSIS — M47.817 OSTEOARTHRITIS OF LUMBOSACRAL SPINE WITHOUT MYELOPATHY: ICD-10-CM

## 2025-02-20 DIAGNOSIS — M51.362 DEGENERATION OF INTERVERTEBRAL DISC OF LUMBAR REGION WITH DISCOGENIC BACK PAIN AND LOWER EXTREMITY PAIN: ICD-10-CM

## 2025-02-20 RX ORDER — GABAPENTIN 800 MG/1
800 TABLET ORAL 4 TIMES DAILY
Qty: 120 TABLET | Refills: 5 | Status: SHIPPED | OUTPATIENT
Start: 2025-02-20

## 2025-02-20 RX ORDER — OXYCODONE HYDROCHLORIDE 15 MG/1
15 TABLET ORAL EVERY 6 HOURS PRN
Qty: 120 TABLET | Refills: 0 | Status: SHIPPED | OUTPATIENT
Start: 2025-02-20

## 2025-02-20 NOTE — PROGRESS NOTES
Subjective   Tahira Ramirez is a 69 y.o. female.     History of Present Illness  CC: low back pain    Back/neck pain. Began > 10 years ago, treated by me since 3/19/18, 10/10 at worst, 5/10 at best, always present, varies, radiates to BUE and b/l hips, aching burning, stabbing, worse with all movement, interferes with ADLs, work, failed chiropractor, massage, course of PT in July 2017 and March 2018. Saw Dr. Meeks, as above, referred for cervical ESIs, also nonsurgical. MRI C-spine with multilevel DDD, DJD. Seen by Dr. Thompson in 2016. Denies anticoagulation, allergy to iodine or contrast, current infection or ABX. Had cervical TYRA with resolution of pain and numbness into BUE, then LESI x 2 with improvement. Rereferred for b/l L1-3 MBB and RFA by Dr. Meeks, had left then right RFA with tremendous relief. New severe muscle spasms in back, failed Baclofen 10mg BID, now 10mg TID. New pain in lower LBP improves with lumbar flexion, told her L TIFFANI is intact. New numbness in LLE in different areas including her genitals, no perianal numbness, no b/b incontinence, emergent new MRI L-spine w/o signs of cauda equina syndrome, unchanged from June 2021. Failed Norco 10mg and Gabapentin 600mg TID with PCP, cannot tolerate Gabapentin 800mg TID, referred for pain management. Being set up with LSO and spine surgeon by PCP. HTN due to phentermine, being closely monitored by patient and PCP. Referred out by Dr. Camacho to Dr. Pritchard, may be having surgery but will likely be confined to a WC after. Taking Tylenol to help control pain, highly elevated LFTs with PCP and CKD with nephrology.  Back Pain  Pertinent negatives include no abdominal pain, bladder incontinence, chest pain, fever, numbness or weakness.        The following portions of the patient's history were reviewed and updated as appropriate: allergies, current medications, past family history, past medical history, past social history, past surgical history and problem  list.    Review of Systems   Constitutional:  Negative for chills, fatigue and fever.   HENT:  Negative for hearing loss and trouble swallowing.    Eyes:  Negative for visual disturbance.   Respiratory:  Negative for shortness of breath.    Cardiovascular:  Negative for chest pain.   Gastrointestinal:  Negative for abdominal pain, constipation, diarrhea, nausea and vomiting.   Genitourinary:  Negative for urinary incontinence.   Musculoskeletal:  Positive for back pain and myalgias. Negative for arthralgias, joint swelling and neck pain.   Neurological:  Negative for dizziness, weakness, numbness and headache.       Objective   Physical Exam   Constitutional: She is oriented to person, place, and time. She appears well-developed and well-nourished.   HENT:   Head: Normocephalic and atraumatic.   Eyes: Pupils are equal, round, and reactive to light.   Cardiovascular: Normal rate, regular rhythm and normal heart sounds.   Pulmonary/Chest: Effort normal and breath sounds normal.   Abdominal: Soft. Normal appearance and bowel sounds are normal. She exhibits no distension. There is no abdominal tenderness.   Musculoskeletal:      Comments: (Equally positive) L NMOI and FADIR, mildly TTP b/l SIJ, strongly (+) b/l lumbar facet loading  Also (+) b/l NOMI, thigh thrust, Gaenslens     Neurological: She is alert and oriented to person, place, and time. She has normal reflexes. She displays normal reflexes. No sensory deficit.   Normoreflexic, but 4/5 strength globally in LLE   Psychiatric: Her behavior is normal. Mood, judgment and thought content normal.         Assessment & Plan   Diagnoses and all orders for this visit:    1. Chronic bilateral low back pain with sciatica, sciatica laterality unspecified (Primary)    2. Neck pain    3. Lumbar radiculitis    4. Lumbosacral radiculopathy    5. Greater trochanteric bursitis, right    6. Hip pain, left    7. Degeneration of intervertebral disc of lumbar region with discogenic  back pain and lower extremity pain    8. Cervical disc disorder with radiculopathy    9. Osteoarthritis of lumbosacral spine without myelopathy    10. Other long term (current) drug therapy    11. Sacroiliitis    12. Spinal stenosis, lumbar region, with neurogenic claudication      Received MRI L-spine from [facility], appropriate for LESIs.  Had 1st cervical TYRA, then 2 ILESI.  Performed 2 b/l L1-3 MBB per Dr. Meeks, 100% relief with both.  Performed left then right L1-3 RFA. Tremendous relief, still helping.  Performed 2 b/l L5/S1 MBB for diagnostic purposes,   Performed left L5/S1 RFA, performed on right,   Performed b/l SIJ injection with > 50% relief for > 3 months, performed repeat..  Increased to Baclofen 20mg TID. Could no longer get Robaxin.  Began Lidoderm q12h prn.  Discussed repeat LESIs vs TFESIs, performed left L4 & L5 TFESI with 1 day relief only, will not repeat.  Began Medrol Dose Pack.  Reviewed MRI L-spine results with patient.  New MRI L-spine reviewed, essentially nonsurgical per surgeon as surgery would be too extensive.  Patient presented to ED for work up including new MRI L-spine, r/o cauda equina syndrome, no change from June 2021, no signs c/w cauda equina syndrome.    Discussed risks and benefits of opioid treatment for chonic pain with patient, including expectations related to prescription requests, alternative modalities to opioids for managing pain, her treatment plan, risks of dependency and addiction, and safe storage practices for prescribed opioids, as well as proper and improper disposal of all medications.  UDS in order 11/15/24.  Treatment plan will consist of continuing current medication as long as it remains effective and is necessary, while evaluating patient at each visit and determining if the medication can be lowered or discontinued, while also using nonopioid therapies to reduce reliance on opioids.  Began Percocet 10mg QID prn, failed Norco 10mg QID prn, but highly  elevated LFTs and CKD, switched to Oxycodone 15mg QID prn to eliminate APAP and reduce need for extra APAP. Filled 2/17/25.  Patient's symptoms are still adequately managed by current medication regimen, is doing well at this strength and dosage, therefore I will continue to prescribe unchanged as the most appropriate course of treatment.  Stopped Gabapentin, insurance would not cover Lyrica 150mg BID and did not help in past, also has CKD, restarted Gabapentin 800mg QID.  RTC in 3 months for f/u.    INSPECT REPORT     As part of the patient's treatment plan, I am prescribing controlled substances. The patient has been made aware of appropriate use of such medications, including potential risk of somnolence, limited ability to drive and/or work safely, and the potential for dependence or overdose. It has also bee made clear that these medications are for use by this patient only, without concomitant use of alcohol or other substances unless prescribed.      Patient has completed prescribing agreement detailing terms of continued prescribing of controlled substances, including monitoring INSPECT reports, urine drug screening, and pill counts if necessary. The patient is aware that inappropriate use will results in cessation of prescribing such medications.     INSPECT report has been reviewed and scanned into the patient's chart.     As the clinician, I personally reviewed the INSPECT while the patient was in the office today.     History and physical exam exhibit continued safe and appropriate use of controlled substances.      ADD: Percocet denied, needs to fail 1) non-drug treatment such as PT, massage, SCS for 6 weeks within past 2 years, and 2) two non-opioids meds within past year such as baclofen and gabapentin, which she has. Ordered PT, pt has not started, prefers to just use GoodRx until she begins Medicare.

## 2025-03-10 RX ORDER — BACLOFEN 20 MG/1
20 TABLET ORAL 3 TIMES DAILY
Qty: 90 TABLET | Refills: 0 | OUTPATIENT
Start: 2025-03-10

## 2025-03-11 ENCOUNTER — TELEPHONE (OUTPATIENT)
Dept: PAIN MEDICINE | Facility: CLINIC | Age: 70
End: 2025-03-11
Payer: MEDICARE

## 2025-03-11 RX ORDER — BACLOFEN 20 MG/1
20 TABLET ORAL 3 TIMES DAILY
Qty: 90 TABLET | Refills: 0 | Status: SHIPPED | OUTPATIENT
Start: 2025-03-11

## 2025-03-11 NOTE — TELEPHONE ENCOUNTER
Caller: Tahira Ramirez    Relationship: Self    Best call back number: [phone]    Requested Prescriptions:   BACLOFEN & STEROIDS    Pharmacy where request should be sent: [id number] - [place] [id number] - [phone]  - [phone]     Last office visit with prescribing clinician: 2/20/2025   Last telemedicine visit with prescribing clinician: Visit date not found   Next office visit with prescribing clinician: 5/2/2025     Does the patient have less than a 3 day supply:  [x] Yes  [] No    Would you like a call back once the refill request has been completed: [] Yes [] No    If the office needs to give you a call back, can they leave a voicemail: [] Yes [] No    Ankur Koroma   03/11/25 09:28 EDT

## 2025-04-07 RX ORDER — BACLOFEN 20 MG/1
20 TABLET ORAL 3 TIMES DAILY
Qty: 90 TABLET | Refills: 0 | Status: SHIPPED | OUTPATIENT
Start: 2025-04-07

## 2025-05-02 ENCOUNTER — TELEPHONE (OUTPATIENT)
Dept: PAIN MEDICINE | Facility: CLINIC | Age: 70
End: 2025-05-02

## 2025-05-02 ENCOUNTER — OFFICE VISIT (OUTPATIENT)
Dept: PAIN MEDICINE | Facility: CLINIC | Age: 70
End: 2025-05-02
Payer: MEDICARE

## 2025-05-02 VITALS
RESPIRATION RATE: 16 BRPM | BODY MASS INDEX: 26 KG/M2 | OXYGEN SATURATION: 97 % | WEIGHT: 166 LBS | DIASTOLIC BLOOD PRESSURE: 95 MMHG | HEART RATE: 92 BPM | SYSTOLIC BLOOD PRESSURE: 170 MMHG

## 2025-05-02 DIAGNOSIS — M54.17 LUMBOSACRAL RADICULOPATHY: ICD-10-CM

## 2025-05-02 DIAGNOSIS — M48.062 SPINAL STENOSIS, LUMBAR REGION, WITH NEUROGENIC CLAUDICATION: ICD-10-CM

## 2025-05-02 DIAGNOSIS — M51.362 DEGENERATION OF INTERVERTEBRAL DISC OF LUMBAR REGION WITH DISCOGENIC BACK PAIN AND LOWER EXTREMITY PAIN: ICD-10-CM

## 2025-05-02 DIAGNOSIS — M54.16 LUMBAR RADICULITIS: ICD-10-CM

## 2025-05-02 DIAGNOSIS — M54.41 CHRONIC BILATERAL LOW BACK PAIN WITH SCIATICA, SCIATICA LATERALITY UNSPECIFIED: Primary | ICD-10-CM

## 2025-05-02 DIAGNOSIS — M54.2 NECK PAIN: ICD-10-CM

## 2025-05-02 DIAGNOSIS — G89.29 CHRONIC BILATERAL LOW BACK PAIN WITH SCIATICA, SCIATICA LATERALITY UNSPECIFIED: Primary | ICD-10-CM

## 2025-05-02 DIAGNOSIS — M50.10 CERVICAL DISC DISORDER WITH RADICULOPATHY: ICD-10-CM

## 2025-05-02 DIAGNOSIS — M47.817 OSTEOARTHRITIS OF LUMBOSACRAL SPINE WITHOUT MYELOPATHY: ICD-10-CM

## 2025-05-02 DIAGNOSIS — Z79.899 OTHER LONG TERM (CURRENT) DRUG THERAPY: ICD-10-CM

## 2025-05-02 DIAGNOSIS — M54.42 CHRONIC BILATERAL LOW BACK PAIN WITH SCIATICA, SCIATICA LATERALITY UNSPECIFIED: Primary | ICD-10-CM

## 2025-05-02 DIAGNOSIS — M25.552 HIP PAIN, LEFT: ICD-10-CM

## 2025-05-02 DIAGNOSIS — M70.61 GREATER TROCHANTERIC BURSITIS, RIGHT: ICD-10-CM

## 2025-05-02 DIAGNOSIS — M46.1 SACROILIITIS: ICD-10-CM

## 2025-05-02 RX ORDER — OXYCODONE HYDROCHLORIDE 15 MG/1
15 TABLET ORAL EVERY 6 HOURS PRN
Qty: 120 TABLET | Refills: 0 | Status: SHIPPED | OUTPATIENT
Start: 2025-05-02

## 2025-05-02 RX ORDER — OXYCODONE HYDROCHLORIDE 15 MG/1
TABLET ORAL
Qty: 120 TABLET | Refills: 0 | OUTPATIENT
Start: 2025-05-02

## 2025-05-02 RX ORDER — GABAPENTIN 800 MG/1
800 TABLET ORAL 4 TIMES DAILY
Qty: 120 TABLET | Refills: 5 | Status: SHIPPED | OUTPATIENT
Start: 2025-05-02

## 2025-05-02 RX ORDER — METHYLPREDNISOLONE 4 MG/1
TABLET ORAL
Qty: 21 TABLET | Refills: 0 | Status: SHIPPED | OUTPATIENT
Start: 2025-05-02

## 2025-05-02 NOTE — TELEPHONE ENCOUNTER
Caller: WESTON    Relationship: Other    Best call back number:[phone]    What form or medical record are you requesting: NEED OFFICE NOTE AND DEMOGRAPHIC INFO FOR PATIENT ALONG WITH INSURANCE INFO     Who is requesting this form or medical record from you: [facility]     How would you like to receive the form or medical records (pick-up, mail, fax): FAX  If fax, what is the fax number: [phone]    Timeframe paperwork needed: ASAP

## 2025-05-02 NOTE — PROGRESS NOTES
Subjective   Tahira Ramirez is a 69 y.o. female.     History of Present Illness  CC: low back pain    Back/neck pain. Began > 10 years ago, treated by me since 3/19/18, 10/10 at worst, 5/10 at best, always present, varies, radiates to BUE and b/l hips, aching burning, stabbing, worse with all movement, interferes with ADLs, work, failed chiropractor, massage, course of PT in July 2017 and March 2018. Saw Dr. Meeks, as above, referred for cervical ESIs, also nonsurgical. MRI C-spine with multilevel DDD, DJD. Seen by Dr. Thompson in 2016. Denies anticoagulation, allergy to iodine or contrast, current infection or ABX. Had cervical TYRA with resolution of pain and numbness into BUE, then LESI x 2 with improvement. Rereferred for b/l L1-3 MBB and RFA by Dr. Meeks, had left then right RFA with tremendous relief. Severe muscle spasms in back, failed Baclofen 10mg BID, then 10mg TID. New pain in lower LBP improves with lumbar flexion, told her L TIFFANI is intact. New numbness in LLE in different areas including her genitals, no perianal numbness, no b/b incontinence, emergent MRI L-spine w/o signs of cauda equina syndrome, unchanged from June 2021. Failed Norco 10mg and Gabapentin 600mg TID with PCP, cannot tolerate Gabapentin 800mg TID, referred for pain management, began Percocet, now Oxycodone 15mg QID prn, failed lower doses. Referred out by Dr. Camacho to Dr. Pritchard, discussed surgery but will likely be confined to a  after.   Back Pain  Associated symptoms: no abdominal pain, no bladder incontinence, no chest pain, no fever, no numbness and no weakness         The following portions of the patient's history were reviewed and updated as appropriate: allergies, current medications, past family history, past medical history, past social history, past surgical history and problem list.    Review of Systems   Constitutional:  Negative for chills, fatigue and fever.   HENT:  Negative for hearing loss and trouble swallowing.     Eyes:  Negative for visual disturbance.   Respiratory:  Negative for shortness of breath.    Cardiovascular:  Negative for chest pain.   Gastrointestinal:  Negative for abdominal pain, constipation, diarrhea, nausea and vomiting.   Genitourinary:  Negative for urinary incontinence.   Musculoskeletal:  Positive for back pain and myalgias. Negative for arthralgias, joint swelling and neck pain.   Neurological:  Negative for dizziness, weakness, numbness and headache.       Objective   Physical Exam   Constitutional: She is oriented to person, place, and time. She appears well-developed and well-nourished.   HENT:   Head: Normocephalic and atraumatic.   Eyes: Pupils are equal, round, and reactive to light.   Cardiovascular: Normal rate, regular rhythm and normal heart sounds.   Pulmonary/Chest: Effort normal and breath sounds normal.   Abdominal: Soft. Normal appearance and bowel sounds are normal. She exhibits no distension. There is no abdominal tenderness.   Musculoskeletal:      Comments: (Equally positive) L NOMI and FADIR, mildly TTP b/l SIJ, strongly (+) b/l lumbar facet loading  Also (+) b/l NOMI, thigh thrust, Gaenslens     Neurological: She is alert and oriented to person, place, and time. She has normal reflexes. She displays normal reflexes. No sensory deficit.   Normoreflexic, but 4/5 strength globally in LLE   Psychiatric: Her behavior is normal. Mood, judgment and thought content normal.         Assessment & Plan   Diagnoses and all orders for this visit:    1. Chronic bilateral low back pain with sciatica, sciatica laterality unspecified (Primary)    2. Neck pain    3. Cervical disc disorder with radiculopathy    4. Degeneration of intervertebral disc of lumbar region with discogenic back pain and lower extremity pain    5. Greater trochanteric bursitis, right    6. Hip pain, left    7. Lumbar radiculitis    8. Lumbosacral radiculopathy    9. Osteoarthritis of lumbosacral spine without  myelopathy    10. Other long term (current) drug therapy    11. Sacroiliitis    12. Spinal stenosis, lumbar region, with neurogenic claudication      Received MRI L-spine from [facility], appropriate for LESIs.  Had 1st cervical TYRA, then 2 ILESI.  Performed 2 b/l L1-3 MBB per Dr. Meeks, 100% relief with both.  Performed left then right L1-3 RFA. Tremendous relief, still helping.  Performed 2 b/l L5/S1 MBB for diagnostic purposes,   Performed left L5/S1 RFA, performed on right,   Performed b/l SIJ injection with > 50% relief for > 3 months, performed repeat..  Increased to Baclofen 20mg TID. Could no longer get Robaxin.  Began Lidoderm q12h prn.  Discussed repeat LESIs vs TFESIs, performed left L4 & L5 TFESI with 1 day relief only, will not repeat.  Began Medrol Dose Pack. Reorder.  Reviewed MRI L-spine results with patient.  Order LSO. I am ordering a custom fitted back brace due to a waist-to-hip ration disparity that requires more than minimal self-adjustment to provide an individualized fit. The brace is needed to reduce pain by restricting mobility of the trunk.  New MRI L-spine reviewed, essentially nonsurgical per surgeon as surgery would be too extensive.  Patient presented to ED for work up including new MRI L-spine, r/o cauda equina syndrome, no change from June 2021, no signs c/w cauda equina syndrome.    Discussed risks and benefits of opioid treatment for chonic pain with patient, including expectations related to prescription requests, alternative modalities to opioids for managing pain, her treatment plan, risks of dependency and addiction, and safe storage practices for prescribed opioids, as well as proper and improper disposal of all medications.  UDS in order 11/15/24.  Treatment plan will consist of continuing current medication as long as it remains effective and is necessary, while evaluating patient at each visit and determining if the medication can be lowered or discontinued, while also  using nonopioid therapies to reduce reliance on opioids.  Began Percocet 10mg QID prn, failed Norco 10mg QID prn, but highly elevated LFTs and CKD, switched to Oxycodone 15mg QID prn to eliminate APAP and reduce need for extra APAP. Filled 4/18/25.  Patient's symptoms are still adequately managed by current medication regimen, is doing well at this strength and dosage, therefore I will continue to prescribe unchanged as the most appropriate course of treatment.  Stopped Gabapentin, insurance would not cover Lyrica 150mg BID and did not help in past, also has CKD, restarted Gabapentin 800mg QID.  RTC in 3 months for f/u.    INSPECT REPORT     As part of the patient's treatment plan, I am prescribing controlled substances. The patient has been made aware of appropriate use of such medications, including potential risk of somnolence, limited ability to drive and/or work safely, and the potential for dependence or overdose. It has also bee made clear that these medications are for use by this patient only, without concomitant use of alcohol or other substances unless prescribed.      Patient has completed prescribing agreement detailing terms of continued prescribing of controlled substances, including monitoring INSPECT reports, urine drug screening, and pill counts if necessary. The patient is aware that inappropriate use will results in cessation of prescribing such medications.     INSPECT report has been reviewed and scanned into the patient's chart.     As the clinician, I personally reviewed the INSPECT while the patient was in the office today.     History and physical exam exhibit continued safe and appropriate use of controlled substances.      ADD: Percocet denied, needs to fail 1) non-drug treatment such as PT, massage, SCS for 6 weeks within past 2 years, and 2) two non-opioids meds within past year such as baclofen and gabapentin, which she has. Ordered PT, pt has not started, prefers to just use GoodRx  until she begins Medicare.

## 2025-05-07 RX ORDER — BACLOFEN 20 MG/1
20 TABLET ORAL 3 TIMES DAILY
Qty: 90 TABLET | Refills: 1 | Status: SHIPPED | OUTPATIENT
Start: 2025-05-07

## 2025-07-15 RX ORDER — BACLOFEN 20 MG/1
20 TABLET ORAL 3 TIMES DAILY
Qty: 90 TABLET | Refills: 0 | Status: SHIPPED | OUTPATIENT
Start: 2025-07-15

## 2025-08-08 ENCOUNTER — OFFICE VISIT (OUTPATIENT)
Dept: PAIN MEDICINE | Facility: CLINIC | Age: 70
End: 2025-08-08
Payer: MEDICARE

## 2025-08-08 VITALS
WEIGHT: 164.7 LBS | BODY MASS INDEX: 25.8 KG/M2 | SYSTOLIC BLOOD PRESSURE: 156 MMHG | OXYGEN SATURATION: 96 % | RESPIRATION RATE: 16 BRPM | DIASTOLIC BLOOD PRESSURE: 101 MMHG | HEART RATE: 90 BPM

## 2025-08-08 DIAGNOSIS — M48.062 SPINAL STENOSIS, LUMBAR REGION, WITH NEUROGENIC CLAUDICATION: ICD-10-CM

## 2025-08-08 DIAGNOSIS — M47.817 OSTEOARTHRITIS OF LUMBOSACRAL SPINE WITHOUT MYELOPATHY: ICD-10-CM

## 2025-08-08 DIAGNOSIS — M51.362 DEGENERATION OF INTERVERTEBRAL DISC OF LUMBAR REGION WITH DISCOGENIC BACK PAIN AND LOWER EXTREMITY PAIN: ICD-10-CM

## 2025-08-08 DIAGNOSIS — M25.552 HIP PAIN, LEFT: ICD-10-CM

## 2025-08-08 DIAGNOSIS — M50.10 CERVICAL DISC DISORDER WITH RADICULOPATHY: ICD-10-CM

## 2025-08-08 DIAGNOSIS — M54.16 LUMBAR RADICULITIS: ICD-10-CM

## 2025-08-08 DIAGNOSIS — M54.17 LUMBOSACRAL RADICULOPATHY: ICD-10-CM

## 2025-08-08 DIAGNOSIS — M46.1 SACROILIITIS: ICD-10-CM

## 2025-08-08 DIAGNOSIS — Z79.899 OTHER LONG TERM (CURRENT) DRUG THERAPY: ICD-10-CM

## 2025-08-08 DIAGNOSIS — M54.2 NECK PAIN: ICD-10-CM

## 2025-08-08 DIAGNOSIS — G89.29 CHRONIC BILATERAL LOW BACK PAIN WITH SCIATICA, SCIATICA LATERALITY UNSPECIFIED: Primary | ICD-10-CM

## 2025-08-08 DIAGNOSIS — M70.61 GREATER TROCHANTERIC BURSITIS, RIGHT: ICD-10-CM

## 2025-08-08 DIAGNOSIS — M54.41 CHRONIC BILATERAL LOW BACK PAIN WITH SCIATICA, SCIATICA LATERALITY UNSPECIFIED: Primary | ICD-10-CM

## 2025-08-08 DIAGNOSIS — M54.42 CHRONIC BILATERAL LOW BACK PAIN WITH SCIATICA, SCIATICA LATERALITY UNSPECIFIED: Primary | ICD-10-CM

## 2025-08-08 RX ORDER — OXYCODONE HYDROCHLORIDE 15 MG/1
15 TABLET ORAL EVERY 6 HOURS PRN
Qty: 120 TABLET | Refills: 0 | Status: SHIPPED | OUTPATIENT
Start: 2025-08-08

## 2025-08-08 RX ORDER — GABAPENTIN 800 MG/1
800 TABLET ORAL 4 TIMES DAILY
Qty: 120 TABLET | Refills: 5 | Status: SHIPPED | OUTPATIENT
Start: 2025-08-08

## 2025-08-15 RX ORDER — BACLOFEN 20 MG/1
20 TABLET ORAL 3 TIMES DAILY
Qty: 90 TABLET | Refills: 0 | Status: SHIPPED | OUTPATIENT
Start: 2025-08-15